# Patient Record
Sex: FEMALE | Race: WHITE | NOT HISPANIC OR LATINO | Employment: UNEMPLOYED | URBAN - NONMETROPOLITAN AREA
[De-identification: names, ages, dates, MRNs, and addresses within clinical notes are randomized per-mention and may not be internally consistent; named-entity substitution may affect disease eponyms.]

---

## 2019-02-27 ENCOUNTER — OUTSIDE FACILITY SERVICE (OUTPATIENT)
Dept: CARDIOLOGY | Facility: CLINIC | Age: 49
End: 2019-02-27

## 2019-02-27 PROCEDURE — 93018 CV STRESS TEST I&R ONLY: CPT | Performed by: INTERNAL MEDICINE

## 2021-06-10 ENCOUNTER — OFFICE VISIT (OUTPATIENT)
Dept: OBSTETRICS AND GYNECOLOGY | Facility: CLINIC | Age: 51
End: 2021-06-10

## 2021-06-10 VITALS
DIASTOLIC BLOOD PRESSURE: 76 MMHG | WEIGHT: 247 LBS | BODY MASS INDEX: 43.77 KG/M2 | SYSTOLIC BLOOD PRESSURE: 120 MMHG | HEIGHT: 63 IN

## 2021-06-10 DIAGNOSIS — R35.0 URINARY FREQUENCY: Primary | ICD-10-CM

## 2021-06-10 LAB
BILIRUB UR QL STRIP: NEGATIVE
CLARITY UR: ABNORMAL
COLOR UR: YELLOW
GLUCOSE UR STRIP-MCNC: NEGATIVE MG/DL
HGB UR QL STRIP.AUTO: NEGATIVE
KETONES UR QL STRIP: NEGATIVE
LEUKOCYTE ESTERASE UR QL STRIP.AUTO: NEGATIVE
NITRITE UR QL STRIP: NEGATIVE
PH UR STRIP.AUTO: 6.5 [PH] (ref 5–8)
PROT UR QL STRIP: NEGATIVE
SP GR UR STRIP: 1.01 (ref 1–1.03)
UROBILINOGEN UR QL STRIP: ABNORMAL

## 2021-06-10 PROCEDURE — 81003 URINALYSIS AUTO W/O SCOPE: CPT | Performed by: OBSTETRICS & GYNECOLOGY

## 2021-06-10 PROCEDURE — 87086 URINE CULTURE/COLONY COUNT: CPT | Performed by: OBSTETRICS & GYNECOLOGY

## 2021-06-10 RX ORDER — TIOTROPIUM BROMIDE INHALATION SPRAY 1.56 UG/1
SPRAY, METERED RESPIRATORY (INHALATION)
COMMUNITY
Start: 2021-06-04 | End: 2023-01-05 | Stop reason: SDUPTHER

## 2021-06-10 RX ORDER — ASPIRIN 81 MG/1
TABLET, CHEWABLE ORAL
COMMUNITY
End: 2022-12-09

## 2021-06-10 RX ORDER — BLOOD SUGAR DIAGNOSTIC
STRIP MISCELLANEOUS
COMMUNITY
Start: 2021-05-22 | End: 2023-01-05 | Stop reason: SDUPTHER

## 2021-06-10 RX ORDER — BUDESONIDE AND FORMOTEROL FUMARATE DIHYDRATE 80; 4.5 UG/1; UG/1
AEROSOL RESPIRATORY (INHALATION) EVERY 6 HOURS
COMMUNITY

## 2021-06-10 RX ORDER — BLOOD-GLUCOSE METER
EACH MISCELLANEOUS
COMMUNITY
Start: 2021-02-25

## 2021-06-10 RX ORDER — FAMOTIDINE 20 MG/1
TABLET, FILM COATED ORAL
COMMUNITY
Start: 2021-01-26 | End: 2023-01-05 | Stop reason: SDUPTHER

## 2021-06-10 RX ORDER — CHLORTHALIDONE 25 MG/1
TABLET ORAL
COMMUNITY
Start: 2021-01-26 | End: 2023-01-05 | Stop reason: SDUPTHER

## 2021-06-10 RX ORDER — LANCETS 33 GAUGE
EACH MISCELLANEOUS
COMMUNITY
Start: 2021-02-25 | End: 2023-01-05 | Stop reason: SDUPTHER

## 2021-06-10 RX ORDER — ATORVASTATIN CALCIUM 40 MG/1
TABLET, FILM COATED ORAL
COMMUNITY
Start: 2021-05-07 | End: 2023-01-05 | Stop reason: SDUPTHER

## 2021-06-11 PROBLEM — N91.2 AMENORRHEA: Status: ACTIVE | Noted: 2021-06-11

## 2021-06-11 LAB — BACTERIA SPEC AEROBE CULT: NORMAL

## 2021-06-11 NOTE — PROGRESS NOTES
CC:  Amenorrhea  Annual  Unable to have encounter today due to physician conflict  Order placed  Reschedule patient

## 2021-08-12 ENCOUNTER — LAB (OUTPATIENT)
Dept: LAB | Facility: HOSPITAL | Age: 51
End: 2021-08-12

## 2021-08-12 ENCOUNTER — OFFICE VISIT (OUTPATIENT)
Dept: OBSTETRICS AND GYNECOLOGY | Facility: CLINIC | Age: 51
End: 2021-08-12

## 2021-08-12 VITALS
HEIGHT: 63 IN | DIASTOLIC BLOOD PRESSURE: 76 MMHG | SYSTOLIC BLOOD PRESSURE: 122 MMHG | BODY MASS INDEX: 44.65 KG/M2 | WEIGHT: 252 LBS

## 2021-08-12 DIAGNOSIS — N39.46 MIXED INCONTINENCE: ICD-10-CM

## 2021-08-12 DIAGNOSIS — E66.01 MORBID OBESITY WITH BMI OF 40.0-44.9, ADULT (HCC): ICD-10-CM

## 2021-08-12 DIAGNOSIS — N91.2 AMENORRHEA: ICD-10-CM

## 2021-08-12 DIAGNOSIS — F17.210 CIGARETTE SMOKER: ICD-10-CM

## 2021-08-12 DIAGNOSIS — Z01.419 WOMEN'S ANNUAL ROUTINE GYNECOLOGICAL EXAMINATION: Primary | ICD-10-CM

## 2021-08-12 PROCEDURE — 82670 ASSAY OF TOTAL ESTRADIOL: CPT | Performed by: OBSTETRICS & GYNECOLOGY

## 2021-08-12 PROCEDURE — 36415 COLL VENOUS BLD VENIPUNCTURE: CPT | Performed by: OBSTETRICS & GYNECOLOGY

## 2021-08-12 PROCEDURE — 84439 ASSAY OF FREE THYROXINE: CPT | Performed by: OBSTETRICS & GYNECOLOGY

## 2021-08-12 PROCEDURE — 99213 OFFICE O/P EST LOW 20 MIN: CPT | Performed by: OBSTETRICS & GYNECOLOGY

## 2021-08-12 PROCEDURE — 99386 PREV VISIT NEW AGE 40-64: CPT | Performed by: OBSTETRICS & GYNECOLOGY

## 2021-08-12 PROCEDURE — 84443 ASSAY THYROID STIM HORMONE: CPT | Performed by: OBSTETRICS & GYNECOLOGY

## 2021-08-12 PROCEDURE — 83001 ASSAY OF GONADOTROPIN (FSH): CPT | Performed by: OBSTETRICS & GYNECOLOGY

## 2021-08-12 PROCEDURE — 84144 ASSAY OF PROGESTERONE: CPT | Performed by: OBSTETRICS & GYNECOLOGY

## 2021-08-12 PROCEDURE — 82306 VITAMIN D 25 HYDROXY: CPT | Performed by: OBSTETRICS & GYNECOLOGY

## 2021-08-12 NOTE — PROGRESS NOTES
Subjective     Chief Complaint   Patient presents with   • Gynecologic Exam     new patient r/s from 6/10/2021 pap smear patient has a  growth butt cheek towards the back        Anastasiia Rubio is a 51 y.o. year old  presenting to be seen for her annual exam.      Her LMP was No LMP recorded..  Her cycles are regular, predictable and consistent every 28 - 32 days  however, she has had no menstrual bleeding since 2021.  Usually her flow is typically normal with no more than 0 days of heavy flow each menses.   Additionally she describes no other symptoms associated with her menses.    She is not sexually active.  In the past 12 months there have not been new sexual partners.    She would not like to be screened for STD's at today's exam.     Current contraceptive methods being used: tubal ligation.  In the coming year, she is not considering changing her current contraceptive method.    She exercises regularly: no.  She wears her seat belt: yes.  She has concerns about domestic violence: no.  Health Maintenance, Female  Adopting a healthy lifestyle and getting preventive care are important in promoting health and wellness. Ask your health care provider about:  · The right schedule for you to have regular tests and exams.  · Things you can do on your own to prevent diseases and keep yourself healthy.  What should I know about diet, weight, and exercise?  Eat a healthy diet       · Eat a diet that includes plenty of vegetables, fruits, low-fat dairy products, and lean protein.  · Do not eat a lot of foods that are high in solid fats, added sugars, or sodium.  Maintain a healthy weight  Body mass index (BMI) is used to identify weight problems. It estimates body fat based on height and weight. Your health care provider can help determine your BMI and help you achieve or maintain a healthy weight.  Get regular exercise  Get regular exercise. This is one of the most important things you can do for your health. Most  adults should:  · Exercise for at least 150 minutes each week. The exercise should increase your heart rate and make you sweat (moderate-intensity exercise).  · Do strengthening exercises at least twice a week. This is in addition to the moderate-intensity exercise.  · Spend less time sitting. Even light physical activity can be beneficial.  Watch cholesterol and blood lipids  Have your blood tested for lipids and cholesterol at 20 years of age, then have this test every 5 years.  Have your cholesterol levels checked more often if:  · Your lipid or cholesterol levels are high.  · You are older than 40 years of age.  · You are at high risk for heart disease.  What should I know about cancer screening?  Depending on your health history and family history, you may need to have cancer screening at various ages. This may include screening for:  · Breast cancer.  · Cervical cancer.  · Colorectal cancer.  · Skin cancer.  · Lung cancer.  What should I know about heart disease, diabetes, and high blood pressure?  Blood pressure and heart disease  · High blood pressure causes heart disease and increases the risk of stroke. This is more likely to develop in people who have high blood pressure readings, are of  descent, or are overweight.  · Have your blood pressure checked:  ? Every 3-5 years if you are 18-39 years of age.  ? Every year if you are 40 years old or older.  Diabetes  Have regular diabetes screenings. This checks your fasting blood sugar level. Have the screening done:  · Once every three years after age 40 if you are at a normal weight and have a low risk for diabetes.  · More often and at a younger age if you are overweight or have a high risk for diabetes.  What should I know about preventing infection?  Hepatitis B  If you have a higher risk for hepatitis B, you should be screened for this virus. Talk with your health care provider to find out if you are at risk for hepatitis B infection.  Hepatitis  C  Testing is recommended for:  · Everyone born from 1945 through 1965.  · Anyone with known risk factors for hepatitis C.  Sexually transmitted infections (STIs)  · Get screened for STIs, including gonorrhea and chlamydia, if:  ? You are sexually active and are younger than 24 years of age.  ? You are older than 24 years of age and your health care provider tells you that you are at risk for this type of infection.  ? Your sexual activity has changed since you were last screened, and you are at increased risk for chlamydia or gonorrhea. Ask your health care provider if you are at risk.  · Ask your health care provider about whether you are at high risk for HIV. Your health care provider may recommend a prescription medicine to help prevent HIV infection. If you choose to take medicine to prevent HIV, you should first get tested for HIV. You should then be tested every 3 months for as long as you are taking the medicine.  Pregnancy  · If you are about to stop having your period (premenopausal) and you may become pregnant, seek counseling before you get pregnant.  · Take 400 to 800 micrograms (mcg) of folic acid every day if you become pregnant.  · Ask for birth control (contraception) if you want to prevent pregnancy.  Osteoporosis and menopause  Osteoporosis is a disease in which the bones lose minerals and strength with aging. This can result in bone fractures. If you are 65 years old or older, or if you are at risk for osteoporosis and fractures, ask your health care provider if you should:  · Be screened for bone loss.  · Take a calcium or vitamin D supplement to lower your risk of fractures.  · Be given hormone replacement therapy (HRT) to treat symptoms of menopause.  Follow these instructions at home:  Lifestyle  · Do not use any products that contain nicotine or tobacco, such as cigarettes, e-cigarettes, and chewing tobacco. If you need help quitting, ask your health care provider.  · Do not use street  drugs.  · Do not share needles.  · Ask your health care provider for help if you need support or information about quitting drugs.  Alcohol use  · Do not drink alcohol if:  ? Your health care provider tells you not to drink.  ? You are pregnant, may be pregnant, or are planning to become pregnant.  · If you drink alcohol:  ? Limit how much you use to 0-1 drink a day.  ? Limit intake if you are breastfeeding.  · Be aware of how much alcohol is in your drink. In the [place] one drink equals one 12 oz bottle of beer (355 mL), one 5 oz glass of wine (148 mL), or one 1½ oz glass of hard liquor (44 mL).  General instructions  · Schedule regular health, dental, and eye exams.  · Stay current with your vaccines.  · Tell your health care provider if:  ? You often feel depressed.  ? You have ever been abused or do not feel safe at home.  Summary  · Adopting a healthy lifestyle and getting preventive care are important in promoting health and wellness.  · Follow your health care provider's instructions about healthy diet, exercising, and getting tested or screened for diseases.  · Follow your health care provider's instructions on monitoring your cholesterol and blood pressure    GYN screening history:  · Last pap: she reports her last PAP was normal  · Last mammogram: she reports her last mammogram was normal  · Last fasting lipid profile: she reports her last lipid panel was normal  · Last 25-hydroxy vitamin D level: her last Vitamin D level is not available to confirm her report of the results   · Last colonoscopy: her last colonoscopy is not available to confirm her report of the results   · Last DEXA: she has never had a DEXA.    Additional Complaints:  Urinary Incontinence  Over one year in duration  Daily  Wear adult diapers  No UTI symptoms  Loss of urine occurs many times during the day  Typically associated with a very breif sense of urgency followed by loss of control  Nocturia x2 but will lose urine upon sitting up  "in bed and wears protection at night as well  She has not had a medication trial  She has failed behavior modification and Kegel  She has numerous risk factors for BUSHRA but that is not the primary symptom  She appears neurologically intact    The following portions of the patient's history were reviewed and updated as appropriate:vital signs, allergies, current medications, past medical history, past social history, past surgical history and problem list.    Review of Systems  Pertinent items are noted in HPI.     Physical Exam    Objective     /76   Ht 160 cm (63\")   Wt 114 kg (252 lb)   Breastfeeding No   BMI 44.64 kg/m²       General:  well developed; well nourished  no acute distress  obese - Body mass index is 44.64 kg/m².   Constitutional: obese   Skin:  No suspicious lesions seen   Thyroid: normal to inspection and palpation   Lungs:  breathing is unlabored  clear to auscultation bilaterally   Heart:  regular rate and rhythm, S1, S2 normal, no murmur, click, rub or gallop   Breasts:  Examined in supine position  Symmetric without masses or skin dimpling  Nipples normal without inversion, lesions or discharge  There are no palpable axillary nodes   Abdomen: Protuberant and no palp mass. non-tender   Pelvis: Exam limited by  body habitus  Clinical staff was present for exam  External genitalia:  normal appearance of the external genitalia including Bartholin's and [place]'s glands.  :  urethra hypermobile;  Vaginal:  normal pink mucosa without prolapse or lesions.  Cervix:  normal appearance pap performed  Uterus:  normal size, shape and consistency good support  Adnexa:  normal bimanual exam of the adnexa.  Cystocele GRADE 1  There is loss of urine with valsalva   Musculoskeletal: negative   Neuro: normal without focal findings, mental status, speech normal, alert and oriented x3 and LORENA   Psych: oriented to time, place and person, mood and affect are within normal limits, pt is a good historian; no " memory problems were noted       Lab Review   UA    Imaging  No data reviewed    Assessment/Plan     ASSESSMENT  1. Women's annual routine gynecological examination    2. Cigarette smoker    3. Amenorrhea   There are no symptoms of vasomotor symptoms or other menopausal problems. This may be a prolonged amenorrhea based on anovulation or perhaps ovarian failure, or other endocrinopathy. Hormonal evaluation appropriate.   4. Morbid obesity with BMI of 40.0-44.9, adult (CMS/[facility])   Morbid obesity with central distribution renders the pelvic exam almost useless. TVS is necessary to confirm the benignity of her pelvic exam.   5. Mixed incontinence   This is a moderate to severe impairment. She is interested in definitive treatment. She has failed Kegel and Behavioral Modification. Will almost certainly benefit from surgical repair after evaluation even with medical trial for overactive bladder.  Thre is urethral hypermobility annd loss of urine with valsalva       PLAN  Orders Placed This Encounter   Procedures   • Follicle Stimulating Hormone     Order Specific Question:   Release to patient     Answer:   Immediate   • Estradiol     Order Specific Question:   Release to patient     Answer:   Immediate   • Progesterone     Order Specific Question:   Release to patient     Answer:   Immediate   • Vitamin D 25 Hydroxy     Order Specific Question:   Release to patient     Answer:   Immediate   • TSH     Order Specific Question:   Release to patient     Answer:   Immediate   • T4, Free     Order Specific Question:   Release to patient     Answer:   Immediate   • Ambulatory Referral to Gynecologic Urology     Referral Priority:   Routine     Referral Type:   Consultation     Referral Reason:   Specialty Services Required     Requested Specialty:   Urogynecology     Number of Visits Requested:   1     No orders of the defined types were placed in this encounter.        Follow up: 1 year(s)  I spent an additional thirty   minutes above  And beyond the annual exam caring for Anastasiia on this date of service regarding mixed urinary incontinence and amenorrhea. This time includes time spent by me in the following activities: reviewing tests, obtaining and/or reviewing a separately obtained history, performing a medically appropriate examination and/or evaluation, counseling and educating the patient/family/caregiver, ordering medications, tests, or procedures, referring and communicating with other health care professionals, documenting information in the medical record and care coordination.          This note was electronically signed.    Beni Rincon MD  August 12, 2021

## 2021-08-12 NOTE — PATIENT INSTRUCTIONS

## 2021-08-13 ENCOUNTER — TELEPHONE (OUTPATIENT)
Dept: OBSTETRICS AND GYNECOLOGY | Facility: CLINIC | Age: 51
End: 2021-08-13

## 2021-08-13 LAB
25(OH)D3 SERPL-MCNC: 21.3 NG/ML (ref 30–100)
ESTRADIOL SERPL HS-MCNC: 55.7 PG/ML
FSH SERPL-ACNC: 32.5 MIU/ML
PROGEST SERPL-MCNC: 0.12 NG/ML
T4 FREE SERPL-MCNC: 1.1 NG/DL (ref 0.93–1.7)
TSH SERPL DL<=0.05 MIU/L-ACNC: 1.23 UIU/ML (ref 0.27–4.2)

## 2021-08-13 NOTE — TELEPHONE ENCOUNTER
Patient advised of results of lab work low vit D.  Vit D 800 IU recommended daily.  Patient concerned of no menses since February and normal labs making sure she is not menopausal

## 2021-08-14 NOTE — TELEPHONE ENCOUNTER
OK. Her hormone check is indeed normal. But, it is also suggestive of menopause. Because there can be quite a bit of fluctuation in hormone levels in the perimenopausal period, if she would like, we can repeat these labs in a month or so to confirm menopause. But, from these labs  alone, I would think she is likely in menopause and wont have another period. But ovarian function can wax and wane for up to years and she might have more periods. The bottom line is that the hormone levels we checked are a good explanation for why she has not had a period. But, not an absolute prediction of future menses.

## 2021-08-16 NOTE — TELEPHONE ENCOUNTER
Patient advised of results showing menopause there cam be quite a fit of fluctuation in hormone levels in perimenopause.  Repeat labs to confirm if needed.  However not absolute of future menses

## 2021-08-24 DIAGNOSIS — Z01.419 WOMEN'S ANNUAL ROUTINE GYNECOLOGICAL EXAMINATION: ICD-10-CM

## 2021-09-03 ENCOUNTER — TELEPHONE (OUTPATIENT)
Dept: OBSTETRICS AND GYNECOLOGY | Facility: CLINIC | Age: 51
End: 2021-09-03

## 2021-09-07 DIAGNOSIS — E66.01 MORBID OBESITY WITH BMI OF 40.0-44.9, ADULT (HCC): Primary | ICD-10-CM

## 2021-09-13 ENCOUNTER — LAB (OUTPATIENT)
Dept: LAB | Facility: HOSPITAL | Age: 51
End: 2021-09-13

## 2021-09-13 ENCOUNTER — OFFICE VISIT (OUTPATIENT)
Dept: OBSTETRICS AND GYNECOLOGY | Facility: CLINIC | Age: 51
End: 2021-09-13

## 2021-09-13 VITALS
SYSTOLIC BLOOD PRESSURE: 130 MMHG | DIASTOLIC BLOOD PRESSURE: 80 MMHG | WEIGHT: 252 LBS | BODY MASS INDEX: 44.65 KG/M2 | HEIGHT: 63 IN

## 2021-09-13 DIAGNOSIS — E66.01 MORBID OBESITY WITH BMI OF 40.0-44.9, ADULT (HCC): Primary | ICD-10-CM

## 2021-09-13 DIAGNOSIS — F17.210 CIGARETTE SMOKER: ICD-10-CM

## 2021-09-13 DIAGNOSIS — N39.46 MIXED INCONTINENCE: ICD-10-CM

## 2021-09-13 DIAGNOSIS — N91.2 AMENORRHEA: ICD-10-CM

## 2021-09-13 LAB
ESTRADIOL SERPL HS-MCNC: 38.7 PG/ML
FSH SERPL-ACNC: 44.9 MIU/ML

## 2021-09-13 PROCEDURE — 36415 COLL VENOUS BLD VENIPUNCTURE: CPT | Performed by: OBSTETRICS & GYNECOLOGY

## 2021-09-13 PROCEDURE — 82670 ASSAY OF TOTAL ESTRADIOL: CPT | Performed by: OBSTETRICS & GYNECOLOGY

## 2021-09-13 PROCEDURE — 99213 OFFICE O/P EST LOW 20 MIN: CPT | Performed by: OBSTETRICS & GYNECOLOGY

## 2021-09-13 PROCEDURE — 83001 ASSAY OF GONADOTROPIN (FSH): CPT | Performed by: OBSTETRICS & GYNECOLOGY

## 2021-09-13 NOTE — PROGRESS NOTES
CC:  Follow up amenorrhea, Obesity, bloating, menopausal symptoms and mixed incontinence  HPI  TVS  normal and essentially excludes an anatomic  GYN cause of bloating  Labs reviewed. Consistent with but not completely diagnostic of ovarian failure, ie menopause  Still with incapacitating UI  ROS  Neg otherwise  VS  Reviewed  PE  Not performed  Impression  Mixed incontinence  Obesity  Menopausal symptom  Plan  Repeat hormone panel  Refer to [facility] [place] [place] [facility] for evaluation and opinion on management of incontinence.  Follow up here prn

## 2021-09-14 ENCOUNTER — TELEPHONE (OUTPATIENT)
Dept: OBSTETRICS AND GYNECOLOGY | Facility: CLINIC | Age: 51
End: 2021-09-14

## 2021-09-14 NOTE — TELEPHONE ENCOUNTER
----- Message from Beni Rincon MD sent at 9/14/2021  9:37 AM EDT -----  Repeat Labs would seem to confirm Menopause

## 2021-10-04 ENCOUNTER — OFFICE VISIT (OUTPATIENT)
Dept: UROLOGY | Facility: CLINIC | Age: 51
End: 2021-10-04

## 2021-10-04 VITALS
BODY MASS INDEX: 44.47 KG/M2 | WEIGHT: 251 LBS | HEART RATE: 92 BPM | TEMPERATURE: 97.7 F | SYSTOLIC BLOOD PRESSURE: 134 MMHG | DIASTOLIC BLOOD PRESSURE: 78 MMHG | HEIGHT: 63 IN | OXYGEN SATURATION: 90 %

## 2021-10-04 DIAGNOSIS — N39.46 MIXED INCONTINENCE: ICD-10-CM

## 2021-10-04 LAB
BILIRUB BLD-MCNC: NEGATIVE MG/DL
CLARITY, POC: CLEAR
COLOR UR: YELLOW
GLUCOSE UR STRIP-MCNC: NEGATIVE MG/DL
KETONES UR QL: NEGATIVE
LEUKOCYTE EST, POC: NEGATIVE
NITRITE UR-MCNC: NEGATIVE MG/ML
PH UR: 6.5 [PH] (ref 5–8)
PROT UR STRIP-MCNC: NEGATIVE MG/DL
RBC # UR STRIP: NEGATIVE /UL
SP GR UR: 1.01 (ref 1–1.03)
UROBILINOGEN UR QL: NORMAL

## 2021-10-04 PROCEDURE — 99204 OFFICE O/P NEW MOD 45 MIN: CPT | Performed by: STUDENT IN AN ORGANIZED HEALTH CARE EDUCATION/TRAINING PROGRAM

## 2021-10-04 PROCEDURE — 51798 US URINE CAPACITY MEASURE: CPT | Performed by: STUDENT IN AN ORGANIZED HEALTH CARE EDUCATION/TRAINING PROGRAM

## 2021-10-04 RX ORDER — OXYBUTYNIN CHLORIDE 10 MG/1
10 TABLET, EXTENDED RELEASE ORAL DAILY
Qty: 90 TABLET | Refills: 3 | Status: SHIPPED | OUTPATIENT
Start: 2021-10-04 | End: 2022-09-29

## 2021-10-04 NOTE — PROGRESS NOTES
[facility] Female Office Visit      Patient Name: Anastasiia Rubio  : 1970   MRN: [id number]     Chief Complaint:  Lower Urinary Tract Symptoms.   Chief Complaint   Patient presents with   • New Patient   • Urinary Incontinence       Referring Provider: Beni Rincon MD    History of Present Illness: Mr. Rubio is a 51 y.o. female with history of hypertension, hyperlipidemia, COPD, type 2 diabetes on Metformin, who presents for the evaluation of mixed urinary incontinence.    Patient reports she has had 4 vaginal deliveries all uncomplicated.  She is not currently sexually active.  She has been experiencing significant urinary incontinence for the last 6 months.  She has to wear diapers during the day for large volume voids which are uncontrolled.  She reports urinary urgency and difficulty making it to the restroom in time.  She also reports stress urinary incontinence with cough, laugh, sneeze.    Her urgency incontinence is marked by nocturnal enuresis, she also reports large volume voids upon standing if getting up from bed, and random large volume voids when she is out and about walking.    Patient has been counseled on Kegel exercises and behavioral modifications and was referred to urology after reportedly failing to improve her incontinence.  She has not previously seen pelvic floor physical therapy.    When asked, the patient reports that both urgency related incontinence as well as stress related incontinence are significant impact on her quality of life.  She is interested in medical or surgical therapy for this.    Denies previous urologic or gynecologic surgery.  She denies significant UTI history.    Type II diabetic well-controlled with Metformin, recent Hgb A1c 6.0 2021       Subjective      Review of System: Review of Systems   Constitutional: Negative for chills, fatigue, fever and unexpected weight change.   HENT: Negative for sore throat.    Eyes: Negative for visual disturbance.    Respiratory: Negative for cough, chest tightness and shortness of breath.    Cardiovascular: Negative for chest pain and leg swelling.   Gastrointestinal: Positive for constipation. Negative for blood in stool, diarrhea, nausea, rectal pain and vomiting.   Genitourinary: Positive for frequency and urgency. Negative for decreased urine volume, difficulty urinating, dysuria, enuresis, flank pain, genital sores and hematuria.   Musculoskeletal: Negative for back pain and joint swelling.   Skin: Negative for rash and wound.   Neurological: Positive for headaches. Negative for seizures, speech difficulty and weakness.   Psychiatric/Behavioral: Negative for confusion, sleep disturbance and suicidal ideas. The patient is not nervous/anxious.       I have reviewed the ROS documented by my clinical staff, I have updated appropriately and I agree. Jama Lomeli MD    Past Medical History:  Past Medical History:   Diagnosis Date   • COPD (chronic obstructive pulmonary disease) (HCC)    • COVID-19    • CPAP (continuous positive airway pressure) dependence    • Diabetes mellitus (HCC)    • Hyperlipidemia    • Hypertension    • Urinary tract infection        Past Surgical History:  Past Surgical History:   Procedure Laterality Date   • BREAST AUGMENTATION     • LAPAROSCOPIC CHOLECYSTECTOMY     • TUBAL ABDOMINAL LIGATION         Medications:    Current Outpatient Medications:   •  aspirin 81 MG chewable tablet, Chew., Disp: , Rfl:   •  atorvastatin (LIPITOR) 40 MG tablet, , Disp: , Rfl:   •  Blood Glucose Monitoring Suppl (OneTouch Verio Flex System) w/Device kit, Test blood glucose level 1-2 times per day DX:E11.9, Disp: , Rfl:   •  budesonide-formoterol (SYMBICORT) 80-4.5 MCG/ACT inhaler, Take  by mouth Every 6 (Six) Hours., Disp: , Rfl:   •  chlorthalidone (HYGROTON) 25 MG tablet, , Disp: , Rfl:   •  famotidine (PEPCID) 20 MG tablet, TAKE 1 TABLET TWICE DAILY., Disp: , Rfl:   •  Lancets (OneTouch Delica Plus Kteptx27G)  "misc, USE TO TEST BLOOD GLUCOSE 1-2  TIMES A DAY  DX E11.9, Disp: , Rfl:   •  metFORMIN (GLUCOPHAGE) 500 MG tablet, , Disp: , Rfl:   •  metoprolol tartrate (LOPRESSOR) 25 MG tablet, , Disp: , Rfl:   •  OneTouch Verio test strip, , Disp: , Rfl:   •  Spiriva Respimat 1.25 MCG/ACT aerosol solution inhaler, , Disp: , Rfl:   •  oxybutynin XL (Ditropan XL) 10 MG 24 hr tablet, Take 1 tablet by mouth Daily for 360 days., Disp: 90 tablet, Rfl: 3    Allergies:  No Known Allergies    Social History:  Social History     Socioeconomic History   • Marital status:      Spouse name: Not on file   • Number of children: Not on file   • Years of education: Not on file   • Highest education level: Not on file   Tobacco Use   • Smoking status: Light Tobacco Smoker   Vaping Use   • Vaping Use: Never used   Substance and Sexual Activity   • Alcohol use: Never   • Drug use: Never   • Sexual activity: Yes     Partners: Male       Family History:  Family History   Problem Relation Age of Onset   • Heart disease Father    • Diabetes Father    • Colon cancer Father    • Diabetes Mother    • Lung cancer Mother    • Heart disease Mother            Post void residual bladder scan:    0 mL    Objective     Physical Exam:   Vital Signs:   Vitals:    10/04/21 1129   BP: 134/78   Pulse: 92   Temp: 97.7 °F (36.5 °C)   TempSrc: Temporal   SpO2: 90%   Weight: 114 kg (251 lb)   Height: 160 cm (63\")   PainSc: 0-No pain     Body mass index is 44.46 kg/m².     Physical Exam  Vitals and nursing note reviewed. Exam conducted with a chaperone present.   Constitutional:       Appearance: Normal appearance.   HENT:      Head: Normocephalic and atraumatic.      Mouth/Throat:      Mouth: Mucous membranes are moist.      Pharynx: Oropharynx is clear.   Eyes:      Extraocular Movements: Extraocular movements intact.      Conjunctiva/sclera: Conjunctivae normal.   Cardiovascular:      Rate and Rhythm: Normal rate and regular rhythm.   Pulmonary:      Effort: " Pulmonary effort is normal. No respiratory distress.   Abdominal:      Palpations: Abdomen is soft.      Tenderness: There is no abdominal tenderness. There is no right CVA tenderness or left CVA tenderness.   Genitourinary:     General: Normal vulva.      Vagina: No vaginal discharge.      Comments: No vaginal atrophy noted  No anterior or posterior prolapse noted  No apical prolapse noted  Cervix visible without lesions  No vaginal discharge noted   No urethral hypermobility   No discernible stress incontinence on Valsalva or cough (patient had empty bladder however)  Musculoskeletal:         General: Normal range of motion.      Cervical back: Normal range of motion.   Skin:     General: Skin is warm and dry.   Neurological:      General: No focal deficit present.      Mental Status: She is alert and oriented to person, place, and time.   Psychiatric:         Mood and Affect: Mood normal.         Behavior: Behavior normal.         Labs:   Brief Urine Lab Results  (Last result in the past 365 days)      Color   Clarity   Blood   Leuk Est   Nitrite   Protein   CREAT   Urine HCG        10/04/21 1418 Yellow Clear Negative Negative Negative Negative               Urine Culture    Urine Culture 6/10/21   Urine Culture 25,000 CFU/mL Normal Urogenital Berkley              No results found for: GLUCOSE, CALCIUM, NA, K, CO2, CL, BUN, CREATININE, EGFRIFAFRI, EGFRIFNONA, BCR, ANIONGAP    No results found for: WBC, HGB, HCT, MCV, PLT    Images:   No Images in the past 120 days found..    Measures:   Tobacco:   Anastasiia Rubio  reports that she has been smoking. She does not have any smokeless tobacco history on file.. I have educated her on the risk of diseases from using tobacco products such as cancer, COPD and heart disease.     I advised her to quit and she is not willing to quit.    I spent 3  minutes counseling the patient.           Urine Incontinence: (NOUI)  Patient reports that she is currently experiencing mixed urinary  incontinence.        Assessment / Plan      Assessment:  Mrs. Rubio is a 51 y.o. female who presented today with lower urinary tract symptoms, urgency and stress related incontinence which are significant impact on the patient's quality of life.  She requires adult diapers.  She also has some nocturnal enuresis.  We identified some potential fluid restriction prior to bedtime which may improve her nocturnal enuresis.  From an emergency standpoint, she has never been treated with anticholinergics and this would be first-line for her.  From a stress urinary incontinence standpoint, the patient describes being told how to perform Kegel's but has not been doing this and she is interested in pelvic floor physical therapy prior to considering surgical interventions which we discussed which includes urethral bulking injections versus mid urethral sling with mesh.    We will start with physical therapy and oxybutynin 10 mg XL.  Bring her back in 3 months to assess improvement in symptoms.  Will consider surgical interventions at that time.    Unable to identify stress incontinence on exam however the patient had voided prior to my clinic visit.    Diagnoses and all orders for this visit:    1. Mixed incontinence  -     oxybutynin XL (Ditropan XL) 10 MG 24 hr tablet; Take 1 tablet by mouth Daily for 360 days.  Dispense: 90 tablet; Refill: 3  -     Ambulatory Referral to Physical Therapy Evaluate and treat, Pelvic Floor  -     POC Urinalysis Dipstick, Automated           Follow Up:   Return in about 3 months (around 1/4/2022).    I spent approximately 45 minutes providing clinical care for this patient; including review of patient's chart and provider documentation, face to face time spent with patient in examination room (obtaining history, performing physical exam, discussing diagnosis and management options), placing orders, and completing patient documentation.     Jama Lomeli MD  [facility]

## 2022-02-07 ENCOUNTER — HOSPITAL ENCOUNTER (EMERGENCY)
Facility: HOSPITAL | Age: 52
Discharge: LEFT BEFORE DISCHARGE | End: 2022-02-07

## 2022-02-07 VITALS
HEART RATE: 105 BPM | WEIGHT: 248 LBS | BODY MASS INDEX: 43.94 KG/M2 | TEMPERATURE: 98.2 F | OXYGEN SATURATION: 93 % | HEIGHT: 63 IN | RESPIRATION RATE: 18 BRPM | SYSTOLIC BLOOD PRESSURE: 158 MMHG | DIASTOLIC BLOOD PRESSURE: 91 MMHG

## 2022-02-07 PROCEDURE — 99211 OFF/OP EST MAY X REQ PHY/QHP: CPT

## 2022-12-09 ENCOUNTER — OFFICE VISIT (OUTPATIENT)
Dept: FAMILY MEDICINE CLINIC | Facility: CLINIC | Age: 52
End: 2022-12-09

## 2022-12-09 VITALS
OXYGEN SATURATION: 99 % | BODY MASS INDEX: 45.18 KG/M2 | WEIGHT: 255 LBS | SYSTOLIC BLOOD PRESSURE: 143 MMHG | HEIGHT: 63 IN | HEART RATE: 96 BPM | DIASTOLIC BLOOD PRESSURE: 84 MMHG | RESPIRATION RATE: 18 BRPM | TEMPERATURE: 99.1 F

## 2022-12-09 DIAGNOSIS — E66.01 MORBID OBESITY: ICD-10-CM

## 2022-12-09 DIAGNOSIS — U09.9 POST-COVID SYNDROME: ICD-10-CM

## 2022-12-09 DIAGNOSIS — I10 PRIMARY HYPERTENSION: ICD-10-CM

## 2022-12-09 DIAGNOSIS — Z00.00 ENCOUNTER FOR MEDICAL EXAMINATION TO ESTABLISH CARE: ICD-10-CM

## 2022-12-09 DIAGNOSIS — R06.00 DYSPNEA, UNSPECIFIED TYPE: ICD-10-CM

## 2022-12-09 DIAGNOSIS — G47.33 OBSTRUCTIVE SLEEP APNEA SYNDROME: ICD-10-CM

## 2022-12-09 DIAGNOSIS — E55.9 VITAMIN D DEFICIENCY: ICD-10-CM

## 2022-12-09 DIAGNOSIS — Z99.81 ON HOME OXYGEN THERAPY: ICD-10-CM

## 2022-12-09 DIAGNOSIS — E11.43 TYPE 2 DIABETES MELLITUS WITH DIABETIC AUTONOMIC NEUROPATHY, WITHOUT LONG-TERM CURRENT USE OF INSULIN: Primary | ICD-10-CM

## 2022-12-09 DIAGNOSIS — H65.00 ACUTE SEROUS OTITIS MEDIA, RECURRENCE NOT SPECIFIED, UNSPECIFIED LATERALITY: ICD-10-CM

## 2022-12-09 DIAGNOSIS — Z23 IMMUNIZATION DUE: ICD-10-CM

## 2022-12-09 DIAGNOSIS — R53.83 OTHER FATIGUE: ICD-10-CM

## 2022-12-09 DIAGNOSIS — E78.2 MIXED HYPERLIPIDEMIA: ICD-10-CM

## 2022-12-09 PROBLEM — L82.1 SEBORRHEIC KERATOSIS: Status: ACTIVE | Noted: 2019-03-25

## 2022-12-09 PROBLEM — E11.9 DIABETES MELLITUS, TYPE 2 (HCC): Status: ACTIVE | Noted: 2020-11-10

## 2022-12-09 PROBLEM — N91.2 AMENORRHEA: Status: ACTIVE | Noted: 2019-10-02

## 2022-12-09 PROBLEM — Z86.16 HISTORY OF 2019 NOVEL CORONAVIRUS DISEASE (COVID-19): Status: ACTIVE | Noted: 2020-07-29

## 2022-12-09 PROBLEM — K21.9 GERD (GASTROESOPHAGEAL REFLUX DISEASE): Status: ACTIVE | Noted: 2020-08-25

## 2022-12-09 PROBLEM — S62.629A FRACTURE OF MIDDLE PHALANX OF FINGER: Status: ACTIVE | Noted: 2019-12-13

## 2022-12-09 PROBLEM — R73.03 PREDIABETES: Status: ACTIVE | Noted: 2018-11-02

## 2022-12-09 PROBLEM — H16.139 ACTINIC KERATITIS: Status: ACTIVE | Noted: 2019-03-25

## 2022-12-09 PROBLEM — R04.2 HEMOPTYSIS: Status: ACTIVE | Noted: 2020-05-14

## 2022-12-09 PROBLEM — H69.90 ET (EUSTACHIAN TUBE DISORDER): Status: ACTIVE | Noted: 2020-06-16

## 2022-12-09 PROBLEM — H91.90 HEARING LOSS: Status: ACTIVE | Noted: 2019-09-26

## 2022-12-09 PROBLEM — D72.829 LEUKOCYTOSIS: Status: ACTIVE | Noted: 2019-03-07

## 2022-12-09 PROBLEM — R74.01 TRANSAMINITIS: Status: ACTIVE | Noted: 2021-01-28

## 2022-12-09 PROBLEM — R43.0 LOSS OF SMELL: Status: ACTIVE | Noted: 2020-09-04

## 2022-12-09 PROBLEM — F17.200 TOBACCO DEPENDENCE: Status: ACTIVE | Noted: 2018-10-30

## 2022-12-09 PROBLEM — F32.A DEPRESSION: Status: ACTIVE | Noted: 2019-12-24

## 2022-12-09 PROBLEM — R63.5 WEIGHT GAIN: Status: ACTIVE | Noted: 2018-10-30

## 2022-12-09 PROBLEM — D58.2 ELEVATED HEMOGLOBIN: Status: ACTIVE | Noted: 2019-03-09

## 2022-12-09 PROBLEM — R14.1 GAS PAIN: Status: ACTIVE | Noted: 2020-08-03

## 2022-12-09 PROCEDURE — 90471 IMMUNIZATION ADMIN: CPT | Performed by: NURSE PRACTITIONER

## 2022-12-09 PROCEDURE — 90472 IMMUNIZATION ADMIN EACH ADD: CPT | Performed by: NURSE PRACTITIONER

## 2022-12-09 PROCEDURE — 90686 IIV4 VACC NO PRSV 0.5 ML IM: CPT | Performed by: NURSE PRACTITIONER

## 2022-12-09 PROCEDURE — 90677 PCV20 VACCINE IM: CPT | Performed by: NURSE PRACTITIONER

## 2022-12-09 PROCEDURE — 99204 OFFICE O/P NEW MOD 45 MIN: CPT | Performed by: NURSE PRACTITIONER

## 2022-12-09 RX ORDER — PANTOPRAZOLE SODIUM 40 MG/1
TABLET, DELAYED RELEASE ORAL
COMMUNITY
End: 2022-12-09

## 2022-12-09 RX ORDER — AMOXICILLIN 500 MG/1
500 CAPSULE ORAL 2 TIMES DAILY
Qty: 20 CAPSULE | Refills: 0 | Status: SHIPPED | OUTPATIENT
Start: 2022-12-09 | End: 2022-12-19

## 2022-12-09 NOTE — PROGRESS NOTES
Chief Complaint  Hypertension (Pt here to establish care. )    Subjective        Anastasiia Rubio presents to [facility]  History of Present Illness  Just moved here, Got covid 2 yrs ago and has developed all theses complications since covid, on oxygen all the time, and she ran out of oxygen bottles so has gone without oxygen.  Needs referral to get oxygen, says son is a doctor, she has had a headache for 2 weeks thinks it due to not having oxygen. Says she feels like she has an ear infection, cpap and needs to get set up for that.  She does use nebulizer. She would also like to get a prevnar 20, and flu vaccine.  She will need to be referred to pulmonology. She has diabetes type 2 stable with metformin, HTN stable with metoprolol, COPD and shortness of breath not  stable with symbicort, sprivia and oxygen therapy, because she doesn't have any oxygen bottles.  Says that the ear ache is killing her and she needs something for it. Says right now does not need any meds refilled.   Insomnia  This is a chronic problem. The current episode started more than 1 year ago. The problem occurs intermittently. The problem has been unchanged. Associated symptoms include arthralgias and coughing. Pertinent negatives include no change in bowel habit, chest pain, chills, congestion, fatigue, headaches, joint swelling, myalgias, nausea, neck pain, sore throat, swollen glands, urinary symptoms, vertigo, visual change or weakness. Nothing aggravates the symptoms. She has tried acetaminophen for the symptoms. The treatment provided mild relief.   COPD  She complains of cough, hoarse voice and shortness of breath. There is no difficulty breathing or sputum production. This is a chronic problem. The current episode started more than 1 year ago. The problem occurs 2 to 4 times per day. The problem has been gradually worsening. The cough is non-productive, hoarse and dry. Associated symptoms include heartburn  and nasal congestion. Pertinent negatives include no chest pain, ear congestion, ear pain, headaches, malaise/fatigue, myalgias, orthopnea, sneezing, sore throat or sweats. Her symptoms are aggravated by exposure to fumes, exposure to smoke, change in weather, any activity, climbing stairs, lying down and minimal activity. Her symptoms are alleviated by change in position, cold air, oral steroids, prescription cough suppressant, steroid inhaler and rest. She reports minimal improvement on treatment. Her symptoms are not alleviated by rest, oral steroids and leukotriene antagonist. There are no known risk factors for lung disease. Her past medical history is significant for COPD. There is no history of emphysema or pneumonia.   Hyperlipidemia  The current episode started more than 1 year ago. The problem is uncontrolled. Recent lipid tests were reviewed and are high. Exacerbating diseases include obesity. She has no history of chronic renal disease, diabetes, hypothyroidism or nephrotic syndrome. Factors aggravating her hyperlipidemia include beta blockers. Associated symptoms include shortness of breath. Pertinent negatives include no chest pain or myalgias. Current antihyperlipidemic treatment includes statins. The current treatment provides mild improvement of lipids. There are no compliance problems.  Risk factors for coronary artery disease include post-menopausal, a sedentary lifestyle, hypertension, obesity, dyslipidemia, family history, diabetes mellitus and stress.   Heartburn  She complains of coughing, heartburn and a hoarse voice. She reports no belching, no chest pain, no globus sensation, no nausea, no sore throat, no tooth decay or no water brash. This is a chronic problem. The current episode started more than 1 year ago. The problem occurs occasionally. The problem has been unchanged. The heartburn is located in the substernum. The heartburn is of mild intensity. The heartburn does not wake her from  sleep. The heartburn does not limit her activity. The heartburn doesn't change with position. The symptoms are aggravated by certain foods. Pertinent negatives include no fatigue or orthopnea. Risk factors include obesity, caffeine use and lack of exercise. She has tried an antacid and a PPI for the symptoms. The treatment provided mild relief. Past procedures do not include an EGD or H. pylori antibody titer.   Hypertension  This is a chronic problem. The current episode started more than 1 year ago. The problem has been gradually worsening since onset. The problem is controlled. Associated symptoms include shortness of breath. Pertinent negatives include no chest pain, headaches, malaise/fatigue, neck pain or sweats. There are no associated agents to hypertension. Risk factors for coronary artery disease include diabetes mellitus, dyslipidemia, family history, obesity, post-menopausal state and sedentary lifestyle. Past treatments include angiotensin blockers and beta blockers. Current antihypertension treatment includes angiotensin blockers and beta blockers. The current treatment provides mild improvement. There are no compliance problems.  There is no history of kidney disease, heart failure or retinopathy. There is no history of chronic renal disease.   Diabetes  She presents for her follow-up diabetic visit. She has type 2 diabetes mellitus. No MedicAlert identification noted. Her disease course has been fluctuating. Hypoglycemia symptoms include nervousness/anxiousness and sleepiness. Pertinent negatives for hypoglycemia include no dizziness, headaches or sweats. Pertinent negatives for diabetes include no chest pain, no fatigue, no polydipsia, no polyphagia, no polyuria, no visual change and no weakness. There are no hypoglycemic complications. Symptoms are stable. There are no diabetic complications. Pertinent negatives for diabetic complications include no retinopathy. Risk factors for coronary artery  "disease include diabetes mellitus, dyslipidemia, family history, male sex, hypertension, post-menopausal, sedentary lifestyle and obesity. Current diabetic treatment includes oral agent (monotherapy). She is compliant with treatment none of the time. Her weight is stable. She is following a generally unhealthy diet. When asked about meal planning, she reported none. She has not had a previous visit with a dietitian. She participates in exercise intermittently. There is no change in her home blood glucose trend. Her breakfast blood glucose is taken between 6-7 am. Her breakfast blood glucose range is generally  mg/dl. She does not see a podiatrist.Eye exam is not current.       Objective   Vital Signs:  /84 (BP Location: Left arm, Patient Position: Sitting, Cuff Size: Adult)   Pulse 96   Temp 99.1 °F (37.3 °C) (Infrared)   Resp 18   Ht 160 cm (63\") Comment: per patient  Wt 116 kg (255 lb)   SpO2 99%   BMI 45.17 kg/m²   Estimated body mass index is 45.17 kg/m² as calculated from the following:    Height as of this encounter: 160 cm (63\").    Weight as of this encounter: 116 kg (255 lb).          Physical Exam  Vitals and nursing note reviewed.   Constitutional:       General: She is awake.      Appearance: Normal appearance. She is well-developed and well-groomed.   HENT:      Head: Normocephalic and atraumatic.      Right Ear: Hearing and external ear normal. Tympanic membrane is erythematous and bulging.      Left Ear: Hearing, tympanic membrane, ear canal and external ear normal.      Nose: Congestion present.      Right Sinus: No maxillary sinus tenderness or frontal sinus tenderness.      Left Sinus: No maxillary sinus tenderness or frontal sinus tenderness.      Mouth/Throat:      Lips: Pink.      Pharynx: Oropharynx is clear.   Eyes:      General: Lids are normal.      Conjunctiva/sclera: Conjunctivae normal.   Cardiovascular:      Rate and Rhythm: Normal rate and regular rhythm.      Heart " sounds: Normal heart sounds.   Pulmonary:      Effort: Pulmonary effort is normal.      Breath sounds: Normal air entry. Examination of the right-upper field reveals wheezing. Examination of the left-upper field reveals wheezing. Wheezing present.   Musculoskeletal:      Cervical back: Full passive range of motion without pain.      Right lower leg: No edema.      Left lower leg: No edema.   Lymphadenopathy:      Head:      Right side of head: No submental, submandibular or tonsillar adenopathy.      Left side of head: No submental, submandibular or tonsillar adenopathy.   Skin:     General: Skin is warm and dry.   Neurological:      Mental Status: She is alert and oriented to person, place, and time.      Sensory: Sensation is intact.      Motor: Motor function is intact.      Coordination: Coordination is intact.      Gait: Gait is intact.   Psychiatric:         Attention and Perception: Attention and perception normal.         Mood and Affect: Mood and affect normal.         Speech: Speech normal.         Behavior: Behavior normal. Behavior is cooperative.         Thought Content: Thought content normal.         Cognition and Memory: Cognition and memory normal.         Judgment: Judgment normal.        Result Review :                Assessment and Plan   Diagnoses and all orders for this visit:    2. Type 2 diabetes mellitus with diabetic autonomic neuropathy, without long-term current use of insulin (HCC) (Primary)        -      Continue metformin as prescribed, call for refill        -      Monitor blood sugars and bring log to next visit.   -     CBC (No Diff); Future  -     Comprehensive metabolic panel; Future  -     Hemoglobin A1c; Future    3. Primary hypertension       -     Continue metoprolol as prescribed, call when needs refill       -     Continue hygroton as prescribed     4. Mixed hyperlipidemia  -     Lipid panel; Future  -    continue atorvastatin as prescribed, call when needs refill  -    Make  better dietary choices, baked instead of fried or fast food    5. Obstructive sleep apnea syndrome  -     Oxygen Therapy  -     Continue CPAP as prescribed   -     Ambulatory Referral to Pulmonology  -     CBC (No Diff); Future  -     Comprehensive metabolic panel; Future    6. On home oxygen therapy  7. Dyspnea, unspecified type  -     Pneumococcal Conjugate Vaccine 20-Valent (PCV20)  -     FluLaval/Fluzone >6 mos ([id number])  -     Oxygen Therapy  -     Ambulatory Referral to Pulmonology        8. Post-COVID syndrome  -     Pneumococcal Conjugate Vaccine 20-Valent (PCV20)  -     FluLaval/Fluzone >6 mos ([id number])  -     Oxygen Therapy  -     Ambulatory Referral to Pulmonology  9. Vitamin D deficiency  -     Vitamin D 25 hydroxy; Future  10. Other fatigue  -     TSH; Future  -     T4, free; Future    11. Acute serous otitis media, recurrence not specified, unspecified laterality  -     amoxicillin (AMOXIL) 500 MG capsule; Take 1 capsule by mouth 2 (Two) Times a Day for 10 days.  Dispense: 20 capsule; Refill: 0    12. Immunization due  -     Pneumococcal Conjugate Vaccine 20-Valent (PCV20)  -     FluLaval/Fluzone >6 mos ([id number])        -     “Discussed risks/benefits to vaccination, reviewed components of the vaccine, discussed VIS, discussed informed consent, informed consent obtained. Patient/Parent was allowed to accept or refuse vaccine. Questions answered to satisfactory state of patient/Parent. We reviewed typical age appropriate and seasonally appropriate vaccinations. Reviewed immunization history and updated state vaccination form as needed. Patient was counseled on Influenza  Prevnar 20    13. Morbid obesity (HCC)  Assessment & Plan:  Patient's (Body mass index is 45.17 kg/m².) indicates that they are morbidly obese (BMI > 40 or > 35 with obesity - related health condition) with health conditions that include obstructive sleep apnea, hypertension, diabetes mellitus, dyslipidemias and GERD . Weight is  worsening. BMI is is above average; BMI management plan is completed. We discussed portion control and increasing exercise.     Will address lung ct and other care gaps at next visit           Follow Up   Return in about 1 month (around 1/11/2023) for Recheck.  Patient was given instructions and counseling regarding her condition or for health maintenance advice. Please see specific information pulled into the AVS if appropriate.     Electronically signed by Nani Adkins DNP, APRN, 12/21/22, 12:56 PM CST.

## 2022-12-20 ENCOUNTER — TELEMEDICINE (OUTPATIENT)
Dept: FAMILY MEDICINE CLINIC | Facility: CLINIC | Age: 52
End: 2022-12-20

## 2022-12-20 VITALS — HEIGHT: 63 IN | WEIGHT: 255 LBS | BODY MASS INDEX: 45.18 KG/M2

## 2022-12-20 DIAGNOSIS — Z99.81 ON HOME OXYGEN THERAPY: ICD-10-CM

## 2022-12-20 DIAGNOSIS — G47.33 OBSTRUCTIVE SLEEP APNEA: ICD-10-CM

## 2022-12-20 DIAGNOSIS — G44.1 OTHER VASCULAR HEADACHE: Primary | ICD-10-CM

## 2022-12-20 PROCEDURE — 99213 OFFICE O/P EST LOW 20 MIN: CPT | Performed by: NURSE PRACTITIONER

## 2022-12-20 RX ORDER — IBUPROFEN 600 MG/1
600 TABLET ORAL EVERY 6 HOURS PRN
Qty: 90 TABLET | Refills: 0 | Status: SHIPPED | OUTPATIENT
Start: 2022-12-20

## 2022-12-20 NOTE — PROGRESS NOTES
"Chief Complaint  No chief complaint on file.    Subjective        Anastasiia Rubio presents to [facility]  History of Present Illness  This was an audio and video enabled telemedicine encounter. I am at [facility], and she is in her home.  She wishes to proceed with visit.  She has complaints of headache every day that she wakes up.  Says over the last couple weeks headaches will not go away with tylenol, motrin, or anything will help it go away. She describes it as behind the eyes and at the back of her neck. She denies any associated nausea or vomiting, denies any visual changes.  Denies chest pain, shortness of breath or palpitations.  The last 2 visits she has had an elevated bp.  She is going to monitor her bp daily until after xmas and if it is still elevated I will start medication.  Denies photophonia or photophobia. She does wear her 02 2L nc daily.  Sleep apnea needs supplies is paying out of pocket     The following portions of the patient's history were reviewed and updated as appropriate: allergies, current medications, past family history, past medical history, past social history, past surgical history and problem list.    Objective   Vital Signs:  There were no vitals taken for this visit.  Estimated body mass index is 45.17 kg/m² as calculated from the following:    Height as of 12/9/22: 160 cm (63\").    Weight as of 12/9/22: 116 kg (255 lb).    Class 3 Severe Obesity (BMI >=40). Obesity-related health conditions include the following: obstructive sleep apnea and GERD. Obesity is unchanged. BMI is is above average; BMI management plan is completed. We discussed portion control and increasing exercise.      Physical Exam   Constitutional: She is oriented to person, place, and time. She appears well-developed and well-nourished.   HENT:   Head: Normocephalic and atraumatic.   Right Ear: Hearing and external ear normal.   Left Ear: Hearing and external ear normal. "   Nose: Nose normal.   Mouth/Throat: Mouth/Lips are normal.Oropharynx is clear and moist.   Eyes: Pupils are equal, round, and reactive to light.   Pulmonary/Chest: Effort normal.   Neurological: She is alert and oriented to person, place, and time. She has normal strength.   Skin: Skin is warm and intact. Capillary refill takes 2 to 3 seconds. Turgor is normal. Her skin appears normal.  Psychiatric: She has a normal mood and affect. Her speech is normal and behavior is normal. Judgment and thought content normal.   Vitals reviewed.      Result Review :                Assessment and Plan   Diagnoses and all orders for this visit:    1. Other vascular headache (Primary)  -     ibuprofen (ADVIL,MOTRIN) 600 MG tablet; Take 1 tablet by mouth Every 6 (Six) Hours As Needed for Mild Pain.  Dispense: 90 tablet; Refill: 0    2. Obstructive sleep apnea  -     Miscellaneous DME    3. On home oxygen therapy         -    Continue oxygen at 2 L nc            Follow Up   Return for FU at next scheduled appt in January.  Patient was given instructions and counseling regarding her condition or for health maintenance advice. Please see specific information pulled into the AVS if appropriate.

## 2022-12-21 NOTE — ASSESSMENT & PLAN NOTE
Patient's (Body mass index is 45.17 kg/m².) indicates that they are morbidly obese (BMI > 40 or > 35 with obesity - related health condition) with health conditions that include obstructive sleep apnea, hypertension, diabetes mellitus, dyslipidemias and GERD . Weight is worsening. BMI is is above average; BMI management plan is completed. We discussed portion control and increasing exercise.

## 2023-01-04 ENCOUNTER — TELEPHONE (OUTPATIENT)
Dept: BARIATRICS/WEIGHT MGMT | Facility: CLINIC | Age: 53
End: 2023-01-04
Payer: COMMERCIAL

## 2023-01-04 NOTE — TELEPHONE ENCOUNTER
LVM: Patient was called to remind them of their new patient appointment and to bring completed paperwork or arrive 60 minutes early, also they were instructed to go to [place] [id number] to sign up for the patient portal, view seminar and complete the quiz before the appt. Also patient was advised this is a long appointment so expect to be here at least 1 to 2 hours. The patient was agreeable and voiced an understanding.     Patient has set up [id number]

## 2023-01-05 ENCOUNTER — OFFICE VISIT (OUTPATIENT)
Dept: BARIATRICS/WEIGHT MGMT | Facility: CLINIC | Age: 53
End: 2023-01-05
Payer: COMMERCIAL

## 2023-01-05 ENCOUNTER — PATIENT ROUNDING (BHMG ONLY) (OUTPATIENT)
Dept: BARIATRICS/WEIGHT MGMT | Facility: CLINIC | Age: 53
End: 2023-01-05
Payer: COMMERCIAL

## 2023-01-05 VITALS
WEIGHT: 255.4 LBS | SYSTOLIC BLOOD PRESSURE: 118 MMHG | DIASTOLIC BLOOD PRESSURE: 78 MMHG | OXYGEN SATURATION: 91 % | HEART RATE: 90 BPM | TEMPERATURE: 97.5 F | BODY MASS INDEX: 45.25 KG/M2 | HEIGHT: 63 IN

## 2023-01-05 DIAGNOSIS — E66.01 CLASS 3 SEVERE OBESITY DUE TO EXCESS CALORIES WITH SERIOUS COMORBIDITY AND BODY MASS INDEX (BMI) OF 45.0 TO 49.9 IN ADULT: Primary | ICD-10-CM

## 2023-01-05 DIAGNOSIS — I10 PRIMARY HYPERTENSION: ICD-10-CM

## 2023-01-05 DIAGNOSIS — G47.33 OBSTRUCTIVE SLEEP APNEA SYNDROME: ICD-10-CM

## 2023-01-05 DIAGNOSIS — K21.9 GASTROESOPHAGEAL REFLUX DISEASE, UNSPECIFIED WHETHER ESOPHAGITIS PRESENT: ICD-10-CM

## 2023-01-05 DIAGNOSIS — E78.5 HYPERLIPIDEMIA, UNSPECIFIED HYPERLIPIDEMIA TYPE: ICD-10-CM

## 2023-01-05 DIAGNOSIS — J44.9 CHRONIC OBSTRUCTIVE PULMONARY DISEASE, UNSPECIFIED COPD TYPE: ICD-10-CM

## 2023-01-05 DIAGNOSIS — E11.69 TYPE 2 DIABETES MELLITUS WITH OTHER SPECIFIED COMPLICATION, WITHOUT LONG-TERM CURRENT USE OF INSULIN: ICD-10-CM

## 2023-01-05 DIAGNOSIS — F17.200 TOBACCO DEPENDENCE: ICD-10-CM

## 2023-01-05 PROCEDURE — 99214 OFFICE O/P EST MOD 30 MIN: CPT | Performed by: NURSE PRACTITIONER

## 2023-01-05 RX ORDER — ALBUTEROL SULFATE 2.5 MG/3ML
SOLUTION RESPIRATORY (INHALATION)
COMMUNITY
Start: 2022-12-22

## 2023-01-05 NOTE — PROGRESS NOTES
Patient Care Team:  Nani Adkins, DNP, APRN as PCP - General (Family Medicine)      Subjective     Patient is a 52 y.o. female presents with morbid obesity and her Body mass index is 45.24 kg/m².     She is here for discussion of surgical weight loss options.  She stated she has been with the disease of obesity for year(s).  She stated she suffers from MIKE, GERD, HTN and hyperlipidemia and diabetes and morbid obesity due to her weight gain.  She stated that weight loss helps alleviate these symptoms.   She stated that she has tried other weight loss options such as low fat, atkins, KETO to help with weight loss.  She stated that she has attempted these conservative methods for weight loss without maintaining long term success.  Today she would like to discuss surgical weight loss options such as the Laparoscopic Sleeve Gastrectomy or the Laparoscopic R - Y Gastric Bypass.     She states she started struggling most with weight after her covid dx in 2020. She admits to gaining over 80 lbs and struggling with joint pain and shortness of breath.      Review of Systems   Constitutional: Negative.    Respiratory: Positive for shortness of breath and wheezing.         COPD   Cardiovascular: Negative.    Gastrointestinal: Negative.    Endocrine: Negative.    Musculoskeletal: Positive for arthralgias.   Psychiatric/Behavioral: Negative.         History  Past Medical History:   Diagnosis Date   • Asthma 2020    Covid and COPD   • COPD (chronic obstructive pulmonary disease) (HCC)    • COVID-19    • CPAP (continuous positive airway pressure) dependence    • Diabetes mellitus (HCC)    • GERD (gastroesophageal reflux disease) 2020   • Hyperlipidemia    • Hypertension    • Urinary tract infection       Past Surgical History:   Procedure Laterality Date   • BREAST AUGMENTATION     • LAPAROSCOPIC CHOLECYSTECTOMY     • TUBAL ABDOMINAL LIGATION        Social History     Socioeconomic History   • Marital status:     Tobacco Use   • Smoking status: Some Days     Packs/day: 1.00     Years: 35.00     Pack years: 35.00     Types: Cigarettes     Passive exposure: Past   • Smokeless tobacco: Never   Vaping Use   • Vaping Use: Never used   Substance and Sexual Activity   • Alcohol use: Never   • Drug use: Never   • Sexual activity: Not Currently     Partners: Male     Comment: Menopause      Family History   Problem Relation Age of Onset   • Heart disease Father    • Diabetes Father         2010   • Colon cancer Father    • Cancer Father         Colon   • Hyperlipidemia Father    • Stroke Father    • Hypertension Father    • Diabetes Mother         2000   • Lung cancer Mother    • Heart disease Mother    • Cancer Mother         Lung   • COPD Mother    • Hypertension Mother    • Alcohol abuse Brother    • Drug abuse Brother         1998   • Alcohol abuse Sister       No Known Allergies       Current Outpatient Medications:   •  atorvastatin (LIPITOR) 40 MG tablet, , Disp: , Rfl:   •  Blood Glucose Monitoring Suppl (OneTouch Verio Flex System) w/Device kit, Test blood glucose level 1-2 times per day DX:E11.9, Disp: , Rfl:   •  budesonide-formoterol (SYMBICORT) 80-4.5 MCG/ACT inhaler, Take  by mouth Every 6 (Six) Hours., Disp: , Rfl:   •  chlorthalidone (HYGROTON) 25 MG tablet, , Disp: , Rfl:   •  famotidine (PEPCID) 20 MG tablet, TAKE 1 TABLET TWICE DAILY., Disp: , Rfl:   •  ibuprofen (ADVIL,MOTRIN) 600 MG tablet, Take 1 tablet by mouth Every 6 (Six) Hours As Needed for Mild Pain., Disp: 90 tablet, Rfl: 0  •  Lancets (OneTouch Delica Plus [id number]) misc, USE TO TEST BLOOD GLUCOSE 1-2  TIMES A DAY  DX E11.9, Disp: , Rfl:   •  metFORMIN (GLUCOPHAGE) 500 MG tablet, , Disp: , Rfl:   •  metoprolol tartrate (LOPRESSOR) 25 MG tablet, , Disp: , Rfl:   •  O2 (OXYGEN), Inhale 2 L/min 1 (One) Time., Disp: , Rfl:   •  OneTouch Verio test strip, , Disp: , Rfl:   •  Spiriva Respimat 1.25 MCG/ACT aerosol solution inhaler, , Disp: , Rfl:   •   albuterol (PROVENTIL) (2.5 MG/3ML) 0.083% nebulizer solution, USE 1 VIAL IN NEBULIZER EVERY 4 HOURS AS NEEDED, Disp: , Rfl:     Objective     Vital Signs  Temp:  [97.5 °F (36.4 °C)] 97.5 °F (36.4 °C)  Heart Rate:  [90] 90  BP: (118)/(78) 118/78  Body mass index is 45.24 kg/m².      01/05/23  1005   Weight: 116 kg (255 lb 6.4 oz)       Physical Exam  Vitals reviewed.   Constitutional:       Appearance: She is obese.   Cardiovascular:      Rate and Rhythm: Normal rate and regular rhythm.   Pulmonary:      Effort: Pulmonary effort is normal.   Abdominal:      General: Bowel sounds are normal.      Palpations: Abdomen is soft.   Musculoskeletal:         General: Normal range of motion.   Skin:     General: Skin is warm and dry.   Neurological:      Mental Status: She is alert and oriented to person, place, and time.   Psychiatric:         Mood and Affect: Mood normal.         Behavior: Behavior normal.            Results Review:   I reviewed the patient's new clinical results.      Class 3 Severe Obesity (BMI >=40). Obesity-related health conditions include the following: obstructive sleep apnea, hypertension, diabetes mellitus, dyslipidemias and GERD. Obesity is worsening. BMI is is above average; BMI management plan is completed. We discussed portion control and increasing exercise.      Assessment & Plan   Diagnoses and all orders for this visit:    1. Class 3 severe obesity due to excess calories with serious comorbidity and body mass index (BMI) of 45.0 to 49.9 in adult (HCC) (Primary)  Assessment & Plan:  Patient's (Body mass index is 45.24 kg/m².) indicates that they are morbidly/severely obese (BMI > 40 or > 35 with obesity - related health condition) with health conditions that include obstructive sleep apnea, hypertension, diabetes mellitus, dyslipidemias and GERD . Weight is improving with treatment. BMI is is above average; BMI management plan is completed. We discussed portion control and increasing  exercise.       2. Obstructive sleep apnea syndrome  Comments:  Continue use of CPAP  Overview:  CPAP      3. Tobacco dependence  Comments:  Smoking cessation counseling provided    4. Gastroesophageal reflux disease, unspecified whether esophagitis present    5. Type 2 diabetes mellitus with other specified complication, without long-term current use of insulin (HCC)  Comments:  Monitor glucose levels and follow-up with PCP for medication adjustments as needed.    6. Primary hypertension  Comments:  Continue current regimen and monitor blood pressure levels at home.    7. Hyperlipidemia, unspecified hyperlipidemia type  Comments:  Scription meal plan prescribed.    8. Chronic obstructive pulmonary disease, unspecified COPD type (HCC)  Comments:  Continue current regimen.  Importance discussed about treating abdominal obesity.        I discussed the patient's findings and my recommendations with patient.     I have also recommended that she obtain a cardiac risk assessment and psychiatric evaluation prior to surgery consideration.    She has been provided a structured dietary regimen based off of her behavior.  I discussed with the patient the etiology of the disease of obesity and the potential comorbid conditions associated with this disease.  She was instructed to follow the dietary regimen and follow-up with our program in 1 month's time with any additional questions as they may arise during this time.  We emphasized on focusing on proteins and meals high in fiber as well as adequate hydration that exceed 64 ounces of water daily.    I explained that I anticipate the patient to lose weight prior to her next monthly visit.  I encouraged patient to have a reward day once a month.    1. Patient is a 52 y.o. female who has morbid obesity with Body mass index is 45.24 kg/m². and desires surgical weight loss. Patient has been advised that this surgery is considered to be elective major surgery that is typically done  laparoscopically. Patient is a potentially good surgical candidate. Patient will need to meet with the Bariatric Surgeon to further discuss surgical options. Patient has been advised that they will need to have a work-up prior to surgery. This work-up will include but is not limited to an EKG, an EGD to assess for H. Pylori and Bazan's esophagus, and a psychological evaluation, with additional testing as necessary. Pre-op testing will be ordered at next visit. Today the patient  received the 4 meals/day diet prescription, which was explained to patient.  Patient will see the dietitian today to further discuss goals for diet, exercise, and lifestyle. Patient has received intensive behavioral therapy for obesity today. I explained the pathophysiology of the disease and its storage component. We also discussed Dr. Dickerson' pearls of the program. Nutrition counseling ordered today.     2. Current comorbid condition of  sleep apnea, GERD, type 2 diabetes, hypertension, hyperlipidemia, COPD associated with her morbid obesity is reported to be stable on her current treatment regimen and medications. We anticipate the comorbid condition to improve as we address her morbid obesity.          Pre-op testing:     Seminar - Completed  EGD - Needed, but not yet ordered  H. Pylori - Will be done with EGD   H. Pylori Stool -  N/A    Psychological Evaluation - Needed, but not yet ordered    EKG - Needed, but not yet ordered    Cardiology - If EKG abnormal, cardiology will be ordered     TSH - Needed, but not yet ordered  Nicotine - NEEDS    Pulmonary Clearance - N/A   Drug Tests - N/A    Dietitian - Completed     Adela Eddy, APRN     01/05/23  14:17 CST

## 2023-01-05 NOTE — TELEPHONE ENCOUNTER
Spiriva Respimat 1.25 MCG/ACT aerosol solution inhaler    OneTouch Verio test strip       atorvastatin (LIPITOR) 40 MG tablet     metFORMIN (GLUCOPHAGE) 500 MG tablet     Lancets (OneTouch Delica Plus [id number])     metoprolol tartrate (LOPRESSOR) 25 MG tablet     chlorthalidone (HYGROTON) 25 MG tablet     famotidine (PEPCID) 20 MG tablet

## 2023-01-05 NOTE — PROGRESS NOTES
"Metabolic and Bariatric Surgery Adult Nutrition Assessment    Patient Name: Anastasiia Rubio   YOB: 1970   MRN: [id number]     Assessment Date:  01/05/2023     Reason for Visit: Initial Nutrition Assessment     Treatment Pathway: Preoperative Bariatric Surgery, Visit 1    Assessment    Anthropometrics   Wt Readings from Last 1 Encounters:   01/05/23 116 kg (255 lb 6.4 oz)     Ht Readings from Last 1 Encounters:   01/05/23 160 cm (63\")     BMI Readings from Last 1 Encounters:   01/05/23 45.24 kg/m²        Initial Weight/Date: 255.4 lbs (Jan 2022)  Weight Changes since last visit: n/a  Net Weight Change: n/a    Past Medical History:   Diagnosis Date   • Asthma 2020    Covid and COPD   • COPD (chronic obstructive pulmonary disease) ([facility])    • COVID-19    • CPAP (continuous positive airway pressure) dependence    • Diabetes mellitus ([facility])    • GERD (gastroesophageal reflux disease) 2020   • Hyperlipidemia    • Hypertension    • Urinary tract infection       Past Surgical History:   Procedure Laterality Date   • BREAST AUGMENTATION     • LAPAROSCOPIC CHOLECYSTECTOMY     • TUBAL ABDOMINAL LIGATION        Current Outpatient Medications   Medication Sig Dispense Refill   • albuterol (PROVENTIL) (2.5 MG/3ML) 0.083% nebulizer solution USE 1 VIAL IN NEBULIZER EVERY 4 HOURS AS NEEDED     • atorvastatin (LIPITOR) 40 MG tablet      • Blood Glucose Monitoring Suppl (OneTouch Verio Flex System) w/Device kit Test blood glucose level 1-2 times per day DX:E11.9     • budesonide-formoterol (SYMBICORT) 80-4.5 MCG/ACT inhaler Take  by mouth Every 6 (Six) Hours.     • chlorthalidone (HYGROTON) 25 MG tablet      • famotidine (PEPCID) 20 MG tablet TAKE 1 TABLET TWICE DAILY.     • ibuprofen (ADVIL,MOTRIN) 600 MG tablet Take 1 tablet by mouth Every 6 (Six) Hours As Needed for Mild Pain. 90 tablet 0   • Lancets (OneTouch Delica Plus [id number]) misc USE TO TEST BLOOD GLUCOSE 1-2  TIMES A DAY  DX E11.9     • metFORMIN (GLUCOPHAGE) 500 " MG tablet      • metoprolol tartrate (LOPRESSOR) 25 MG tablet      • O2 (OXYGEN) Inhale 2 L/min 1 (One) Time.     • OneTouch Verio test strip      • Spiriva Respimat 1.25 MCG/ACT aerosol solution inhaler        No current facility-administered medications for this visit.      No Known Allergies       Motivation for weight loss includes:  Live a healthier lifestyle. Breathe better. Wants to be able to go back to the gym.     Pertinent Social/Behavior/Environmental History: Is Homemaker, lives with spouse.     Nutrition Recall  Eating 1 meals daily, usually in evening.   Snacking - sometimes  Monitoring portions- not currently  Calculating Protein- not currently  Drinking sugary/carbonated beverages- none  Fluid Intake- drinking water with SPARK, drinking 4-5 bottles of water daily.       Barriers: Does have some emotional eating tendencies.    Exercise: some walking.  Recommended increasing physical activity, beyond normal daily habits, gradually working to reach ~30 minutes daily.     Nutrition Intervention  Nutrition education and nutrition coaching for behavior change provided.  Strategies used included Comprehensive education, Motivational Interviewing , Problem Solving, Skill Development for meal planning and Provided sample menus  Review of medical weight loss prescription 4 meal/day plan and reviewed nutritional needs for Preoperative Bariatric Surgery, Visit 1.  Self-monitoring strategies such as keeping a food journal (on paper or electronically) and calculating fluid/protein intake were discussed.    Recommended Diet Changes  Eat 4 meals per day with protein and vegetables at each meal, no carbs after meal 2., Protein goal: 65 gms., Eat vegetables first at each meal., Discussed protein guidelines for shakes and bars., Reduce snacking -use foods from free foods list only., Reduce fat, sugar, and/or salt in food choices., Choose more nutrient dense foods., Choose foods with increased fiber., Monitor portion  sizes using a food scale and/or measuring cup., Eliminate soda and sugar-sweetened beverages and Increase fluid intake to 64 ounces per day      Goals  1. Have four meals/day.   2. Measure servings of all foods.   3. Record intake, thinking about using MyFitnessPal.    Monitoring/Evaluation Plan  Anticipate follow up per program protocol. Continue collaboration of care with physician and treatment team.     Electronically signed by  Jeny Epps RDN, LD  01/05/2023 10:47 CST.

## 2023-01-05 NOTE — TELEPHONE ENCOUNTER
Rx Refill Note  Requested Prescriptions     Pending Prescriptions Disp Refills   • Spiriva Respimat 1.25 MCG/ACT aerosol solution inhaler     • OneTouch Verio test strip     • metFORMIN (GLUCOPHAGE) 500 MG tablet     • metoprolol tartrate (LOPRESSOR) 25 MG tablet     • Lancets (OneTouch Delica Plus [id number]) misc     • atorvastatin (LIPITOR) 40 MG tablet 90 tablet    • chlorthalidone (HYGROTON) 25 MG tablet     • famotidine (PEPCID) 20 MG tablet       Sig: Take 1 tablet by mouth 2 (Two) Times a Day.      Last office visit with prescribing clinician: 12/9/2022   Next office visit with prescribing clinician: 1/24/2023                         Would you like a call back once the refill request has been completed: [] Yes [] No    If the office needs to give you a call back, can they leave a voicemail: [] Yes [] No    Amna Kraus MA  01/05/23, 12:34 CST

## 2023-01-05 NOTE — ASSESSMENT & PLAN NOTE
Patient's (Body mass index is 45.24 kg/m².) indicates that they are morbidly/severely obese (BMI > 40 or > 35 with obesity - related health condition) with health conditions that include obstructive sleep apnea, hypertension, diabetes mellitus, dyslipidemias and GERD . Weight is improving with treatment. BMI is is above average; BMI management plan is completed. We discussed portion control and increasing exercise.

## 2023-01-05 NOTE — PROGRESS NOTES
January 5, 2023    Spoke with patient in office before leaving    [place]  Anastasiia Ramiro    My name is Annetta     I am  with [facility]  [address], [address]  [place] [address] [phone].    Before we get started may I verify your date of birth? 1970    I am calling to officially welcome you to our practice and ask about your recent visit. Is this a good time to talk? yes    Tell me about your visit with us. What things went well?  Everything went well, everyone was nice and helpful. Office was clean and nice       We're always looking for ways to make our patients' experiences even better. Do you have recommendations on ways we may improve?  None    Overall were you satisfied with your first visit to our practice? Very.        I appreciate you taking the time to speak with me today. Is there anything else I can do for you? No, not at this time      Thank you, and have a great day.

## 2023-01-06 LAB
25(OH)D3+25(OH)D2 SERPL-MCNC: 13.7 NG/ML (ref 30–100)
ALBUMIN SERPL-MCNC: 4.7 G/DL (ref 3.5–5.2)
ALBUMIN/GLOB SERPL: 1.8 G/DL
ALP SERPL-CCNC: 112 U/L (ref 39–117)
ALT SERPL-CCNC: 97 U/L (ref 1–33)
AST SERPL-CCNC: 50 U/L (ref 1–32)
BILIRUB SERPL-MCNC: 0.3 MG/DL (ref 0–1.2)
BUN SERPL-MCNC: 9 MG/DL (ref 6–20)
BUN/CREAT SERPL: 14.3 (ref 7–25)
CALCIUM SERPL-MCNC: 10 MG/DL (ref 8.6–10.5)
CHLORIDE SERPL-SCNC: 97 MMOL/L (ref 98–107)
CHOLEST SERPL-MCNC: 140 MG/DL (ref 0–200)
CO2 SERPL-SCNC: 29.5 MMOL/L (ref 22–29)
CREAT SERPL-MCNC: 0.63 MG/DL (ref 0.57–1)
EGFRCR SERPLBLD CKD-EPI 2021: 108.2 ML/MIN/1.73
ERYTHROCYTE [DISTWIDTH] IN BLOOD BY AUTOMATED COUNT: 12.3 % (ref 12.3–15.4)
GLOBULIN SER CALC-MCNC: 2.6 GM/DL
GLUCOSE SERPL-MCNC: 162 MG/DL (ref 65–99)
HBA1C MFR BLD: 6.7 % (ref 4.8–5.6)
HCT VFR BLD AUTO: 46.3 % (ref 34–46.6)
HDLC SERPL-MCNC: 39 MG/DL (ref 40–60)
HGB BLD-MCNC: 16.1 G/DL (ref 12–15.9)
LDLC SERPL CALC-MCNC: 63 MG/DL (ref 0–100)
MCH RBC QN AUTO: 30.5 PG (ref 26.6–33)
MCHC RBC AUTO-ENTMCNC: 34.8 G/DL (ref 31.5–35.7)
MCV RBC AUTO: 87.7 FL (ref 79–97)
PLATELET # BLD AUTO: 256 10*3/MM3 (ref 140–450)
POTASSIUM SERPL-SCNC: 3.3 MMOL/L (ref 3.5–5.2)
PROT SERPL-MCNC: 7.3 G/DL (ref 6–8.5)
RBC # BLD AUTO: 5.28 10*6/MM3 (ref 3.77–5.28)
SODIUM SERPL-SCNC: 139 MMOL/L (ref 136–145)
T4 FREE SERPL-MCNC: 1.08 NG/DL (ref 0.93–1.7)
TRIGL SERPL-MCNC: 237 MG/DL (ref 0–150)
TSH SERPL DL<=0.005 MIU/L-ACNC: 1.65 UIU/ML (ref 0.27–4.2)
VLDLC SERPL CALC-MCNC: 38 MG/DL (ref 5–40)
WBC # BLD AUTO: 12.56 10*3/MM3 (ref 3.4–10.8)

## 2023-01-06 RX ORDER — CHLORTHALIDONE 25 MG/1
25 TABLET ORAL DAILY
Qty: 90 TABLET | Refills: 1 | Status: SHIPPED | OUTPATIENT
Start: 2023-01-06

## 2023-01-06 RX ORDER — LANCETS 33 GAUGE
1 EACH MISCELLANEOUS DAILY
Qty: 100 EACH | Refills: 5 | Status: SHIPPED | OUTPATIENT
Start: 2023-01-06

## 2023-01-06 RX ORDER — BLOOD SUGAR DIAGNOSTIC
STRIP MISCELLANEOUS
Qty: 100 EACH | Refills: 5 | Status: SHIPPED | OUTPATIENT
Start: 2023-01-06

## 2023-01-06 RX ORDER — ATORVASTATIN CALCIUM 40 MG/1
40 TABLET, FILM COATED ORAL NIGHTLY
Qty: 90 TABLET | Refills: 0 | Status: SHIPPED | OUTPATIENT
Start: 2023-01-06

## 2023-01-06 RX ORDER — FAMOTIDINE 20 MG/1
20 TABLET, FILM COATED ORAL 2 TIMES DAILY
Qty: 180 TABLET | Refills: 0 | Status: SHIPPED | OUTPATIENT
Start: 2023-01-06

## 2023-01-06 RX ORDER — TIOTROPIUM BROMIDE INHALATION SPRAY 1.56 UG/1
2 SPRAY, METERED RESPIRATORY (INHALATION)
Qty: 4 G | Refills: 2 | Status: SHIPPED | OUTPATIENT
Start: 2023-01-06

## 2023-01-11 ENCOUNTER — TELEPHONE (OUTPATIENT)
Dept: FAMILY MEDICINE CLINIC | Facility: CLINIC | Age: 53
End: 2023-01-11
Payer: COMMERCIAL

## 2023-01-11 NOTE — TELEPHONE ENCOUNTER
KEY... regarding paperwork for [facility].      Spoke with patient.  She was able to have previous pulmonary doctor send records to home medical for her oxygen needs.  She is scheduled to see pulmonary here in April.  Nothing is needed from us at this time.

## 2023-01-12 NOTE — TELEPHONE ENCOUNTER
I spoke with patient on 1/11/22.  Patients previous pulmonary doctor sent required documentation to home medical for patients oxygen. Nothing is needed from our office at this time.  Patient will be seeing pulmonary here at [facility] in April.

## 2023-01-24 ENCOUNTER — OFFICE VISIT (OUTPATIENT)
Dept: FAMILY MEDICINE CLINIC | Facility: CLINIC | Age: 53
End: 2023-01-24
Payer: COMMERCIAL

## 2023-01-24 VITALS
BODY MASS INDEX: 44.83 KG/M2 | SYSTOLIC BLOOD PRESSURE: 115 MMHG | DIASTOLIC BLOOD PRESSURE: 74 MMHG | RESPIRATION RATE: 18 BRPM | WEIGHT: 253 LBS | TEMPERATURE: 98.6 F | HEIGHT: 63 IN | OXYGEN SATURATION: 97 %

## 2023-01-24 DIAGNOSIS — K21.00 GASTROESOPHAGEAL REFLUX DISEASE WITH ESOPHAGITIS WITHOUT HEMORRHAGE: ICD-10-CM

## 2023-01-24 DIAGNOSIS — E78.2 MIXED HYPERLIPIDEMIA: ICD-10-CM

## 2023-01-24 DIAGNOSIS — Z12.11 ENCOUNTER FOR SCREENING COLONOSCOPY: ICD-10-CM

## 2023-01-24 DIAGNOSIS — I10 PRIMARY HYPERTENSION: ICD-10-CM

## 2023-01-24 DIAGNOSIS — E11.65 TYPE 2 DIABETES MELLITUS WITH HYPERGLYCEMIA, WITHOUT LONG-TERM CURRENT USE OF INSULIN: Primary | ICD-10-CM

## 2023-01-24 DIAGNOSIS — R74.8 ELEVATED LIVER ENZYMES: ICD-10-CM

## 2023-01-24 DIAGNOSIS — E55.9 VITAMIN D DEFICIENCY: ICD-10-CM

## 2023-01-24 PROCEDURE — 3044F HG A1C LEVEL LT 7.0%: CPT | Performed by: NURSE PRACTITIONER

## 2023-01-24 PROCEDURE — 99214 OFFICE O/P EST MOD 30 MIN: CPT | Performed by: NURSE PRACTITIONER

## 2023-01-24 RX ORDER — ERGOCALCIFEROL 1.25 MG/1
50000 CAPSULE ORAL 2 TIMES WEEKLY
Qty: 12 CAPSULE | Refills: 5 | Status: SHIPPED | OUTPATIENT
Start: 2023-01-26

## 2023-01-24 RX ORDER — METFORMIN HYDROCHLORIDE 500 MG/1
2000 TABLET, EXTENDED RELEASE ORAL
COMMUNITY
End: 2023-01-24

## 2023-01-24 RX ORDER — METFORMIN HYDROCHLORIDE 500 MG/1
TABLET, EXTENDED RELEASE ORAL
Qty: 120 TABLET | Refills: 5 | Status: SHIPPED | OUTPATIENT
Start: 2023-01-24

## 2023-01-24 NOTE — PROGRESS NOTES
Chief Complaint  Diabetes (Follow up)    Subjective        Anastasiia Rubio presents to [facility]  History of Present Illness  Presents for follow up for elevated liver enzymes, hyperlipidemia stable with atorvastatin, diabetes stable with metformen, HTN stalbe with metoprolol and hygroton, COPD stable with spiriva, symbicort, and albuterol nebs and oxygen therapy, vit d def stable with ergocalciferol, gerd stable with famotidine, hearing loss, hx of covid, She also wants to schedule for colonoscopy.       Hypertension  This is a chronic problem. The current episode started more than 1 year ago. The problem has been gradually improving since onset. The problem is controlled. Associated symptoms include shortness of breath. Pertinent negatives include no blurred vision, chest pain, neck pain, orthopnea or PND. There are no associated agents to hypertension. Risk factors for coronary artery disease include diabetes mellitus, dyslipidemia, family history, obesity, post-menopausal state and sedentary lifestyle. Past treatments include beta blockers. Current antihypertension treatment includes beta blockers. The current treatment provides mild improvement. There are no compliance problems.  There is no history of kidney disease, heart failure or retinopathy.   Diabetes  She presents for her follow-up diabetic visit. She has type 2 diabetes mellitus. No MedicAlert identification noted. Her disease course has been stable. There are no hypoglycemic associated symptoms. There are no diabetic associated symptoms. Pertinent negatives for diabetes include no blurred vision and no chest pain. There are no hypoglycemic complications. Symptoms are stable. There are no diabetic complications. Pertinent negatives for diabetic complications include no retinopathy. Risk factors for coronary artery disease include diabetes mellitus, dyslipidemia, hypertension, obesity, post-menopausal and sedentary  lifestyle. Current diabetic treatment includes oral agent (dual therapy). She is compliant with treatment all of the time. Her weight is stable. She is following a generally unhealthy diet. When asked about meal planning, she reported none. She has not had a previous visit with a dietitian. She participates in exercise intermittently. There is no change in her home blood glucose trend. Her breakfast blood glucose is taken between 7-8 am. Her breakfast blood glucose range is generally  mg/dl. An ACE inhibitor/angiotensin II receptor blocker is not being taken. She does not see a podiatrist.Eye exam is not current.   Hyperlipidemia  This is a chronic problem. The current episode started more than 1 year ago. The problem is uncontrolled. Recent lipid tests were reviewed and are high. Exacerbating diseases include diabetes, hypothyroidism, liver disease and obesity. Factors aggravating her hyperlipidemia include beta blockers. Associated symptoms include shortness of breath. Pertinent negatives include no chest pain, focal weakness or myalgias. Current antihyperlipidemic treatment includes statins. The current treatment provides mild improvement of lipids. There are no compliance problems.  Risk factors for coronary artery disease include diabetes mellitus, dyslipidemia, family history, obesity, hypertension and post-menopausal.   COPD  She complains of cough and shortness of breath. There is no chest tightness, difficulty breathing, frequent throat clearing or hemoptysis. This is a chronic problem. The current episode started more than 1 year ago. The problem occurs intermittently. The cough is productive of purulent sputum. Associated symptoms include heartburn. Pertinent negatives include no chest pain, myalgias, PND or sore throat. Her symptoms are aggravated by minimal activity, exposure to fumes, strenuous activity, exposure to smoke, climbing stairs and lying down. Her symptoms are alleviated by  "beta-agonist, steroid inhaler and rest. She reports minimal improvement on treatment. Her symptoms are not alleviated by oral steroids and rest. There are no known risk factors for lung disease. Her past medical history is significant for COPD.   Heartburn  She complains of coughing and heartburn. She reports no chest pain or no sore throat. This is a chronic problem. The current episode started more than 1 year ago. The problem occurs occasionally. The problem has been gradually improving. The heartburn is located in the substernum. The heartburn is of mild intensity. The heartburn does not wake her from sleep. The heartburn does not limit her activity. The heartburn doesn't change with position. The symptoms are aggravated by certain foods and caffeine. Pertinent negatives include no anemia or muscle weakness. Risk factors include obesity and caffeine use. She has tried an antacid and a PPI for the symptoms. The treatment provided mild relief. Past procedures do not include an EGD or H. pylori antibody titer.     The following portions of the patient's history were reviewed and updated as appropriate: allergies, current medications, past family history, past medical history, past social history, past surgical history and problem list.      Objective   Vital Signs:  /74 (BP Location: Left arm, Patient Position: Sitting, Cuff Size: Large Adult)   Temp 98.6 °F (37 °C) (Infrared)   Resp 18   Ht 160 cm (63\") Comment: per patient  Wt 115 kg (253 lb)   SpO2 97%   BMI 44.82 kg/m²   Estimated body mass index is 44.82 kg/m² as calculated from the following:    Height as of this encounter: 160 cm (63\").    Weight as of this encounter: 115 kg (253 lb).       Class 3 Severe Obesity (BMI >=40). Obesity-related health conditions include the following: hypertension, diabetes mellitus, dyslipidemias and GERD. Obesity is unchanged. BMI is is above average; BMI management plan is completed. We discussed portion control, " increasing exercise and joining a fitness center or start home based exercise program.      Physical Exam  Vitals and nursing note reviewed.   Constitutional:       General: She is awake.      Appearance: Normal appearance. She is well-developed and well-groomed.   HENT:      Head: Normocephalic and atraumatic.      Right Ear: Hearing, tympanic membrane, ear canal and external ear normal.      Left Ear: Hearing, tympanic membrane, ear canal and external ear normal.      Nose: Nose normal.      Mouth/Throat:      Lips: Pink.      Pharynx: Oropharynx is clear.   Eyes:      General: Lids are normal.      Conjunctiva/sclera: Conjunctivae normal.   Cardiovascular:      Rate and Rhythm: Normal rate and regular rhythm.      Heart sounds: Normal heart sounds.   Pulmonary:      Effort: Pulmonary effort is normal.      Breath sounds: Normal breath sounds and air entry.   Musculoskeletal:      Cervical back: Full passive range of motion without pain.      Right lower leg: No edema.      Left lower leg: No edema.   Lymphadenopathy:      Head:      Right side of head: No submental, submandibular or tonsillar adenopathy.      Left side of head: No submental, submandibular or tonsillar adenopathy.   Skin:     General: Skin is warm and dry.   Neurological:      Mental Status: She is alert.      Sensory: Sensation is intact.      Motor: Motor function is intact.      Coordination: Coordination is intact.      Gait: Gait is intact.   Psychiatric:         Attention and Perception: Attention and perception normal.         Mood and Affect: Mood and affect normal.         Speech: Speech normal.         Behavior: Behavior normal. Behavior is cooperative.         Thought Content: Thought content normal.         Cognition and Memory: Cognition and memory normal.         Judgment: Judgment normal.        Result Review :                   Assessment and Plan   Diagnoses and all orders for this visit:    1. Type 2 diabetes mellitus with  hyperglycemia, without long-term current use of insulin (HCC) (Primary)  -     metFORMIN ER (GLUCOPHAGE-XR) 500 MG 24 hr tablet; 2 tablets in the morning and 2 tablets at night  Dispense: 120 tablet; Refill: 5    2. Primary hypertension       -  Continue metoprolol as prescribed       -  Continue hygroton as prescribed    3. Elevated liver enzymes  -     US Liver  -     Comprehensive metabolic panel; Future    4. Mixed hyperlipidemia         - continue atorvastatin as prescribed      - eat baked foods instead of fried or fast food    5. Gastroesophageal reflux disease with esophagitis without hemorrhage      - continue famotidine as perscribed        6. Encounter for screening colonoscopy  -     Ambulatory Referral to Gastroenterology    7. Vitamin D deficiency  -     ergocalciferol (ERGOCALCIFEROL) 1.25 MG (19996 UT) capsule; Take 1 capsule by mouth 2 (Two) Times a Week.  Dispense: 12 capsule; Refill: 5  -     Vitamin D 25 hydroxy; Future             Follow Up   Return in about 3 months (around 4/24/2023).  Patient was given instructions and counseling regarding her condition or for health maintenance advice. Please see specific information pulled into the AVS if appropriate.     Electronically signed by Nani Adkins, CHANDU, APRN, 01/31/23, 8:03 PM CST.

## 2023-02-06 ENCOUNTER — OFFICE VISIT (OUTPATIENT)
Dept: CARDIOLOGY | Facility: CLINIC | Age: 53
End: 2023-02-06
Payer: COMMERCIAL

## 2023-02-06 VITALS
HEIGHT: 63 IN | WEIGHT: 254 LBS | DIASTOLIC BLOOD PRESSURE: 73 MMHG | BODY MASS INDEX: 45 KG/M2 | HEART RATE: 97 BPM | SYSTOLIC BLOOD PRESSURE: 140 MMHG

## 2023-02-06 DIAGNOSIS — R00.0 SINUS TACHYCARDIA: Primary | ICD-10-CM

## 2023-02-06 DIAGNOSIS — I10 PRIMARY HYPERTENSION: ICD-10-CM

## 2023-02-06 PROCEDURE — 99204 OFFICE O/P NEW MOD 45 MIN: CPT | Performed by: INTERNAL MEDICINE

## 2023-02-06 PROCEDURE — 93000 ELECTROCARDIOGRAM COMPLETE: CPT | Performed by: INTERNAL MEDICINE

## 2023-02-06 RX ORDER — LEVOCETIRIZINE DIHYDROCHLORIDE 5 MG/1
5 TABLET, FILM COATED ORAL EVERY EVENING
COMMUNITY

## 2023-02-06 RX ORDER — OXYBUTYNIN CHLORIDE 10 MG/1
10 TABLET, EXTENDED RELEASE ORAL DAILY
COMMUNITY

## 2023-02-06 RX ORDER — METOPROLOL SUCCINATE 50 MG/1
50 TABLET, EXTENDED RELEASE ORAL DAILY
Qty: 90 TABLET | Refills: 3 | Status: SHIPPED | OUTPATIENT
Start: 2023-02-06

## 2023-02-06 RX ORDER — PANTOPRAZOLE SODIUM 40 MG/1
40 TABLET, DELAYED RELEASE ORAL DAILY
COMMUNITY

## 2023-02-06 NOTE — PROGRESS NOTES
"Subjective    Anastasiia Rubio is a 53 y.o. female. Self referred for \"fast heart beat\".    History of Present Illness     SINUS TACH:  This came after COVID in 7/20. She was started on Lopressor and that has controlled the HR and palpitations \"x for a rare skip\". Is concerned about being on BB long-term and wants assurance that this is ok. EKG today is nsr, poor-r., lowv and bala. No serials. Labs have been checked by her pcp and are ok. Has been advised to quit smoking but has no desire to at this time. She has had ECHO's and stress-tests in [facility] and they are \"ok\".    The following portions of the patient's history were reviewed and updated as appropriate: allergies, current medications, past family history, past medical history, past social history, past surgical history and problem list.    Patient Active Problem List   Diagnosis   • Amenorrhea   • Cigarette smoker   • Mixed incontinence   • Actinic keratitis   • Asthma   • COPD (chronic obstructive pulmonary disease) ([facility])   • Cough   • Depression   • Diabetes mellitus, type 2 ([facility])   • Elevated hemoglobin ([facility])   • Erythrocytosis   • ET (eustachian tube disorder)   • Fracture of middle phalanx of finger   • Gas pain   • Hearing loss   • GERD (gastroesophageal reflux disease)   • Hemoptysis   • History of 2019 novel coronavirus disease (COVID-19)   • Hyperlipidemia   • Hypertension   • Hypothyroidism   • Left-sided low back pain without sciatica   • Leg edema   • Leukocytosis   • Loss of smell   • Low HDL (under 40)   • Class 3 severe obesity due to excess calories with serious comorbidity and body mass index (BMI) of 45.0 to 49.9 in adult ([facility])   • Prediabetes   • Seborrheic keratosis   • Stress incontinence, female   • Tobacco abuse   • Tobacco dependence   • Transaminitis   • Weight gain   • Amenorrhea   • Obstructive sleep apnea syndrome       No Known Allergies    Family History   Problem Relation Age of Onset   • Heart failure Mother    • Diabetes Mother         " 2000   • Lung cancer Mother    • Heart disease Mother    • Cancer Mother         Lung   • COPD Mother    • Hypertension Mother    • Heart attack Father    • Heart disease Father    • Diabetes Father         2010   • Colon cancer Father    • Cancer Father         Colon   • Hyperlipidemia Father    • Stroke Father    • Hypertension Father    • Alcohol abuse Sister    • Alcohol abuse Brother    • Drug abuse Brother         1998       Social History     Socioeconomic History   • Marital status:    Tobacco Use   • Smoking status: Some Days     Packs/day: 1.00     Years: 35.00     Pack years: 35.00     Types: Cigarettes     Passive exposure: Past   • Smokeless tobacco: Never   Vaping Use   • Vaping Use: Never used   Substance and Sexual Activity   • Alcohol use: Never   • Drug use: Never   • Sexual activity: Not Currently     Partners: Male     Comment: Menopause         Current Outpatient Medications:   •  albuterol (PROVENTIL) (2.5 MG/3ML) 0.083% nebulizer solution, USE 1 VIAL IN NEBULIZER EVERY 4 HOURS AS NEEDED, Disp: , Rfl:   •  atorvastatin (LIPITOR) 40 MG tablet, Take 1 tablet by mouth Every Night., Disp: 90 tablet, Rfl: 0  •  Blood Glucose Monitoring Suppl (OneTouch Verio Flex System) w/Device kit, Test blood glucose level 1-2 times per day DX:E11.9, Disp: , Rfl:   •  budesonide-formoterol (SYMBICORT) 80-4.5 MCG/ACT inhaler, Take  by mouth Every 6 (Six) Hours., Disp: , Rfl:   •  chlorthalidone (HYGROTON) 25 MG tablet, Take 1 tablet by mouth Daily., Disp: 90 tablet, Rfl: 1  •  ergocalciferol (ERGOCALCIFEROL) 1.25 MG (10209 UT) capsule, Take 1 capsule by mouth 2 (Two) Times a Week., Disp: 12 capsule, Rfl: 5  •  famotidine (PEPCID) 20 MG tablet, Take 1 tablet by mouth 2 (Two) Times a Day., Disp: 180 tablet, Rfl: 0  •  ibuprofen (ADVIL,MOTRIN) 600 MG tablet, Take 1 tablet by mouth Every 6 (Six) Hours As Needed for Mild Pain., Disp: 90 tablet, Rfl: 0  •  Lancets (OneTouch Delica Plus [id number]) misc, 1 each by  "Other route Daily., Disp: 100 each, Rfl: 5  •  levocetirizine (XYZAL) 5 MG tablet, Take 5 mg by mouth Every Evening., Disp: , Rfl:   •  metFORMIN ER (GLUCOPHAGE-XR) 500 MG 24 hr tablet, 2 tablets in the morning and 2 tablets at night, Disp: 120 tablet, Rfl: 5  •  O2 (OXYGEN), Inhale 2 L/min 1 (One) Time., Disp: , Rfl:   •  OneTouch Verio test strip, Check FBS daily and prn for Diabetes, Disp: 100 each, Rfl: 5  •  oxybutynin XL (DITROPAN-XL) 10 MG 24 hr tablet, Take 10 mg by mouth Daily., Disp: , Rfl:   •  pantoprazole (PROTONIX) 40 MG EC tablet, Take 40 mg by mouth Daily., Disp: , Rfl:   •  Spiriva Respimat 1.25 MCG/ACT aerosol solution inhaler, Inhale 2 puffs Daily., Disp: 4 g, Rfl: 2  •  metoprolol succinate XL (TOPROL-XL) 50 MG 24 hr tablet, Take 1 tablet by mouth Daily., Disp: 90 tablet, Rfl: 3    Past Surgical History:   Procedure Laterality Date   • BREAST AUGMENTATION     • LAPAROSCOPIC CHOLECYSTECTOMY     • TUBAL ABDOMINAL LIGATION         Review of Systems   Constitutional: Negative for activity change and fatigue.   Respiratory: Negative for shortness of breath.    Cardiovascular: Positive for palpitations. Negative for chest pain and leg swelling.   Gastrointestinal: Negative for abdominal pain, anal bleeding and blood in stool.   Genitourinary: Negative for difficulty urinating and hematuria.       /73   Pulse 97   Ht 160 cm (63\")   Wt 115 kg (254 lb)   LMP  (LMP Unknown) Comment: lmp January  BMI 44.99 kg/m²   Procedures    Objective   Physical Exam  Constitutional:       Appearance: She is obese.   Cardiovascular:      Rate and Rhythm: Normal rate and regular rhythm.      Heart sounds: Normal heart sounds. No murmur heard.    No friction rub. No gallop.   Pulmonary:      Effort: Pulmonary effort is normal.      Breath sounds: No wheezing or rales.   Abdominal:      General: Bowel sounds are normal.      Tenderness: There is no abdominal tenderness.   Musculoskeletal:      Right lower leg: No " edema.      Left lower leg: No edema.   Skin:     General: Skin is warm and dry.   Neurological:      General: No focal deficit present.      Mental Status: She is oriented to person, place, and time.   Psychiatric:         Mood and Affect: Mood normal.         Assessment & Plan   Diagnoses and all orders for this visit:    1. Sinus tachycardia (Primary)  -     ECG 12 Lead    2. Primary hypertension  -     metoprolol succinate XL (TOPROL-XL) 50 MG 24 hr tablet; Take 1 tablet by mouth Daily.  Dispense: 90 tablet; Refill: 3             Return if symptoms worsen or fail to improve.  Orders Placed This Encounter   Procedures   • ECG 12 Lead     Order Specific Question:   Reason for Exam:     Answer:   FAST HEART BEAT.     Order Specific Question:   Release to patient     Answer:   Routine Release

## 2023-02-07 ENCOUNTER — OFFICE VISIT (OUTPATIENT)
Dept: BARIATRICS/WEIGHT MGMT | Facility: CLINIC | Age: 53
End: 2023-02-07
Payer: COMMERCIAL

## 2023-02-07 VITALS
BODY MASS INDEX: 45.22 KG/M2 | HEART RATE: 100 BPM | WEIGHT: 255.2 LBS | DIASTOLIC BLOOD PRESSURE: 77 MMHG | SYSTOLIC BLOOD PRESSURE: 121 MMHG | HEIGHT: 63 IN | OXYGEN SATURATION: 98 % | TEMPERATURE: 98.4 F

## 2023-02-07 DIAGNOSIS — E11.69 TYPE 2 DIABETES MELLITUS WITH OTHER SPECIFIED COMPLICATION, WITHOUT LONG-TERM CURRENT USE OF INSULIN: ICD-10-CM

## 2023-02-07 DIAGNOSIS — K21.00 GASTROESOPHAGEAL REFLUX DISEASE WITH ESOPHAGITIS WITHOUT HEMORRHAGE: ICD-10-CM

## 2023-02-07 DIAGNOSIS — E66.01 CLASS 3 SEVERE OBESITY DUE TO EXCESS CALORIES WITH SERIOUS COMORBIDITY AND BODY MASS INDEX (BMI) OF 45.0 TO 49.9 IN ADULT: Primary | ICD-10-CM

## 2023-02-07 DIAGNOSIS — I10 PRIMARY HYPERTENSION: ICD-10-CM

## 2023-02-07 DIAGNOSIS — E78.2 MIXED HYPERLIPIDEMIA: ICD-10-CM

## 2023-02-07 PROCEDURE — 99213 OFFICE O/P EST LOW 20 MIN: CPT | Performed by: NURSE PRACTITIONER

## 2023-02-07 NOTE — PROGRESS NOTES
"Metabolic and Bariatric Surgery Adult Nutrition Assessment    Patient Name: Anastasiia Rubio   YOB: 1970   MRN: [id number]     Assessment Date:  02/07/2023     Reason for Visit: Follow-up Nutrition Assessment     Treatment Pathway: Preoperative Bariatric Surgery, Visit 2    Assessment    Anthropometrics   Wt Readings from Last 1 Encounters:   02/07/23 116 kg (255 lb 3.2 oz)     Ht Readings from Last 1 Encounters:   02/07/23 160 cm (63\")     BMI Readings from Last 1 Encounters:   02/07/23 45.21 kg/m²        Initial Weight/Date: 255.4 lbs (Jan 2023)  Weight Changes since last visit: -0.2 lbs  Net Weight Change: -0.2 lbs    Past Medical History:   Diagnosis Date   • Asthma 2020    Covid and COPD   • COPD (chronic obstructive pulmonary disease) ([facility])    • COVID-19    • CPAP (continuous positive airway pressure) dependence    • Diabetes mellitus ([facility])    • GERD (gastroesophageal reflux disease) 2020   • Hyperlipidemia    • Hypertension    • Urinary tract infection       Past Surgical History:   Procedure Laterality Date   • BREAST AUGMENTATION     • LAPAROSCOPIC CHOLECYSTECTOMY     • TUBAL ABDOMINAL LIGATION        Current Outpatient Medications   Medication Sig Dispense Refill   • albuterol (PROVENTIL) (2.5 MG/3ML) 0.083% nebulizer solution USE 1 VIAL IN NEBULIZER EVERY 4 HOURS AS NEEDED     • atorvastatin (LIPITOR) 40 MG tablet Take 1 tablet by mouth Every Night. 90 tablet 0   • Blood Glucose Monitoring Suppl (OneTouch Verio Flex System) w/Device kit Test blood glucose level 1-2 times per day DX:E11.9     • budesonide-formoterol (SYMBICORT) 80-4.5 MCG/ACT inhaler Take  by mouth Every 6 (Six) Hours.     • chlorthalidone (HYGROTON) 25 MG tablet Take 1 tablet by mouth Daily. 90 tablet 1   • ergocalciferol (ERGOCALCIFEROL) 1.25 MG (12125 UT) capsule Take 1 capsule by mouth 2 (Two) Times a Week. 12 capsule 5   • famotidine (PEPCID) 20 MG tablet Take 1 tablet by mouth 2 (Two) Times a Day. 180 tablet 0   • " ibuprofen (ADVIL,MOTRIN) 600 MG tablet Take 1 tablet by mouth Every 6 (Six) Hours As Needed for Mild Pain. 90 tablet 0   • Lancets (OneTouch Delica Plus [id number]) misc 1 each by Other route Daily. 100 each 5   • levocetirizine (XYZAL) 5 MG tablet Take 5 mg by mouth Every Evening.     • metFORMIN ER (GLUCOPHAGE-XR) 500 MG 24 hr tablet 2 tablets in the morning and 2 tablets at night 120 tablet 5   • metoprolol succinate XL (TOPROL-XL) 50 MG 24 hr tablet Take 1 tablet by mouth Daily. 90 tablet 3   • O2 (OXYGEN) Inhale 2 L/min 1 (One) Time.     • OneTouch Verio test strip Check FBS daily and prn for Diabetes 100 each 5   • oxybutynin XL (DITROPAN-XL) 10 MG 24 hr tablet Take 10 mg by mouth Daily.     • pantoprazole (PROTONIX) 40 MG EC tablet Take 40 mg by mouth Daily.     • Spiriva Respimat 1.25 MCG/ACT aerosol solution inhaler Inhale 2 puffs Daily. 4 g 2     No current facility-administered medications for this visit.      No Known Allergies         Nutrition Recall  Eating 2 meals daily   Food recall reviewed.  Snacking - states she snacks on vegetables.  Monitoring portions- Most of the time.  Calculating Protein- estimates 72 grams.  Drinking sugary/carbonated beverages- sometimes.  Fluid Intake- estimates < 64 ounces.    Barriers: Went on vacation shortly after first appointment, didn't really get started on meal plan this month. States she gets very nauseated, complains of abdominal pain and distention with eating- does say PCP has ordered EGD, colonoscopy, and liver US for workup.     Exercise: n/a  Recommended increasing physical activity, beyond normal daily habits, gradually working to reach ~30 minutes daily.     Nutrition Intervention  Nutrition education and nutrition coaching for behavior change provided.  Strategies used included Comprehensive education, Motivational Interviewing , Problem Solving, Skill Development for meal planning, measuring portions, & reading food labels,  and Ongoing  reinforcement  Review of medical weight loss prescription 4 meal/day plan and reviewed nutritional needs for Preoperative Bariatric Surgery, Visit 2.  Self-monitoring strategies such as keeping a food journal (on paper or electronically) and calculating fluid/protein intake were discussed.    Recommended Diet Changes  Eat 4 meals per day with protein and vegetables at each meal, no carbs after meal 2., Protein goal: 65 gms., Eat vegetables first at each meal., Discussed protein guidelines for shakes and bars., Reduce snacking -use foods from free foods list only., Reduce fat, sugar, and/or salt in food choices., Choose more nutrient dense foods., Choose foods with increased fiber., Monitor portion sizes using a food scale and/or measuring cup., Eliminate soda and sugar-sweetened beverages and Increase fluid intake to 64 ounces per day      Goals  1. Have four meals daily and refer to meal plan prior to each meal.  2. Be consistent in measuring portions.  3. Record food intake in log as well as record GI symptoms.     Monitoring/Evaluation Plan  Anticipate follow up per program protocol. Continue collaboration of care with physician and treatment team.     Electronically signed by  Jeny Epps RDN, LD  02/07/2023 15:35 CST.

## 2023-02-07 NOTE — PROGRESS NOTES
"Patient Care Team:  Nani Adkins, DNP, APRN as PCP - General (Family Medicine)    Reason for Visit:  Surgical Weight loss, V2    Subjective   Anastasiia Rubio is a 53 y.o. female.     Anastasiia is here for follow-up and continued medical management of her morbid obesity.  She is currently on a prescription diet.  Anastasiia previously was to apply dietary changes such as following the meal plan as directed.  She admits to eating 2 meals per day.  As a result she remained the same weight since her last visit.    She states it took a few weeks for her to get started on the meal plan due to a vacation.     Review Of Systems:  Review of Systems   Constitutional: Positive for fatigue.   Respiratory: Negative.    Cardiovascular: Negative.    Gastrointestinal: Positive for abdominal pain, constipation, nausea and GERD.   Endocrine: Negative.    Musculoskeletal: Negative.    Psychiatric/Behavioral: Positive for sleep disturbance.         The following portions of the patient's history were reviewed and updated as appropriate: allergies, current medications, past family history, past medical history, past social history, past surgical history, and problem list.    Objective   /77 (BP Location: Right arm, Patient Position: Sitting, Cuff Size: Adult)   Pulse 100   Temp 98.4 °F (36.9 °C)   Ht 160 cm (63\")   Wt 116 kg (255 lb 3.2 oz)   LMP  (LMP Unknown) Comment: lmp January  SpO2 98%   BMI 45.21 kg/m²       02/07/23  1446   Weight: 116 kg (255 lb 3.2 oz)       Physical Exam  Vitals reviewed.   Constitutional:       Appearance: She is obese.   Cardiovascular:      Rate and Rhythm: Normal rate and regular rhythm.   Pulmonary:      Effort: Pulmonary effort is normal.   Abdominal:      General: Bowel sounds are normal.      Palpations: Abdomen is soft.   Skin:     General: Skin is warm and dry.   Neurological:      Mental Status: She is alert and oriented to person, place, and time.   Psychiatric:         Mood and Affect: Mood " normal.         Behavior: Behavior normal.         Class 3 Severe Obesity (BMI >=40). Obesity-related health conditions include the following: hypertension, diabetes mellitus, dyslipidemias and GERD. Obesity is unchanged. BMI is is above average; BMI management plan is completed. We discussed portion control and increasing exercise.     Assessment & Plan   Diagnoses and all orders for this visit:    1. Class 3 severe obesity due to excess calories with serious comorbidity and body mass index (BMI) of 45.0 to 49.9 in adult ([facility]) (Primary)  Assessment & Plan:  Patient's (Body mass index is 45.21 kg/m².) indicates that they are morbidly/severely obese (BMI > 40 or > 35 with obesity - related health condition) with health conditions that include hypertension, diabetes mellitus and dyslipidemias . Weight is unchanged. BMI  is above average; BMI management plan is completed. We discussed portion control and increasing exercise.     Orders:  -     Ambulatory Referral to Psychiatry    2. Mixed hyperlipidemia  Assessment & Plan:  Lipid abnormalities are unchanged.  Nutritional counseling was provided. and The patient was referred to the dietician.  Lipids will be reassessed with PCP.      3. Primary hypertension    4. Type 2 diabetes mellitus with other specified complication, without long-term current use of insulin ([facility])  Comments:  Advised to monitor glucose levels and follow-up with PCP for adjustments as needed.  Prescription meal plan reviewed today.    5. Gastroesophageal reflux disease with esophagitis without hemorrhage  Comments:  Continue Pepcid.  Discussed that EGD will be ordered at next appointment.       Anastasiia Rubio was seen today for follow-up, obesity, nutrition counseling and weight loss.  She has remained the same weight since her last visit.  Today we discussed healthy changes in lifestyle, diet, and exercise. Dietician consultation obtained.  Anastasiia Rubio had received handouts to her explaining the  recommendation on portion sizes/appetite control/reading nutrition labels.   Intensive behavioral therapy for obesity was done today as well.     Goals for this month are:   1. Work towards fully eliminating soda intake   2. F/u with PCP for smoking cessation assistance     Follow up in one month for a weight recheck. Preoperative workup pending smoking cessation. Advised to discuss health coping mechanisms with psychiatry.

## 2023-02-09 ENCOUNTER — PATIENT ROUNDING (BHMG ONLY) (OUTPATIENT)
Dept: CARDIOLOGY | Facility: CLINIC | Age: 53
End: 2023-02-09
Payer: COMMERCIAL

## 2023-02-09 NOTE — PROGRESS NOTES
A [employer] message has been sent to the patient for PATIENT ROUNDING with [facility].

## 2023-02-22 NOTE — ASSESSMENT & PLAN NOTE
Patient's (Body mass index is 45.21 kg/m².) indicates that they are morbidly/severely obese (BMI > 40 or > 35 with obesity - related health condition) with health conditions that include hypertension, diabetes mellitus and dyslipidemias . Weight is unchanged. BMI  is above average; BMI management plan is completed. We discussed portion control and increasing exercise.

## 2023-03-02 ENCOUNTER — OFFICE VISIT (OUTPATIENT)
Dept: GASTROENTEROLOGY | Facility: CLINIC | Age: 53
End: 2023-03-02
Payer: COMMERCIAL

## 2023-03-02 VITALS
HEIGHT: 63 IN | OXYGEN SATURATION: 96 % | SYSTOLIC BLOOD PRESSURE: 126 MMHG | WEIGHT: 255 LBS | TEMPERATURE: 97.1 F | DIASTOLIC BLOOD PRESSURE: 78 MMHG | BODY MASS INDEX: 45.18 KG/M2 | HEART RATE: 107 BPM

## 2023-03-02 DIAGNOSIS — R10.13 EPIGASTRIC PAIN: ICD-10-CM

## 2023-03-02 DIAGNOSIS — K59.09 CHRONIC CONSTIPATION: ICD-10-CM

## 2023-03-02 DIAGNOSIS — Z12.11 ENCOUNTER FOR SCREENING FOR MALIGNANT NEOPLASM OF COLON: Primary | ICD-10-CM

## 2023-03-02 PROCEDURE — 99214 OFFICE O/P EST MOD 30 MIN: CPT | Performed by: NURSE PRACTITIONER

## 2023-03-02 NOTE — PROGRESS NOTES
[facility]    Primary Physician Nani Adkins, CHANDU, APRN    3/2/2023    Anastasiia Rubio   1970      Chief Complaint   Patient presents with   • Colonoscopy   • Endoscopy   viv pain    Subjective     HPI    Anastasiia Rubio is a 53 y.o. female who presents as a referral for preventative maintenance. No wt loss. No BRBPR. No melena.       Chronic constipation  Started about 1 year ago. Has bm once every 2 days. Strains a lot with bm. Takes fiber supplement daily. Has tried otc laxative that does not help.  No rectal bleeding. No weight loss.         Abdominal pain  This started 2 year ago. Occurs daily. Location viv area. Described as a cramping and burn.  Takes protonix daily that does not help. Takes pepcid daily.  Eating makes worse. Some nausea. Gallbladder is gone ( had stones). She is diabetic ( blood sugar running 170, A1C 6.7)  No vomiting. No asa, nsaids, or arthritis meds. No black stool.         No history of egd or colonoscopy.       There is a family history of colon polyps: mother. There is a family history of colon cancer: father in his 50's.     Past Medical History:   Diagnosis Date   • Asthma 2020    Covid and COPD   • COPD (chronic obstructive pulmonary disease) (HCC)    • COVID-19    • CPAP (continuous positive airway pressure) dependence    • Diabetes mellitus (HCC)    • GERD (gastroesophageal reflux disease) 2020   • Hyperlipidemia    • Hypertension    • Urinary tract infection        Past Surgical History:   Procedure Laterality Date   • BREAST AUGMENTATION     • LAPAROSCOPIC CHOLECYSTECTOMY     • TUBAL ABDOMINAL LIGATION         Outpatient Medications Marked as Taking for the 3/2/23 encounter (Office Visit) with Lola Wynne, AMY   Medication Sig Dispense Refill   • albuterol (PROVENTIL) (2.5 MG/3ML) 0.083% nebulizer solution USE 1 VIAL IN NEBULIZER EVERY 4 HOURS AS NEEDED     • atorvastatin (LIPITOR) 40 MG tablet Take 1 tablet by mouth Every Night. 90 tablet 0    • Blood Glucose Monitoring Suppl (OneTouch Verio Flex System) w/Device kit Test blood glucose level 1-2 times per day DX:E11.9     • budesonide-formoterol (SYMBICORT) 80-4.5 MCG/ACT inhaler Take  by mouth Every 6 (Six) Hours.     • chlorthalidone (HYGROTON) 25 MG tablet Take 1 tablet by mouth Daily. 90 tablet 1   • ergocalciferol (ERGOCALCIFEROL) 1.25 MG (82771 UT) capsule Take 1 capsule by mouth 2 (Two) Times a Week. 12 capsule 5   • famotidine (PEPCID) 20 MG tablet Take 1 tablet by mouth 2 (Two) Times a Day. 180 tablet 0   • Lancets (OneTouch Delica Plus [id number]) misc 1 each by Other route Daily. 100 each 5   • metFORMIN ER (GLUCOPHAGE-XR) 500 MG 24 hr tablet 2 tablets in the morning and 2 tablets at night 120 tablet 5   • metoprolol succinate XL (TOPROL-XL) 50 MG 24 hr tablet Take 1 tablet by mouth Daily. 90 tablet 3   • O2 (OXYGEN) Inhale 2 L/min 1 (One) Time.     • OneTouch Verio test strip Check FBS daily and prn for Diabetes 100 each 5   • pantoprazole (PROTONIX) 40 MG EC tablet Take 1 tablet by mouth Daily.     • Spiriva Respimat 1.25 MCG/ACT aerosol solution inhaler Inhale 2 puffs Daily. 4 g 2       No Known Allergies    Social History     Socioeconomic History   • Marital status:    Tobacco Use   • Smoking status: Some Days     Packs/day: 1.00     Years: 35.00     Pack years: 35.00     Types: Cigarettes     Passive exposure: Past   • Smokeless tobacco: Never   Vaping Use   • Vaping Use: Never used   Substance and Sexual Activity   • Alcohol use: Never   • Drug use: Never   • Sexual activity: Not Currently     Partners: Male     Comment: Menopause       Family History   Problem Relation Age of Onset   • Heart failure Mother    • Diabetes Mother         2000   • Lung cancer Mother    • Heart disease Mother    • Cancer Mother         Lung   • COPD Mother    • Hypertension Mother    • Heart attack Father    • Heart disease Father    • Diabetes Father         2010   • Colon cancer Father    • Cancer  Father         Colon   • Hyperlipidemia Father    • Stroke Father    • Hypertension Father    • Alcohol abuse Sister    • Alcohol abuse Brother    • Drug abuse Brother         1998       Review of Systems   Constitutional: Negative for chills, fever and unexpected weight change.   Respiratory: Negative for shortness of breath.    Cardiovascular: Negative for chest pain.   Gastrointestinal: Negative for abdominal distention, abdominal pain, anal bleeding, blood in stool, constipation, diarrhea, nausea and vomiting.       Objective     Vitals:    03/02/23 1404   BP: 126/78   Pulse: 107   Temp: 97.1 °F (36.2 °C)   SpO2: 96%         03/02/23  1404   Weight: 116 kg (255 lb)     Body mass index is 45.17 kg/m².    Physical Exam  Vitals reviewed.   Constitutional:       General: She is not in acute distress.  Cardiovascular:      Rate and Rhythm: Normal rate and regular rhythm.      Heart sounds: Normal heart sounds.   Pulmonary:      Effort: Pulmonary effort is normal.      Breath sounds: Normal breath sounds.   Abdominal:      General: Bowel sounds are normal. There is no distension.      Palpations: Abdomen is soft.      Tenderness: There is abdominal tenderness (mid upper tenderness ).   Skin:     General: Skin is warm and dry.   Neurological:      Mental Status: She is alert.         Imaging Results (Most Recent)     None          Assessment & Plan     Diagnoses and all orders for this visit:    1. Encounter for screening for malignant neoplasm of colon (Primary)  -     Case Request; Standing  -     Case Request    2. Chronic constipation    3. Epigastric pain  -     Case Request; Standing  -     Case Request    Other orders  -     Implement Anesthesia Orders Day of Procedure; Standing  -     Obtain Informed Consent; Standing      Schedule colonoscopy. Use miralax prep.        In regards to constipation, recommend increase water intake/daily fiber supplement/exercise.  We also discussed trial of miralax and adjust  accordingly.      In regards to viv pain, differential diagnoses discussed.   I recommend egd and she is agreeable. Recommend continue protonix daily.  I recommend emergency room if worsening or severe symptoms.                ESOPHAGOGASTRODUODENOSCOPY WITH ANESTHESIA (N/A), COLONOSCOPY WITH ANESTHESIA (N/A)  All risks, benefits, alternatives, and indications of colonoscopy procedure have been discussed with the patient. Risks to include perforation of the colon requiring possible surgery or colostomy, risk of bleeding from biopsies or removal of colon tissue, possibility of missing a colon polyp or cancer, or adverse drug reaction.  Benefits to include the diagnosis and management of disease of the colon and rectum. Alternatives to include barium enema, radiographic evaluation, lab testing or no intervention. Pt verbalizes understanding and agrees.     Risk, benefits, and alternatives of endoscopy were explained in full.  They understand that there is a risk of bleeding, perforation, and infection.  The risk of perforation goes up with esophageal dilation.  Other options to evaluate UGI complaints could involve barium swallow or UGI series, but these would be diagnostic tests only.  Patient was given time to ask questions.  I answered them to their satisfaction and they are agreeable to proceeding    There are no Patient Instructions on file for this visit.    AMY Steven

## 2023-03-03 PROBLEM — Z12.11 ENCOUNTER FOR SCREENING FOR MALIGNANT NEOPLASM OF COLON: Status: ACTIVE | Noted: 2023-03-03

## 2023-03-03 PROBLEM — R10.13 EPIGASTRIC PAIN: Status: ACTIVE | Noted: 2023-03-03

## 2023-04-03 NOTE — TELEPHONE ENCOUNTER
Pt called inquiring about Pulmonology referral. I called [facility] and was told by Kelle that the patient would not be able to be seen there since Dr. Ugarte did not start her sleep study. Even though the patient does not need a new sleep study he cannot see her. Do you want me to send referral to [facility]?

## 2023-04-07 ENCOUNTER — TELEPHONE (OUTPATIENT)
Dept: FAMILY MEDICINE CLINIC | Facility: CLINIC | Age: 53
End: 2023-04-07
Payer: COMMERCIAL

## 2023-04-07 NOTE — TELEPHONE ENCOUNTER
Stacia with [facility] called and stated that she received a referral on this pt. She wanted to verify if the pt is supposed to see Pulmonology or if she is needing a sleep study. I informed her I would verify with the provider and call her back. Carried voiced an understanding. Call back at [phone], ok to leave VM if no answer

## 2023-04-10 NOTE — TELEPHONE ENCOUNTER
Called and spoke to Stacia. I infomred her that the pt is supposed to see pulmonology. She stated that she would go ahead and fax the referral to pulmonology for us.

## 2023-04-20 ENCOUNTER — TELEPHONE (OUTPATIENT)
Dept: GASTROENTEROLOGY | Facility: CLINIC | Age: 53
End: 2023-04-20
Payer: COMMERCIAL

## 2023-04-20 NOTE — TELEPHONE ENCOUNTER
PT called and rescheduled her procedures that was scheduled for tomorrow.  She rescheduled to 5-30-23.  She picked up her brother from skilled nursing yesterday.  He was very sick and they had to go to ER in [place].  PT wasn't able to start her prep.

## 2023-04-28 ENCOUNTER — OFFICE VISIT (OUTPATIENT)
Dept: FAMILY MEDICINE CLINIC | Facility: CLINIC | Age: 53
End: 2023-04-28
Payer: COMMERCIAL

## 2023-04-28 VITALS
HEIGHT: 63 IN | WEIGHT: 246.6 LBS | OXYGEN SATURATION: 98 % | TEMPERATURE: 98.5 F | DIASTOLIC BLOOD PRESSURE: 89 MMHG | RESPIRATION RATE: 20 BRPM | HEART RATE: 99 BPM | BODY MASS INDEX: 43.7 KG/M2 | SYSTOLIC BLOOD PRESSURE: 157 MMHG

## 2023-04-28 DIAGNOSIS — J20.9 ACUTE BRONCHITIS, UNSPECIFIED ORGANISM: Primary | ICD-10-CM

## 2023-04-28 PROCEDURE — 3044F HG A1C LEVEL LT 7.0%: CPT | Performed by: NURSE PRACTITIONER

## 2023-04-28 PROCEDURE — 1159F MED LIST DOCD IN RCRD: CPT | Performed by: NURSE PRACTITIONER

## 2023-04-28 PROCEDURE — 1160F RVW MEDS BY RX/DR IN RCRD: CPT | Performed by: NURSE PRACTITIONER

## 2023-04-28 PROCEDURE — 99214 OFFICE O/P EST MOD 30 MIN: CPT | Performed by: NURSE PRACTITIONER

## 2023-04-28 PROCEDURE — 3077F SYST BP >= 140 MM HG: CPT | Performed by: NURSE PRACTITIONER

## 2023-04-28 PROCEDURE — 3079F DIAST BP 80-89 MM HG: CPT | Performed by: NURSE PRACTITIONER

## 2023-04-28 RX ORDER — ALBUTEROL SULFATE 90 UG/1
2 AEROSOL, METERED RESPIRATORY (INHALATION) EVERY 4 HOURS PRN
Qty: 18 G | Refills: 2 | Status: SHIPPED | OUTPATIENT
Start: 2023-04-28

## 2023-04-28 RX ORDER — DOXYCYCLINE HYCLATE 100 MG/1
100 CAPSULE ORAL 2 TIMES DAILY
Qty: 14 CAPSULE | Refills: 0 | Status: SHIPPED | OUTPATIENT
Start: 2023-04-28 | End: 2023-05-05

## 2023-04-28 RX ORDER — METHYLPREDNISOLONE 4 MG/1
TABLET ORAL
Qty: 21 TABLET | Refills: 0 | Status: SHIPPED | OUTPATIENT
Start: 2023-04-28

## 2023-04-28 RX ORDER — DEXTROMETHORPHAN HYDROBROMIDE AND PROMETHAZINE HYDROCHLORIDE 15; 6.25 MG/5ML; MG/5ML
5 SYRUP ORAL 4 TIMES DAILY PRN
Qty: 118 ML | Refills: 0 | Status: SHIPPED | OUTPATIENT
Start: 2023-04-28

## 2023-04-28 NOTE — PROGRESS NOTES
"Chief Complaint  Cough (Patient reports that she is coughing. )    Subjective        Anastasiia Rubio presents to [facility].   History of Present Illness  Here for acute visit   Reports she is having a cough that developed from sitting outside  Developed a cold but feels like she has developed bronchitis  Feels like what usually happens when she has this   Productive cough  Reports she gets ill easily with lungs d/t previous lung damage from covid  No other symptoms      Objective   Vital Signs:  /89 (BP Location: Left arm, Patient Position: Sitting, Cuff Size: Large Adult)   Pulse 99   Temp 98.5 °F (36.9 °C) (Infrared)   Resp 20   Ht 160 cm (63\")   Wt 112 kg (246 lb 9.6 oz)   SpO2 98%   BMI 43.68 kg/m²   Estimated body mass index is 43.68 kg/m² as calculated from the following:    Height as of this encounter: 160 cm (63\").    Weight as of this encounter: 112 kg (246 lb 9.6 oz).             Physical Exam  Vitals and nursing note reviewed.   Constitutional:       General: She is not in acute distress.     Appearance: She is well-developed.   HENT:      Right Ear: Tympanic membrane and ear canal normal.      Left Ear: Tympanic membrane and ear canal normal.      Nose: Nose normal.      Right Sinus: No maxillary sinus tenderness or frontal sinus tenderness.      Left Sinus: No maxillary sinus tenderness or frontal sinus tenderness.      Mouth/Throat:      Mouth: Mucous membranes are moist.      Pharynx: Oropharynx is clear. Uvula midline. No uvula swelling.   Eyes:      Conjunctiva/sclera: Conjunctivae normal.   Neck:      Thyroid: No thyromegaly.      Trachea: No tracheal deviation.   Cardiovascular:      Rate and Rhythm: Normal rate and regular rhythm.      Heart sounds: Normal heart sounds.   Pulmonary:      Effort: Pulmonary effort is normal.      Breath sounds: Examination of the right-upper field reveals rhonchi. Examination of the left-upper field reveals rhonchi. " Wheezing and rhonchi present.   Musculoskeletal:      Cervical back: Neck supple.   Lymphadenopathy:      Cervical: No cervical adenopathy.   Skin:     General: Skin is warm and dry.   Neurological:      Mental Status: She is alert.   Psychiatric:         Behavior: Behavior normal.        Result Review :                   Assessment and Plan   Diagnoses and all orders for this visit:    1. Acute bronchitis, unspecified organism (Primary)    Other orders  -     methylPREDNISolone (MEDROL) 4 MG dose pack; Take as directed on package instructions.  Dispense: 21 tablet; Refill: 0  -     doxycycline (VIBRAMYCIN) 100 MG capsule; Take 1 capsule by mouth 2 (Two) Times a Day for 7 days.  Dispense: 14 capsule; Refill: 0  -     albuterol sulfate  (90 Base) MCG/ACT inhaler; Inhale 2 puffs Every 4 (Four) Hours As Needed for Wheezing or Shortness of Air.  Dispense: 18 g; Refill: 2  -     promethazine-dextromethorphan (PROMETHAZINE-DM) 6.25-15 MG/5ML syrup; Take 5 mL by mouth 4 (Four) Times a Day As Needed for Cough.  Dispense: 118 mL; Refill: 0    1. Cough  - Prescribe Medrol Dosepak.  - Prescribed cough medication.   - Refilled albuterol inhaler.  - If symptoms do not improve in the next couple of days, she will return for a chest x-ray.         Follow Up   Return in about 1 week (around 5/5/2023), or if symptoms worsen or fail to improve.  Patient was given instructions and counseling regarding her condition or for health maintenance advice. Please see specific information pulled into the AVS if appropriate.         Transcribed from ambient dictation for AMY Rojas by Lisy Perez.  04/28/23   15:18 CDT    Patient or patient representative verbalized consent to the visit recording.  I have personally performed the services described in this document as transcribed by the above individual, and it is both accurate and complete.

## 2023-05-03 ENCOUNTER — OFFICE VISIT (OUTPATIENT)
Dept: FAMILY MEDICINE CLINIC | Facility: CLINIC | Age: 53
End: 2023-05-03
Payer: COMMERCIAL

## 2023-05-03 VITALS
SYSTOLIC BLOOD PRESSURE: 132 MMHG | RESPIRATION RATE: 20 BRPM | BODY MASS INDEX: 44.01 KG/M2 | HEIGHT: 63 IN | TEMPERATURE: 98.4 F | WEIGHT: 248.4 LBS | OXYGEN SATURATION: 95 % | DIASTOLIC BLOOD PRESSURE: 83 MMHG | HEART RATE: 98 BPM

## 2023-05-03 DIAGNOSIS — R05.1 ACUTE COUGH: ICD-10-CM

## 2023-05-03 DIAGNOSIS — R06.02 SHORTNESS OF BREATH: ICD-10-CM

## 2023-05-03 DIAGNOSIS — N39.3 STRESS INCONTINENCE OF URINE: ICD-10-CM

## 2023-05-03 DIAGNOSIS — N32.81 OVERACTIVE BLADDER: ICD-10-CM

## 2023-05-03 DIAGNOSIS — E11.69 TYPE 2 DIABETES MELLITUS WITH OTHER SPECIFIED COMPLICATION, WITHOUT LONG-TERM CURRENT USE OF INSULIN: Primary | ICD-10-CM

## 2023-05-03 DIAGNOSIS — G89.4 CHRONIC PAIN SYNDROME: ICD-10-CM

## 2023-05-03 PROBLEM — F32.A DEPRESSIVE DISORDER: Status: ACTIVE | Noted: 2020-12-01

## 2023-05-03 PROBLEM — E66.01 MORBID OBESITY WITH BMI OF 40.0-44.9, ADULT: Status: ACTIVE | Noted: 2022-11-01

## 2023-05-03 PROBLEM — Z99.81 SUPPLEMENTAL OXYGEN DEPENDENT: Status: ACTIVE | Noted: 2022-11-01

## 2023-05-03 PROBLEM — E78.5 HYPERLIPIDEMIA: Status: ACTIVE | Noted: 2022-01-12

## 2023-05-03 PROBLEM — R00.0 INAPPROPRIATE SINUS TACHYCARDIA: Status: ACTIVE | Noted: 2022-01-12

## 2023-05-03 PROBLEM — G47.30 SLEEP APNEA: Status: ACTIVE | Noted: 2021-12-06

## 2023-05-03 PROBLEM — I47.11 INAPPROPRIATE SINUS TACHYCARDIA: Status: ACTIVE | Noted: 2022-01-12

## 2023-05-03 PROBLEM — I10 HYPERTENSION: Status: ACTIVE | Noted: 2022-01-12

## 2023-05-03 LAB
BILIRUB BLD-MCNC: NEGATIVE MG/DL
CLARITY, POC: CLEAR
COLOR UR: YELLOW
EXPIRATION DATE: ABNORMAL
GLUCOSE UR STRIP-MCNC: NEGATIVE MG/DL
KETONES UR QL: NEGATIVE
LEUKOCYTE EST, POC: NEGATIVE
Lab: ABNORMAL
NITRITE UR-MCNC: NEGATIVE MG/ML
PH UR: 6 [PH] (ref 5–8)
PROT UR STRIP-MCNC: NEGATIVE MG/DL
RBC # UR STRIP: ABNORMAL /UL
SP GR UR: 1.01 (ref 1–1.03)
UROBILINOGEN UR QL: ABNORMAL

## 2023-05-03 NOTE — PROGRESS NOTES
Chief Complaint  Diabetes (Follow up )    Subjective        Anastasiia Rubio presents to [facility]  History of Present Illness  Presents for follow up for multiple problems.   She is still struggling with cough and congestion that is getting worse and  she can't get over it.  Denies fever, chills, nausea, vomiting or diarrhea, still has some shortness of breath.  Has home oxygen that normally wears at night at 2 L NC however, there have been some days she has had to turn it up to 3 L NC.      Diabetes is controlled, with sugars running in the 105-120, does have some numbness and tingling in feet at times    Has been having problem  with incontinence, says it is getting worse, and she wears a diaper all the time and now she floods it.  She has tried oxybutynin with failure, she has tried pelvic floor exercises thru physical therapy and nothing is working would like to be referred to urology for further work and evaluation .     Wakes up every morning with pain and wants to know if there is a test to see if she has inflammation in the body         Cough  This is a new problem. The current episode started 1 to 4 weeks ago. The problem has been gradually worsening. The problem occurs constantly. The cough is productive of purulent sputum. Associated symptoms include ear congestion, myalgias, nasal congestion, postnasal drip and shortness of breath. Pertinent negatives include no chest pain or sore throat. The symptoms are aggravated by lying down. She has tried OTC cough suppressant and rest for the symptoms. The treatment provided mild relief. Her past medical history is significant for bronchitis.   URI   This is a new problem. The current episode started 1 to 4 weeks ago. The problem has been gradually worsening. There has been no fever. Associated symptoms include congestion, coughing and sinus pain. Pertinent negatives include no chest pain, joint pain, joint swelling, nausea, neck  "pain, sneezing, sore throat or swollen glands. She has tried acetaminophen for the symptoms. The treatment provided mild relief.   Diabetes  She presents for her follow-up diabetic visit. She has type 2 diabetes mellitus. No MedicAlert identification noted. Her disease course has been improving. There are no hypoglycemic associated symptoms. There are no diabetic associated symptoms. Pertinent negatives for diabetes include no chest pain. There are no hypoglycemic complications. Symptoms are stable. There are no diabetic complications. Risk factors for coronary artery disease include diabetes mellitus, dyslipidemia, family history, hypertension, sedentary lifestyle and post-menopausal. Current diabetic treatment includes oral agent (monotherapy). She is compliant with treatment all of the time. She is following a generally healthy diet. Meal planning includes carbohydrate counting. She has not had a previous visit with a dietitian. She participates in exercise intermittently. There is no change in her home blood glucose trend. Her breakfast blood glucose is taken between 7-8 am. Her breakfast blood glucose range is generally  mg/dl. She does not see a podiatrist.Eye exam is not current.   Shortness of Breath  This is a recurrent problem. The current episode started 1 to 4 weeks ago. The problem occurs intermittently. The problem has been gradually worsening. Pertinent negatives include no chest pain, claudication, coryza, leg pain, leg swelling, neck pain, orthopnea, PND, sore throat, sputum production or swollen glands. Nothing aggravates the symptoms. The patient has no known risk factors for DVT/PE. She has tried rest and cool air for the symptoms. The treatment provided mild relief.       Objective   Vital Signs:  /83 (BP Location: Left arm, Patient Position: Sitting, Cuff Size: Adult)   Pulse 98   Temp 98.4 °F (36.9 °C) (Infrared)   Resp 20   Ht 160 cm (63\")   Wt 113 kg (248 lb 6.4 oz)   SpO2 " "95%   BMI 44.00 kg/m²   Estimated body mass index is 44 kg/m² as calculated from the following:    Height as of this encounter: 160 cm (63\").    Weight as of this encounter: 113 kg (248 lb 6.4 oz).       Class 3 Severe Obesity (BMI >=40). Obesity-related health conditions include the following: hypertension, dyslipidemias and GERD. Obesity is worsening. BMI is is above average; BMI management plan is completed. We discussed portion control and increasing exercise.      Physical Exam  Vitals and nursing note reviewed.   Constitutional:       General: She is awake.      Appearance: Normal appearance. She is well-developed and well-groomed. She is morbidly obese.   HENT:      Head: Normocephalic and atraumatic.      Right Ear: Hearing, tympanic membrane, ear canal and external ear normal.      Left Ear: Hearing, tympanic membrane, ear canal and external ear normal.      Nose: Congestion present.      Right Sinus: Maxillary sinus tenderness present.      Left Sinus: Maxillary sinus tenderness present.      Mouth/Throat:      Lips: Pink.      Mouth: Mucous membranes are moist.      Pharynx: Posterior oropharyngeal erythema present.   Eyes:      General: Lids are normal.      Conjunctiva/sclera: Conjunctivae normal.   Cardiovascular:      Rate and Rhythm: Normal rate and regular rhythm.      Heart sounds: Normal heart sounds.   Pulmonary:      Effort: Pulmonary effort is normal.      Breath sounds: Normal breath sounds and air entry.   Musculoskeletal:      Cervical back: Full passive range of motion without pain.      Right lower leg: No edema.      Left lower leg: No edema.   Lymphadenopathy:      Head:      Right side of head: No submental, submandibular or tonsillar adenopathy.      Left side of head: No submental, submandibular or tonsillar adenopathy.   Skin:     General: Skin is warm and dry.   Neurological:      Mental Status: She is alert and oriented to person, place, and time.      Sensory: Sensation is " intact.      Motor: Motor function is intact.      Coordination: Coordination is intact.      Gait: Gait is intact.   Psychiatric:         Attention and Perception: Attention and perception normal.         Mood and Affect: Mood and affect normal.         Speech: Speech normal.         Behavior: Behavior normal. Behavior is cooperative.         Thought Content: Thought content normal.         Cognition and Memory: Cognition and memory normal.         Judgment: Judgment normal.        Result Review :  The following data was reviewed by: Nani Adkins, CHANDU, APRN on 05/03/2023:    Data reviewed: Radiologic studies cxr     Study Result    Narrative & Impression   EXAMINATION: XR CHEST PA AND LATERAL- 5/3/2023 4:32 PM CDT     HISTORY: cough, shortness of breath; R05.1-Acute cough; R06.02-Shortness  of breath     REPORT: Frontal and lateral views of the chest were obtained.     COMPARISON: There are no correlative imaging studies for comparison.     The lungs are clear, normally expanded. Heart size is normal. No  pneumothorax or pleural effusion is identified. The osseous structures  and upper abdomen are unremarkable. Cholecystectomy clips are present.     IMPRESSION:  No acute cardiopulmonary abnormality.  This report was finalized on 05/03/2023 16:32 by Dr. Meng Blackburn MD.         Assessment and Plan   Diagnoses and all orders for this visit:    1. Type 2 diabetes mellitus with other specified complication, without long-term current use of insulin (Primary)  -     Microalbumin / Creatinine Urine Ratio - Urine, Clean Catch  -     Hemoglobin A1c    2. Stress incontinence of urine  -     POC Urinalysis Dipstick, Automated  -     Ambulatory Referral to Urology    3. Overactive bladder  -     POC Urinalysis Dipstick, Automated  -     Ambulatory Referral to Urology    4. Chronic pain syndrome  -     Sedimentation Rate  -     ADAMA  -     Systemic Lupus Profile A    5. Acute cough  -     XR Chest PA & Lateral    6.  Shortness of breath  -     XR Chest PA & Lateral                Follow Up      Return in about 1 week (around 5/10/2023), or if symptoms worsen or fail to improve.     Patient was given instructions and counseling regarding her condition or for health maintenance advice. Please see specific information pulled into the AVS if appropriate.     Electronically signed by Nani Adkins DNP, APRN, 05/12/23, 1:16 PM CDT.

## 2023-05-04 LAB
25(OH)D3+25(OH)D2 SERPL-MCNC: 56.5 NG/ML (ref 30–100)
ALBUMIN SERPL-MCNC: 4.8 G/DL (ref 3.5–5.2)
ALBUMIN/CREAT UR: <10 MG/G CREAT (ref 0–29)
ALBUMIN/GLOB SERPL: 1.8 G/DL
ALP SERPL-CCNC: 120 U/L (ref 39–117)
ALT SERPL-CCNC: 60 U/L (ref 1–33)
ANA SER QL: NEGATIVE
AST SERPL-CCNC: 42 U/L (ref 1–32)
BILIRUB SERPL-MCNC: 0.3 MG/DL (ref 0–1.2)
BUN SERPL-MCNC: 14 MG/DL (ref 6–20)
BUN/CREAT SERPL: 21.5 (ref 7–25)
CALCIUM SERPL-MCNC: 10.4 MG/DL (ref 8.6–10.5)
CHLORIDE SERPL-SCNC: 95 MMOL/L (ref 98–107)
CHROMATIN AB SERPL-ACNC: <0.2 AI (ref 0–0.9)
CO2 SERPL-SCNC: 32.4 MMOL/L (ref 22–29)
CREAT SERPL-MCNC: 0.65 MG/DL (ref 0.57–1)
CREAT UR-MCNC: 28.9 MG/DL
DSDNA AB SER-ACNC: <1 IU/ML (ref 0–9)
EGFRCR SERPLBLD CKD-EPI 2021: 106.7 ML/MIN/1.73
ENA RNP AB SER-ACNC: <0.2 AI (ref 0–0.9)
ENA SM AB SER-ACNC: <0.2 AI (ref 0–0.9)
ENA SS-A AB SER-ACNC: <0.2 AI (ref 0–0.9)
ENA SS-B AB SER-ACNC: <0.2 AI (ref 0–0.9)
ERYTHROCYTE [SEDIMENTATION RATE] IN BLOOD BY WESTERGREN METHOD: 20 MM/HR (ref 0–30)
GLOBULIN SER CALC-MCNC: 2.7 GM/DL
GLUCOSE SERPL-MCNC: 101 MG/DL (ref 65–99)
HBA1C MFR BLD: 6.4 % (ref 4.8–5.6)
MICROALBUMIN UR-MCNC: <3 UG/ML
POTASSIUM SERPL-SCNC: 3.4 MMOL/L (ref 3.5–5.2)
PROT SERPL-MCNC: 7.5 G/DL (ref 6–8.5)
RHEUMATOID FACT SERPL-ACNC: <10 IU/ML
SODIUM SERPL-SCNC: 141 MMOL/L (ref 136–145)

## 2023-05-05 DIAGNOSIS — R74.8 ELEVATED LIVER ENZYMES: Primary | ICD-10-CM

## 2023-05-08 ENCOUNTER — TELEPHONE (OUTPATIENT)
Dept: FAMILY MEDICINE CLINIC | Facility: CLINIC | Age: 53
End: 2023-05-08
Payer: COMMERCIAL

## 2023-05-08 DIAGNOSIS — N30.90 CYSTITIS: Primary | ICD-10-CM

## 2023-05-08 LAB
BACTERIA UR CULT: ABNORMAL
OTHER ANTIBIOTIC SUSC ISLT: ABNORMAL

## 2023-05-08 RX ORDER — LEVOFLOXACIN 500 MG/1
500 TABLET, FILM COATED ORAL DAILY
Qty: 5 TABLET | Refills: 0 | Status: SHIPPED | OUTPATIENT
Start: 2023-05-08 | End: 2023-05-13

## 2023-05-18 ENCOUNTER — TELEPHONE (OUTPATIENT)
Dept: FAMILY MEDICINE CLINIC | Facility: CLINIC | Age: 53
End: 2023-05-18
Payer: COMMERCIAL

## 2023-05-18 NOTE — TELEPHONE ENCOUNTER
Caller: Anastasiia Rubio    Relationship: Self    Best call back number: [phone]    What is the best time to reach you: ANYTIME    Who are you requesting to speak with (clinical staff, provider,  specific staff member): CLINICAL    What was the call regarding: PATIENT HAS A COUPLE QUESTIONS ABOUT HER MEDICATION METFORMIN. PLEASE ADVISE.     Do you require a callback: YES

## 2023-05-19 NOTE — TELEPHONE ENCOUNTER
Called pt to inquire. Pt states that her body hurts so bad and has been for awhile. She states that she forgot to take her Metformin for a few days and her body stopped hurting and she felt amazing. She stated that she decided to take it again and 2 hours later her body started hurting her really bad again. Pt states that she is not sure if the Metformin can do this to her but she feels better when she does not take it. She thinks that the Metformin is causing her body to hurt. She would like to see if there is an alternative medication that she could take for her diabetes.     Informed the pt that I would send a message to Nani to review. Informed her that it would probably not be until Monday until she hears back from us. Pt voiced an understanding.    Pt also states that her brother has been diagnosed with stage 3 colon cancer. She stated that she had blood in her stool and wants to know what she should do.  I informed her that if she continues to have blood in her stool that she needs to go to the ED for evaluation. Pt stated that she would.

## 2023-05-23 NOTE — TELEPHONE ENCOUNTER
Called pt to schedule an appointment to discuss her diabetes medication. Pt stated that she wanted to wait until after her colonoscopy to come in so she can discuss everything at once. Scheduled pt for 6/6/23 at 1:15 with Lisseth

## 2023-05-30 ENCOUNTER — ANESTHESIA (OUTPATIENT)
Dept: GASTROENTEROLOGY | Facility: HOSPITAL | Age: 53
End: 2023-05-30

## 2023-05-30 ENCOUNTER — HOSPITAL ENCOUNTER (OUTPATIENT)
Facility: HOSPITAL | Age: 53
Setting detail: HOSPITAL OUTPATIENT SURGERY
Discharge: HOME | End: 2023-05-30
Attending: INTERNAL MEDICINE | Admitting: INTERNAL MEDICINE

## 2023-05-30 ENCOUNTER — ANESTHESIA EVENT (OUTPATIENT)
Dept: GASTROENTEROLOGY | Facility: HOSPITAL | Age: 53
End: 2023-05-30

## 2023-05-30 VITALS
TEMPERATURE: 97.1 F | SYSTOLIC BLOOD PRESSURE: 126 MMHG | HEIGHT: 63 IN | DIASTOLIC BLOOD PRESSURE: 98 MMHG | HEART RATE: 105 BPM | RESPIRATION RATE: 21 BRPM | BODY MASS INDEX: 43.59 KG/M2 | WEIGHT: 246 LBS | OXYGEN SATURATION: 94 %

## 2023-05-30 DIAGNOSIS — Z12.11 ENCOUNTER FOR SCREENING FOR MALIGNANT NEOPLASM OF COLON: ICD-10-CM

## 2023-05-30 DIAGNOSIS — R10.13 EPIGASTRIC PAIN: ICD-10-CM

## 2023-05-30 LAB — GLUCOSE BLDC GLUCOMTR-MCNC: 141 MG/DL (ref 70–130)

## 2023-05-30 PROCEDURE — 88305 TISSUE EXAM BY PATHOLOGIST: CPT | Performed by: INTERNAL MEDICINE

## 2023-05-30 PROCEDURE — 25010000002 PROPOFOL 10 MG/ML EMULSION: Performed by: NURSE ANESTHETIST, CERTIFIED REGISTERED

## 2023-05-30 PROCEDURE — 87081 CULTURE SCREEN ONLY: CPT | Performed by: INTERNAL MEDICINE

## 2023-05-30 PROCEDURE — 82948 REAGENT STRIP/BLOOD GLUCOSE: CPT

## 2023-05-30 RX ORDER — LIDOCAINE HYDROCHLORIDE 20 MG/ML
INJECTION, SOLUTION EPIDURAL; INFILTRATION; INTRACAUDAL; PERINEURAL AS NEEDED
Status: DISCONTINUED | OUTPATIENT
Start: 2023-05-30 | End: 2023-05-30 | Stop reason: SURG

## 2023-05-30 RX ORDER — SODIUM CHLORIDE 0.9 % (FLUSH) 0.9 %
10 SYRINGE (ML) INJECTION AS NEEDED
Status: DISCONTINUED | OUTPATIENT
Start: 2023-05-30 | End: 2023-05-30 | Stop reason: HOSPADM

## 2023-05-30 RX ORDER — SODIUM CHLORIDE 0.9 % (FLUSH) 0.9 %
10 SYRINGE (ML) INJECTION EVERY 12 HOURS SCHEDULED
Status: CANCELLED | OUTPATIENT
Start: 2023-05-30

## 2023-05-30 RX ORDER — ONDANSETRON 2 MG/ML
4 INJECTION INTRAMUSCULAR; INTRAVENOUS ONCE AS NEEDED
Status: DISCONTINUED | OUTPATIENT
Start: 2023-05-30 | End: 2023-05-30 | Stop reason: HOSPADM

## 2023-05-30 RX ORDER — PROPOFOL 10 MG/ML
VIAL (ML) INTRAVENOUS AS NEEDED
Status: DISCONTINUED | OUTPATIENT
Start: 2023-05-30 | End: 2023-05-30 | Stop reason: SURG

## 2023-05-30 RX ORDER — SODIUM CHLORIDE 9 MG/ML
500 INJECTION, SOLUTION INTRAVENOUS CONTINUOUS PRN
Status: DISCONTINUED | OUTPATIENT
Start: 2023-05-30 | End: 2023-05-30 | Stop reason: HOSPADM

## 2023-05-30 RX ORDER — SODIUM CHLORIDE 9 MG/ML
40 INJECTION, SOLUTION INTRAVENOUS AS NEEDED
Status: CANCELLED | OUTPATIENT
Start: 2023-05-30

## 2023-05-30 RX ORDER — SODIUM CHLORIDE 9 MG/ML
100 INJECTION, SOLUTION INTRAVENOUS CONTINUOUS
Status: CANCELLED | OUTPATIENT
Start: 2023-05-30

## 2023-05-30 RX ORDER — LIDOCAINE HYDROCHLORIDE 10 MG/ML
0.5 INJECTION, SOLUTION EPIDURAL; INFILTRATION; INTRACAUDAL; PERINEURAL ONCE AS NEEDED
Status: CANCELLED | OUTPATIENT
Start: 2023-05-30

## 2023-05-30 RX ORDER — SODIUM CHLORIDE 0.9 % (FLUSH) 0.9 %
10 SYRINGE (ML) INJECTION AS NEEDED
Status: CANCELLED | OUTPATIENT
Start: 2023-05-30

## 2023-05-30 RX ADMIN — SODIUM CHLORIDE 500 ML: 9 INJECTION, SOLUTION INTRAVENOUS at 10:52

## 2023-05-30 RX ADMIN — LIDOCAINE HYDROCHLORIDE 50 MG: 20 INJECTION, SOLUTION EPIDURAL; INFILTRATION; INTRACAUDAL; PERINEURAL at 11:41

## 2023-05-30 RX ADMIN — PROPOFOL INJECTABLE EMULSION 400 MG: 10 INJECTION, EMULSION INTRAVENOUS at 11:41

## 2023-05-30 NOTE — H&P
[facility]  Pre Procedure History & Physical    Chief Complaint:   Screening    Subjective     HPI:   Screening.  She has chronic constipation.  Her father had colon cancer in his 50s.  She presents for first-ever colonoscopy.  She also has some chronic abdominal discomfort.  Located mid gastric area.  This started couple years ago about the same time she started having constipation.  She presents for endoscopy exam as well as colonoscopy    Past Medical History:   Past Medical History:   Diagnosis Date    Asthma 2020    Covid and COPD    COPD (chronic obstructive pulmonary disease)     COVID-19     CPAP (continuous positive airway pressure) dependence     Diabetes mellitus     GERD (gastroesophageal reflux disease) 2020    Hyperlipidemia     Hypertension     Sleep apnea     cpap with o2    Urinary tract infection        Past Surgical History:  Past Surgical History:   Procedure Laterality Date    BREAST AUGMENTATION      LAPAROSCOPIC CHOLECYSTECTOMY      TUBAL ABDOMINAL LIGATION         Family History:  Family History   Problem Relation Age of Onset    Heart failure Mother     Diabetes Mother         2000    Lung cancer Mother     Heart disease Mother     Cancer Mother         Lung    COPD Mother     Hypertension Mother     Heart attack Father     Heart disease Father     Diabetes Father         2010    Colon cancer Father     Cancer Father         Colon    Hyperlipidemia Father     Stroke Father     Hypertension Father     Alcohol abuse Sister     Cancer Brother     Alcohol abuse Brother     Drug abuse Brother         1998       Social History:   reports that she has been smoking cigarettes. She has a 35.00 pack-year smoking history. She has been exposed to tobacco smoke. She has never used smokeless tobacco. She reports that she does not drink alcohol and does not use drugs.    Medications:   Prior to Admission medications    Medication Sig Start Date End Date Taking? Authorizing Provider    albuterol sulfate  (90 Base) MCG/ACT inhaler Inhale 2 puffs Every 4 (Four) Hours As Needed for Wheezing or Shortness of Air. 4/28/23  Yes Rose Yost APRN   chlorthalidone (HYGROTON) 25 MG tablet Take 1 tablet by mouth Daily. 1/6/23  Yes Nani Adkins DNP, APRN   metoprolol succinate XL (TOPROL-XL) 50 MG 24 hr tablet Take 1 tablet by mouth Daily. 2/6/23  Yes Sergey Rios MD   O2 (OXYGEN) Inhale 2 L/min 1 (One) Time.   Yes ProviderNell MD   albuterol (PROVENTIL) (2.5 MG/3ML) 0.083% nebulizer solution USE 1 VIAL IN NEBULIZER EVERY 4 HOURS AS NEEDED 12/22/22   Nell Tsai MD   atorvastatin (LIPITOR) 40 MG tablet Take 1 tablet by mouth Every Night. 1/6/23   Nani Adkins DNP, APRN   Blood Glucose Monitoring Suppl (OneTouch Verio Flex System) w/Device kit Test blood glucose level 1-2 times per day DX:E11.9 2/25/21   Nell Tsai MD   budesonide-formoterol (SYMBICORT) 80-4.5 MCG/ACT inhaler Take  by mouth Every 6 (Six) Hours.    ProviderNell MD   ergocalciferol (ERGOCALCIFEROL) 1.25 MG (82791 UT) capsule Take 1 capsule by mouth 2 (Two) Times a Week. 1/26/23   Nani Adkins DNP, APRN   famotidine (PEPCID) 20 MG tablet Take 1 tablet by mouth 2 (Two) Times a Day. 1/6/23   Nani Adkins DNP, APRN   ibuprofen (ADVIL,MOTRIN) 600 MG tablet Take 1 tablet by mouth Every 6 (Six) Hours As Needed for Mild Pain. 12/20/22   Nani Adkins DNP, APRN   Lancets (OneTouch Delica Plus [id number]) misc 1 each by Other route Daily. 1/6/23   Nani Adkins DNP, APRN   levocetirizine (XYZAL) 5 MG tablet Take 1 tablet by mouth Every Evening.    ProviderNell MD   metFORMIN ER (GLUCOPHAGE-XR) 500 MG 24 hr tablet 2 tablets in the morning and 2 tablets at night 1/24/23   Nani Adkins DNP, APRN   methylPREDNISolone (MEDROL) 4 MG dose pack Take as directed on package instructions. 4/28/23   Rose Yost APRN   OneTouch Verio test  "strip Check FBS daily and prn for Diabetes 1/6/23   Nani Adkins DNP, APRN   oxybutynin XL (DITROPAN-XL) 10 MG 24 hr tablet Take 1 tablet by mouth Daily.    Provider, MD Nell   pantoprazole (PROTONIX) 40 MG EC tablet Take 1 tablet by mouth Daily.    Provider, Nell, MD   promethazine-dextromethorphan (PROMETHAZINE-DM) 6.25-15 MG/5ML syrup Take 5 mL by mouth 4 (Four) Times a Day As Needed for Cough. 4/28/23   Rose Yost APRN   Spiriva Respimat 1.25 MCG/ACT aerosol solution inhaler Inhale 2 puffs Daily. 1/6/23   Nani Adkins DNP, APRN       Allergies:  Patient has no known allergies.    ROS:    General: Weight stable  Resp: No SOA  Cardiovascular: No CP    Objective     Blood pressure 126/69, pulse 100, temperature 97.1 °F (36.2 °C), temperature source Temporal, resp. rate 20, height 160 cm (63\"), weight 112 kg (246 lb), SpO2 92 %, not currently breastfeeding.    Physical Exam   Constitutional: Pt is oriented to person, place, and in no distress.   Cardiovascular: Normal rate, regular rhythm.    Pulmonary/Chest: Effort normal. No respiratory distress.   Abdominal: Non-distended.  Psychiatric: Mood, memory, affect and judgment appear normal.     Assessment & Plan     Diagnosis:  Screening  Family history colon cancer  Chronic epigastric discomfort    Anticipated Surgical Procedure:  Colonoscopy  Endoscopy    The risks, benefits, and alternatives of this procedure have been discussed with the patient or the responsible party- the patient understands and agrees to proceed.    EMR Dragon/transcription disclaimer:  Much of this encounter note is electronic transcription/translation of spoken language to printed text.  The electronic translation of spoken language may be erroneous, or at times, nonsensical words or phrases may be inadvertently transcribed.  Although I have reviewed the note for such errors, some may still exist.  "

## 2023-05-30 NOTE — ANESTHESIA PREPROCEDURE EVALUATION
Anesthesia Evaluation     Patient summary reviewed   no history of anesthetic complications:   NPO Solid Status: > 6 hours             Airway   Mallampati: II  Dental      Pulmonary    (+) a smoker Current, COPD,home oxygen (since covid), sleep apnea on CPAP  Cardiovascular   Exercise tolerance: good (4-7 METS)    (+) hypertension  (-) pacemaker, past MI, cardiac stents, CABG      Neuro/Psych  (-) seizures, CVA  GI/Hepatic/Renal/Endo    (+) morbid obesity, GERD, diabetes mellitus type 2, thyroid problem     Musculoskeletal     Abdominal   (+) obese   Substance History      OB/GYN          Other                        Anesthesia Plan    ASA 3     MAC     (Did not bring home O2--> ok to proceed after discussion )  intravenous induction     Anesthetic plan, risks, benefits, and alternatives have been provided, discussed and informed consent has been obtained with: patient.    CODE STATUS:

## 2023-05-30 NOTE — ANESTHESIA POSTPROCEDURE EVALUATION
Patient: Anastasiia Rubio    Procedure Summary       Date: 05/30/23 Room / Location: [facility] ENDOSCOPY 5 / [facility]    Anesthesia Start: 1137 Anesthesia Stop: 1206    Procedures:       ESOPHAGOGASTRODUODENOSCOPY WITH ANESTHESIA      COLONOSCOPY WITH ANESTHESIA Diagnosis:       Encounter for screening for malignant neoplasm of colon      Epigastric pain      (Encounter for screening for malignant neoplasm of colon [Z12.11])      (Epigastric pain [R10.13])    Surgeons: Christopher Freeman MD Provider: Neno Lopez CRNA    Anesthesia Type: MAC ASA Status: 3            Anesthesia Type: MAC    Vitals  Vitals Value Taken Time   /98 05/30/23 1221   Temp     Pulse 104 05/30/23 1224   Resp 21 05/30/23 1220   SpO2 92 % 05/30/23 1224   Vitals shown include unvalidated device data.        Post Anesthesia Care and Evaluation    Patient location during evaluation: PHASE II  Level of consciousness: awake  Pain management: adequate    Airway patency: patent  Anesthetic complications: No anesthetic complications  PONV Status: none  Cardiovascular status: acceptable  Respiratory status: acceptable  Hydration status: acceptable

## 2023-05-31 LAB — UREASE TISS QL: NEGATIVE

## 2023-05-31 NOTE — PROGRESS NOTES
Subjective    Ms. Rubio is 53 y.o. female    Chief Complaint: urinary incontinence    History of Present Illness    53-year-old female new patient referred for worsening urinary incontinence.  Patient reports onset of symptoms over the past 2 to 3 years for which patient states she is now going through approximately 4 packs of depends monthly.  Has a history of 4 vaginal deliveries.  Denies any external bulge.  Reports full urine incontinent episodes with coughing, laughing, sneezing or position change.  Reports at times that she can be walking and not even have any notification and just start urinating.  Has tried pelvic floor physical therapy as well as Kegel exercises without any improvement in symptoms.  Was placed on oxybutynin without improvement in symptoms.    The following portions of the patient's history were reviewed and updated as appropriate: allergies, current medications, past family history, past medical history, past social history, past surgical history and problem list.    Review of Systems   Constitutional:  Negative for chills, fatigue and fever.   Gastrointestinal:  Negative for nausea and vomiting.   Genitourinary:  Negative for decreased urine volume, difficulty urinating, dysuria, flank pain, frequency, hematuria, pelvic pain, urgency and vaginal pain.       Current Outpatient Medications:     albuterol (PROVENTIL) (2.5 MG/3ML) 0.083% nebulizer solution, USE 1 VIAL IN NEBULIZER EVERY 4 HOURS AS NEEDED, Disp: , Rfl:     albuterol sulfate  (90 Base) MCG/ACT inhaler, Inhale 2 puffs Every 4 (Four) Hours As Needed for Wheezing or Shortness of Air., Disp: 18 g, Rfl: 2    atorvastatin (LIPITOR) 40 MG tablet, Take 1 tablet by mouth Every Night., Disp: 90 tablet, Rfl: 0    Blood Glucose Monitoring Suppl (OneTouch Verio Flex System) w/Device kit, Test blood glucose level 1-2 times per day DX:E11.9, Disp: , Rfl:     budesonide-formoterol (SYMBICORT) 80-4.5 MCG/ACT inhaler, Take  by mouth Every  6 (Six) Hours., Disp: , Rfl:     chlorthalidone (HYGROTON) 25 MG tablet, Take 1 tablet by mouth Daily., Disp: 90 tablet, Rfl: 1    ergocalciferol (ERGOCALCIFEROL) 1.25 MG (43889 UT) capsule, Take 1 capsule by mouth 2 (Two) Times a Week., Disp: 12 capsule, Rfl: 5    famotidine (PEPCID) 20 MG tablet, Take 1 tablet by mouth 2 (Two) Times a Day., Disp: 180 tablet, Rfl: 0    ibuprofen (ADVIL,MOTRIN) 600 MG tablet, Take 1 tablet by mouth Every 6 (Six) Hours As Needed for Mild Pain., Disp: 90 tablet, Rfl: 0    Lancets (OneTouch Delica Plus [id number]) misc, 1 each by Other route Daily., Disp: 100 each, Rfl: 5    levocetirizine (XYZAL) 5 MG tablet, Take 1 tablet by mouth Every Evening., Disp: , Rfl:     metFORMIN ER (GLUCOPHAGE-XR) 500 MG 24 hr tablet, 2 tablets in the morning and 2 tablets at night, Disp: 120 tablet, Rfl: 5    methylPREDNISolone (MEDROL) 4 MG dose pack, Take as directed on package instructions., Disp: 21 tablet, Rfl: 0    metoprolol succinate XL (TOPROL-XL) 50 MG 24 hr tablet, Take 1 tablet by mouth Daily., Disp: 90 tablet, Rfl: 3    O2 (OXYGEN), Inhale 2 L/min 1 (One) Time., Disp: , Rfl:     OneTouch Verio test strip, Check FBS daily and prn for Diabetes, Disp: 100 each, Rfl: 5    pantoprazole (PROTONIX) 40 MG EC tablet, Take 1 tablet by mouth Daily., Disp: , Rfl:     promethazine-dextromethorphan (PROMETHAZINE-DM) 6.25-15 MG/5ML syrup, Take 5 mL by mouth 4 (Four) Times a Day As Needed for Cough., Disp: 118 mL, Rfl: 0    Spiriva Respimat 1.25 MCG/ACT aerosol solution inhaler, Inhale 2 puffs Daily., Disp: 4 g, Rfl: 2    oxybutynin XL (DITROPAN-XL) 10 MG 24 hr tablet, Take 1 tablet by mouth Daily. (Patient not taking: Reported on 6/1/2023), Disp: , Rfl:     Past Medical History:   Diagnosis Date    Asthma 2020    Covid and COPD    COPD (chronic obstructive pulmonary disease)     COVID-19     CPAP (continuous positive airway pressure) dependence     Diabetes mellitus     GERD (gastroesophageal reflux disease)  "2020    Hyperlipidemia     Hypertension     Sleep apnea     cpap with o2    Urinary tract infection        Past Surgical History:   Procedure Laterality Date    BREAST AUGMENTATION      COLONOSCOPY N/A 5/30/2023    Procedure: COLONOSCOPY WITH ANESTHESIA;  Surgeon: Christopher Freeman MD;  Location: [facility] ENDOSCOPY;  Service: Gastroenterology;  Laterality: N/A;  preop; epigastric pain   postop polyp  PCP Nani denis    ENDOSCOPY N/A 5/30/2023    Procedure: ESOPHAGOGASTRODUODENOSCOPY WITH ANESTHESIA;  Surgeon: Christopher Freeman MD;  Location: [facility] ENDOSCOPY;  Service: Gastroenterology;  Laterality: N/A;  preop; epigastric pain  postop normal  PCP Nani denis    LAPAROSCOPIC CHOLECYSTECTOMY      TUBAL ABDOMINAL LIGATION         Social History     Socioeconomic History    Marital status:    Tobacco Use    Smoking status: Every Day     Packs/day: 1.00     Years: 35.00     Pack years: 35.00     Types: Cigarettes     Passive exposure: Past    Smokeless tobacco: Never   Vaping Use    Vaping Use: Never used   Substance and Sexual Activity    Alcohol use: Never    Drug use: Never    Sexual activity: Defer     Comment: Menopause       Family History   Problem Relation Age of Onset    Heart failure Mother     Diabetes Mother         2000    Lung cancer Mother     Heart disease Mother     Cancer Mother         Lung    COPD Mother     Hypertension Mother     Heart attack Father     Heart disease Father     Diabetes Father         2010    Colon cancer Father     Cancer Father         Colon    Hyperlipidemia Father     Stroke Father     Hypertension Father     Alcohol abuse Sister     Cancer Brother     Alcohol abuse Brother     Drug abuse Brother         1998       Objective    Temp 98.3 °F (36.8 °C)   Ht 160 cm (63\")   Wt 113 kg (249 lb)   LMP  (LMP Unknown) Comment: lmp January  BMI 44.11 kg/m²     Physical Exam  Nursing note reviewed.   Constitutional:       General: She is not in acute distress.     " Appearance: Normal appearance. She is not ill-appearing.   HENT:      Nose: No congestion.   Abdominal:      Tenderness: There is no right CVA tenderness or left CVA tenderness.      Hernia: No hernia is present.   Skin:     General: Skin is warm and dry.   Neurological:      Mental Status: She is alert and oriented to person, place, and time.   Psychiatric:         Mood and Affect: Mood normal.         Behavior: Behavior normal.           Results for orders placed or performed in visit on 06/01/23   POC Urinalysis Dipstick, Multipro    Specimen: Urine   Result Value Ref Range    Color Yellow Yellow, Straw, Dark Yellow, Estefani    Clarity, UA Clear Clear    Glucose, UA Negative Negative mg/dL    Bilirubin Negative Negative    Ketones, UA Negative Negative    Specific Gravity  1.010 1.005 - 1.030    Blood, UA Negative Negative    pH, Urine 5.5 5.0 - 8.0    Protein, POC Negative Negative mg/dL    Urobilinogen, UA 0.2 E.U./dL Normal, 0.2 E.U./dL    Nitrite, UA Negative Negative    Leukocytes Negative Negative   Estimation of residual urine via abdominal ultrasound  Residual Urine: 113 ml  Indication: urinary incontinence  Position: Supine  Examination: Incremental scanning of the suprapubic area using 3 MHz transducer using copious amounts of acoustic gel.   Findings: An anechoic area was demonstrated which represented the bladder, with measurement of residual urine as noted. I inspected this myself. In that the residual urine was stable or insignificant, no treatment will be necessary at this time.      Assessment and Plan    Diagnoses and all orders for this visit:    1. Stress incontinence (female) (male) (Primary)  -     POC Urinalysis Dipstick, Multipro      C/O worsening stress incontinence    Previously tried and failed Kegels and pelvic floor physical therapy    Previously tried and failed oxybutynin    After lengthy discussion with patient regarding possible Bulkamid injection procedure patient appears very  interested and would like a consultation    We will get patient scheduled with Dr. Wall for consultation

## 2023-06-01 ENCOUNTER — OFFICE VISIT (OUTPATIENT)
Dept: UROLOGY | Facility: CLINIC | Age: 53
End: 2023-06-01

## 2023-06-01 VITALS — WEIGHT: 249 LBS | TEMPERATURE: 98.3 F | HEIGHT: 63 IN | BODY MASS INDEX: 44.12 KG/M2

## 2023-06-01 DIAGNOSIS — N39.3 STRESS INCONTINENCE (FEMALE) (MALE): Primary | ICD-10-CM

## 2023-06-01 LAB
BILIRUB BLD-MCNC: NEGATIVE MG/DL
CLARITY, POC: CLEAR
COLOR UR: YELLOW
CYTO UR: NORMAL
GLUCOSE UR STRIP-MCNC: NEGATIVE MG/DL
KETONES UR QL: NEGATIVE
LAB AP CASE REPORT: NORMAL
LEUKOCYTE EST, POC: NEGATIVE
Lab: NORMAL
NITRITE UR-MCNC: NEGATIVE MG/ML
PATH REPORT.FINAL DX SPEC: NORMAL
PATH REPORT.GROSS SPEC: NORMAL
PH UR: 5.5 [PH] (ref 5–8)
PROT UR STRIP-MCNC: NEGATIVE MG/DL
RBC # UR STRIP: NEGATIVE /UL
SP GR UR: 1.01 (ref 1–1.03)
UROBILINOGEN UR QL: NORMAL

## 2023-06-06 ENCOUNTER — OFFICE VISIT (OUTPATIENT)
Dept: FAMILY MEDICINE CLINIC | Facility: CLINIC | Age: 53
End: 2023-06-06
Payer: COMMERCIAL

## 2023-06-06 VITALS
BODY MASS INDEX: 43.94 KG/M2 | WEIGHT: 248 LBS | TEMPERATURE: 98.6 F | RESPIRATION RATE: 18 BRPM | HEIGHT: 63 IN | HEART RATE: 88 BPM | SYSTOLIC BLOOD PRESSURE: 124 MMHG | DIASTOLIC BLOOD PRESSURE: 84 MMHG | OXYGEN SATURATION: 95 %

## 2023-06-06 DIAGNOSIS — G89.29 OTHER CHRONIC PAIN: ICD-10-CM

## 2023-06-06 DIAGNOSIS — I10 PRIMARY HYPERTENSION: ICD-10-CM

## 2023-06-06 DIAGNOSIS — E11.65 TYPE 2 DIABETES MELLITUS WITH HYPERGLYCEMIA, WITHOUT LONG-TERM CURRENT USE OF INSULIN: Primary | ICD-10-CM

## 2023-06-06 DIAGNOSIS — E66.01 MORBID (SEVERE) OBESITY DUE TO EXCESS CALORIES: ICD-10-CM

## 2023-06-06 RX ORDER — FAMOTIDINE 20 MG/1
20 TABLET, FILM COATED ORAL 2 TIMES DAILY
Qty: 180 TABLET | Refills: 0 | Status: SHIPPED | OUTPATIENT
Start: 2023-06-06

## 2023-06-06 NOTE — PROGRESS NOTES
Chief Complaint  Diabetes (Patient reports feeling achy with metformin )    Subjective        Anastasiia Rubio presents to [facility]  History of Present Illness  Has been struggling with chronic pain all over.  She was out of town in [place] and forgot her meds .  She started feeling better.  When she came hokme she started taking the metformen again and started feeling bad again.  She feels that she is less active when taking metformen.    She does not want to take it anymore.  She is having chronic joint pain, arms, shoulders, hips, knees, ankles and wrists   Diabetes  She presents for her follow-up diabetic visit. She has type 2 diabetes mellitus. No MedicAlert identification noted. The initial diagnosis of diabetes was made 2020 Years ago. Her disease course has been stable. Hypoglycemia symptoms include dizziness and sweats. Pertinent negatives for hypoglycemia include no confusion, headaches, hunger, mood changes, nervousness/anxiousness, pallor, seizures, sleepiness, speech difficulty or tremors. Associated symptoms include foot paresthesias. Pertinent negatives for diabetes include no blurred vision, no chest pain, no fatigue, no foot ulcerations, no polydipsia, no polyphagia, no polyuria, no visual change, no weakness and no weight loss. Pertinent negatives for hypoglycemia complications include no blackouts, no hospitalization, no nocturnal hypoglycemia, no required assistance and no required glucagon injection. Symptoms are stable. Pertinent negatives for diabetic complications include no CVA, heart disease, impotence, nephropathy, peripheral neuropathy, PVD or retinopathy. Risk factors for coronary artery disease include dyslipidemia, family history, hypertension, obesity and tobacco exposure. Current diabetic treatment includes oral agent (monotherapy). She is compliant with treatment all of the time. Her weight is fluctuating minimally. She is following a generally  unhealthy diet. Meal planning includes avoidance of concentrated sweets. She has not had a previous visit with a dietitian. She participates in exercise every other day. She monitors blood glucose at home 1-2 x per week. Blood glucose monitoring compliance is excellent. Her home blood glucose trend is fluctuating minimally. Her breakfast blood glucose is taken between 7-8 am. Her breakfast blood glucose range is generally 110-130 mg/dl. Her lunch blood glucose is taken between 2-3 pm. Her lunch blood glucose range is generally 140-180 mg/dl. Her dinner blood glucose is taken after 8 pm. Her dinner blood glucose range is generally 140-180 mg/dl. Her overall blood glucose range is 140-180 mg/dl. She does not see a podiatrist.Eye exam is not current.   Hypertension  This is a chronic problem. The current episode started more than 1 year ago. The problem has been gradually improving since onset. The problem is controlled. Associated symptoms include sweats. Pertinent negatives include no blurred vision, chest pain, headaches or neck pain. There are no associated agents to hypertension. Risk factors for coronary artery disease include diabetes mellitus, dyslipidemia, family history, sedentary lifestyle, obesity and post-menopausal state. Past treatments include beta blockers. Current antihypertension treatment includes beta blockers. The current treatment provides mild improvement. There are no compliance problems.  There is no history of CVA, PVD or retinopathy. There is no history of coarctation of the aorta or pheochromocytoma.   Obesity  This is a chronic problem. The current episode started more than 1 year ago. The problem occurs constantly. The problem has been gradually worsening. Associated symptoms include arthralgias and myalgias. Pertinent negatives include no change in bowel habit, chest pain, chills, congestion, coughing, fatigue, headaches, joint swelling, nausea, neck pain, sore throat, swollen glands,  "urinary symptoms, visual change or weakness. Nothing aggravates the symptoms. She has tried nothing for the symptoms. The treatment provided no relief.   Pain  This is a chronic problem. The current episode started more than 1 year ago. The problem occurs constantly. The problem has been gradually worsening. Associated symptoms include arthralgias and myalgias. Pertinent negatives include no change in bowel habit, chest pain, chills, congestion, coughing, fatigue, headaches, joint swelling, nausea, neck pain, sore throat, swollen glands, urinary symptoms, visual change or weakness. The symptoms are aggravated by bending, twisting, walking, standing and exertion. She has tried rest and NSAIDs for the symptoms. The treatment provided mild relief.     The following portions of the patient's history were reviewed and updated as appropriate: allergies, current medications, past family history, past medical history, past social history, past surgical history and problem list.     Objective   Vital Signs:  /84 (BP Location: Right arm, Patient Position: Sitting, Cuff Size: Large Adult)   Pulse 88   Temp 98.6 °F (37 °C) (Infrared)   Resp 18   Ht 160 cm (63\") Comment: per patient  Wt 112 kg (248 lb)   SpO2 95%   BMI 43.93 kg/m²   Estimated body mass index is 43.93 kg/m² as calculated from the following:    Height as of this encounter: 160 cm (63\").    Weight as of this encounter: 112 kg (248 lb).            Physical Exam  Vitals and nursing note reviewed.   Constitutional:       General: She is awake.      Appearance: Normal appearance. She is well-developed and well-groomed.   HENT:      Head: Normocephalic and atraumatic.      Right Ear: Hearing, tympanic membrane, ear canal and external ear normal.      Left Ear: Hearing, tympanic membrane, ear canal and external ear normal.      Nose: Nose normal.      Mouth/Throat:      Lips: Pink.      Pharynx: Oropharynx is clear.   Eyes:      General: Lids are normal.     "  Conjunctiva/sclera: Conjunctivae normal.   Cardiovascular:      Rate and Rhythm: Normal rate and regular rhythm.      Heart sounds: Normal heart sounds.   Pulmonary:      Effort: Pulmonary effort is normal.      Breath sounds: Normal breath sounds and air entry.   Musculoskeletal:      Right shoulder: Tenderness present. Decreased range of motion.      Left shoulder: Decreased range of motion.      Right elbow: Decreased range of motion.      Left elbow: Decreased range of motion.      Right wrist: Tenderness present. Decreased range of motion.      Left wrist: Tenderness present. Decreased range of motion.        Arms:       Cervical back: Normal and full passive range of motion without pain.      Thoracic back: Normal.      Lumbar back: Spasms and tenderness present.        Back:       Right lower leg: Tenderness present. No edema.      Left lower leg: Tenderness present. No edema.        Legs:    Lymphadenopathy:      Head:      Right side of head: No submental, submandibular or tonsillar adenopathy.      Left side of head: No submental, submandibular or tonsillar adenopathy.   Skin:     General: Skin is warm and dry.   Neurological:      Mental Status: She is alert and oriented to person, place, and time.      Sensory: Sensation is intact.      Motor: Motor function is intact.      Coordination: Coordination is intact.      Gait: Gait is intact.   Psychiatric:         Attention and Perception: Attention and perception normal.         Mood and Affect: Mood and affect normal.         Speech: Speech normal.         Behavior: Behavior normal. Behavior is cooperative.         Thought Content: Thought content normal.         Cognition and Memory: Cognition and memory normal.         Judgment: Judgment normal.      Result Review :                   Assessment and Plan   Diagnoses and all orders for this visit:    1. Type 2 diabetes mellitus with hyperglycemia, without long-term current use of insulin (Primary)  -      Semaglutide,0.25 or 0.5MG/DOS, (OZEMPIC) 2 MG/3ML solution pen-injector; Inject 0.25 mg under the skin into the appropriate area as directed 1 (One) Time Per Week for 28 days, THEN 0.5 mg 1 (One) Time Per Week for 28 days.  Dispense: 3 mL; Refill: 0    2. Other chronic pain  -     Ambulatory Referral to Physical Therapy Evaluate and treat    3. Morbid (severe) obesity due to excess calories  Obesity Plan:    The patient BMI is outside this range and we recommended/discussed today to utilize a diet/exercise program to get back into the appropriate range.  Federal guidelines recommend that people under the age of 65 should have a BMI of 18.5-24.9  The initial step is to document everything that is consumed into a food diary. Studies have shown that patients can lose up to 2x the weight by keeping track of foods.   Choose one bad food weekly and eliminate it from your diet.  Replace with one healthy food  Goal over next 2-4 weeks is walk 30 minutes per day 5 days per week at pace difficult to hold conversation.   Drink more water, less soda.   Cut back on portion sizes.   Today we encouraged roughly a 1 lb per week weight loss with initial goal of 5% weight loss.  Discussed if eating out for a meal, consider cutting food in half and placing into a to go container.  Individually portion any foods coming into the home based on package.  Use smaller plates  Drinking 12 oz of water 30 minutes before meal as way to suppress appetite.  Medications discussed today include metformin, topamax, phentermine, Qsymia, Belviq (lorcaserin), Contrave (Buproprion/naltrexone).    Nutritional counseling and supervised diet visit monthly x 6 months  Lifestyle therapy   Simple advice to lose weight   Internet programs or self help books.    Advice from dietitian  Set Goals that are realistic   Encouraged to use visual aides to help in measuring foods  Baseball-1 cup good for green salad, frozen yogurt, medium piece of fruit, baked  potato  Handful - ½ cup good cut fruit, cooked vegetables, pasta, rice  Egg- ¼ cup good for dried fruit  Deck of cards-3 ounces good for meat and poultry  Check book-3 ounces good for grilled fish  6 dice side by side- 1 ½ ounces good for natural cheese  Simple tips   Plate method-reduce plate size to 9 inch dinner plate.  Half of plate should be filled with non-starchy vegetables (broccoli, lettuce, cauliflower, tomatoes), ¼ plate with lean source of protein (lean chicken, turkey, fish), remaining ½ with whole grains (brown rice, potato, whole grain breads  Avoid liquid calories (regular soda, juice, coffee with cream)  Focus  on water, seltzer water and other non-calorie drinks  Replace regular sugar with non-caloric sweeteners  Avoid skipping meals: plan small regular meals throughout the day in order to keep your hunger controlled  Consider using meal replacements if unable to plan a healthy meal (protein shake, high protein bar)  Replace all white bread with whole wheat/whole grain alternative  Swap regular salad dressings (mayonnaise, butter, or low fat or fat free alternative)  Avoid high fat, high calorie, high carbohydrate snakes (cookies, pastries, cakes)  Snack on fruits, low fat dairy (yogurt, cottage cheese)    Behavioral Modification  Self-Monitoring of dietary Intake-keep a dietary intake log. Obese individuals have been shown to underestimate their food intake  Behavioral treatment -gives exacts about amount and total calories  Read food labels          4. Primary hypertension    Other orders  -     famotidine (PEPCID) 20 MG tablet; Take 1 tablet by mouth 2 (Two) Times a Day.  Dispense: 180 tablet; Refill: 0           6  Follow Up   Return in about 1 month (around 7/6/2023).  Patient was given instructions and counseling regarding her condition or for health maintenance advice. Please see specific information pulled into the AVS if appropriate.       Electronically signed by Nani Adkins DNP,  AMY, 06/06/23, 2:43 PM CDT.  Answers submitted by the patient for this visit:  Primary Reason for Visit (Submitted on 6/6/2023)  What is the primary reason for your visit?: Diabetes

## 2023-06-07 ENCOUNTER — TELEPHONE (OUTPATIENT)
Dept: FAMILY MEDICINE CLINIC | Facility: CLINIC | Age: 53
End: 2023-06-07
Payer: COMMERCIAL

## 2023-06-08 ENCOUNTER — PROCEDURE VISIT (OUTPATIENT)
Dept: UROLOGY | Facility: CLINIC | Age: 53
End: 2023-06-08
Payer: COMMERCIAL

## 2023-06-08 ENCOUNTER — TELEPHONE (OUTPATIENT)
Dept: UROLOGY | Facility: CLINIC | Age: 53
End: 2023-06-08
Payer: COMMERCIAL

## 2023-06-08 ENCOUNTER — OFFICE VISIT (OUTPATIENT)
Dept: PULMONOLOGY | Age: 53
End: 2023-06-08
Payer: MEDICAID

## 2023-06-08 VITALS
OXYGEN SATURATION: 94 % | HEIGHT: 63 IN | SYSTOLIC BLOOD PRESSURE: 126 MMHG | DIASTOLIC BLOOD PRESSURE: 78 MMHG | WEIGHT: 250.2 LBS | TEMPERATURE: 97.5 F | HEART RATE: 82 BPM | BODY MASS INDEX: 44.33 KG/M2

## 2023-06-08 DIAGNOSIS — E66.01 MORBID OBESITY (HCC): ICD-10-CM

## 2023-06-08 DIAGNOSIS — R06.2 WHEEZING: ICD-10-CM

## 2023-06-08 DIAGNOSIS — Z86.16 HISTORY OF COVID-19: ICD-10-CM

## 2023-06-08 DIAGNOSIS — J45.40 MODERATE PERSISTENT REACTIVE AIRWAY DISEASE WITHOUT COMPLICATION: ICD-10-CM

## 2023-06-08 DIAGNOSIS — F17.210 CIGARETTE NICOTINE DEPENDENCE WITHOUT COMPLICATION: ICD-10-CM

## 2023-06-08 DIAGNOSIS — E66.01 MORBID OBESITY WITH BMI OF 40.0-44.9, ADULT: ICD-10-CM

## 2023-06-08 DIAGNOSIS — G47.34 SLEEP RELATED HYPOXIA: ICD-10-CM

## 2023-06-08 DIAGNOSIS — R06.09 DOE (DYSPNEA ON EXERTION): ICD-10-CM

## 2023-06-08 DIAGNOSIS — G47.33 OSA (OBSTRUCTIVE SLEEP APNEA): Primary | ICD-10-CM

## 2023-06-08 DIAGNOSIS — N39.3 STRESS INCONTINENCE (FEMALE) (MALE): Primary | ICD-10-CM

## 2023-06-08 PROBLEM — J45.909 REACTIVE AIRWAY DISEASE: Status: ACTIVE | Noted: 2023-06-08

## 2023-06-08 LAB
BASOPHILS # BLD: 0.1 K/UL (ref 0–0.2)
BASOPHILS NFR BLD: 0.5 % (ref 0–1)
BILIRUB BLD-MCNC: NEGATIVE MG/DL
CLARITY, POC: CLEAR
COLOR UR: YELLOW
DISTANCE WALKED: 200 FT
EOSINOPHIL # BLD: 0.1 K/UL (ref 0–0.6)
EOSINOPHIL NFR BLD: 0.8 % (ref 0–5)
ERYTHROCYTE [DISTWIDTH] IN BLOOD BY AUTOMATED COUNT: 12.8 % (ref 11.5–14.5)
GLUCOSE UR STRIP-MCNC: NEGATIVE MG/DL
HCT VFR BLD AUTO: 49.6 % (ref 37–47)
HGB BLD-MCNC: 16.9 G/DL (ref 12–16)
IGG SERPL-MCNC: 843 MG/DL (ref 700–1600)
IGM SERPL-MCNC: 222 MG/DL (ref 40–230)
IMM GRANULOCYTES # BLD: 0 K/UL
KETONES UR QL: NEGATIVE
LEUKOCYTE EST, POC: NEGATIVE
LYMPHOCYTES # BLD: 4.1 K/UL (ref 1.1–4.5)
LYMPHOCYTES NFR BLD: 30.8 % (ref 20–40)
MCH RBC QN AUTO: 31.4 PG (ref 27–31)
MCHC RBC AUTO-ENTMCNC: 34.1 G/DL (ref 33–37)
MCV RBC AUTO: 92.2 FL (ref 81–99)
MONOCYTES # BLD: 0.6 K/UL (ref 0–0.9)
MONOCYTES NFR BLD: 4.7 % (ref 0–10)
NEUTROPHILS # BLD: 8.3 K/UL (ref 1.5–7.5)
NEUTS SEG NFR BLD: 62.9 % (ref 50–65)
NITRITE UR-MCNC: NEGATIVE MG/ML
PH UR: 8.5 [PH] (ref 5–8)
PLATELET # BLD AUTO: 196 K/UL (ref 130–400)
PMV BLD AUTO: 11.3 FL (ref 9.4–12.3)
PROT UR STRIP-MCNC: NEGATIVE MG/DL
RBC # BLD AUTO: 5.38 M/UL (ref 4.2–5.4)
RBC # UR STRIP: ABNORMAL /UL
SP GR UR: 1.02 (ref 1–1.03)
SPO2: 88 %
UROBILINOGEN UR QL: ABNORMAL
WBC # BLD AUTO: 13.3 K/UL (ref 4.8–10.8)

## 2023-06-08 PROCEDURE — 94618 PULMONARY STRESS TESTING: CPT | Performed by: INTERNAL MEDICINE

## 2023-06-08 PROCEDURE — 99406 BEHAV CHNG SMOKING 3-10 MIN: CPT | Performed by: INTERNAL MEDICINE

## 2023-06-08 PROCEDURE — 99204 OFFICE O/P NEW MOD 45 MIN: CPT | Performed by: INTERNAL MEDICINE

## 2023-06-08 RX ORDER — LEVOFLOXACIN 500 MG/1
500 TABLET, FILM COATED ORAL DAILY
COMMUNITY

## 2023-06-08 RX ORDER — OXYBUTYNIN CHLORIDE 10 MG/1
10 TABLET, EXTENDED RELEASE ORAL DAILY
COMMUNITY

## 2023-06-08 RX ORDER — LEVOCETIRIZINE DIHYDROCHLORIDE 5 MG/1
5 TABLET, FILM COATED ORAL NIGHTLY
COMMUNITY

## 2023-06-08 RX ORDER — ALBUTEROL SULFATE 90 UG/1
2 AEROSOL, METERED RESPIRATORY (INHALATION) EVERY 6 HOURS PRN
COMMUNITY

## 2023-06-08 RX ORDER — BUDESONIDE AND FORMOTEROL FUMARATE DIHYDRATE 80; 4.5 UG/1; UG/1
2 AEROSOL RESPIRATORY (INHALATION) 2 TIMES DAILY
COMMUNITY

## 2023-06-08 RX ORDER — PANTOPRAZOLE SODIUM 40 MG/1
40 TABLET, DELAYED RELEASE ORAL DAILY
COMMUNITY

## 2023-06-08 RX ORDER — ATORVASTATIN CALCIUM 40 MG/1
40 TABLET, FILM COATED ORAL DAILY
COMMUNITY

## 2023-06-08 RX ORDER — FAMOTIDINE 20 MG/1
20 TABLET, FILM COATED ORAL 2 TIMES DAILY
COMMUNITY

## 2023-06-08 RX ORDER — CHLORTHALIDONE 25 MG/1
25 TABLET ORAL DAILY
COMMUNITY

## 2023-06-08 RX ORDER — ERGOCALCIFEROL 1.25 MG/1
50000 CAPSULE ORAL WEEKLY
COMMUNITY

## 2023-06-08 RX ORDER — METOPROLOL SUCCINATE 50 MG/1
50 TABLET, EXTENDED RELEASE ORAL DAILY
COMMUNITY

## 2023-06-08 ASSESSMENT — ENCOUNTER SYMPTOMS
ANAL BLEEDING: 0
BACK PAIN: 0
SHORTNESS OF BREATH: 1
CHEST TIGHTNESS: 0
WHEEZING: 1
APNEA: 0
ABDOMINAL DISTENTION: 0
ABDOMINAL PAIN: 0
RHINORRHEA: 0
COUGH: 1

## 2023-06-08 NOTE — PROGRESS NOTES
Chief Complaint  Incontinence    Subjective          Anastasiia Rubio presents to [facility] UROLOG[facility] [place]   Patient has incontinence.  She says has been having this now for about 2 years.  Her predominant symptom is leakage with cough sneezing, bending over to  things, and even walking.  She does admit to some episodes of urge incontinence where she is simply had unaware loss of urine to the point she emptied her bladder.  She said this only happened a couple times.  The last time was about 2 years ago.  She did not respond to oxybutynin.  She also failed physical therapy pelvic floor rehab.      Current Outpatient Medications:     albuterol (PROVENTIL) (2.5 MG/3ML) 0.083% nebulizer solution, USE 1 VIAL IN NEBULIZER EVERY 4 HOURS AS NEEDED, Disp: , Rfl:     albuterol sulfate  (90 Base) MCG/ACT inhaler, Inhale 2 puffs Every 4 (Four) Hours As Needed for Wheezing or Shortness of Air., Disp: 18 g, Rfl: 2    atorvastatin (LIPITOR) 40 MG tablet, Take 1 tablet by mouth Every Night., Disp: 90 tablet, Rfl: 0    Blood Glucose Monitoring Suppl (OneTouch Verio Flex System) w/Device kit, Test blood glucose level 1-2 times per day DX:E11.9, Disp: , Rfl:     budesonide-formoterol (SYMBICORT) 80-4.5 MCG/ACT inhaler, Take  by mouth Every 6 (Six) Hours., Disp: , Rfl:     chlorthalidone (HYGROTON) 25 MG tablet, Take 1 tablet by mouth Daily., Disp: 90 tablet, Rfl: 1    ergocalciferol (ERGOCALCIFEROL) 1.25 MG (66709 UT) capsule, Take 1 capsule by mouth 2 (Two) Times a Week., Disp: 12 capsule, Rfl: 5    famotidine (PEPCID) 20 MG tablet, Take 1 tablet by mouth 2 (Two) Times a Day., Disp: 180 tablet, Rfl: 0    ibuprofen (ADVIL,MOTRIN) 600 MG tablet, Take 1 tablet by mouth Every 6 (Six) Hours As Needed for Mild Pain., Disp: 90 tablet, Rfl: 0    Lancets (OneTouch Delica Plus [id number]) misc, 1 each by Other route Daily., Disp: 100 each, Rfl: 5    levocetirizine (XYZAL) 5 MG tablet, Take 1 tablet by mouth Every  Evening., Disp: , Rfl:     metoprolol succinate XL (TOPROL-XL) 50 MG 24 hr tablet, Take 1 tablet by mouth Daily., Disp: 90 tablet, Rfl: 3    O2 (OXYGEN), Inhale 2 L/min 1 (One) Time., Disp: , Rfl:     OneTouch Verio test strip, Check FBS daily and prn for Diabetes, Disp: 100 each, Rfl: 5    oxybutynin XL (DITROPAN-XL) 10 MG 24 hr tablet, Take 1 tablet by mouth Daily. (Patient not taking: Reported on 6/6/2023), Disp: , Rfl:     pantoprazole (PROTONIX) 40 MG EC tablet, Take 1 tablet by mouth Daily., Disp: , Rfl:     [START ON 7/6/2023] Semaglutide,0.25 or 0.5MG/DOS, (OZEMPIC) 2 MG/3ML solution pen-injector, Inject 0.25 mg under the skin into the appropriate area as directed 1 (One) Time Per Week for 28 days, THEN 0.5 mg 1 (One) Time Per Week for 28 days., Disp: 3 mL, Rfl: 0    Spiriva Respimat 1.25 MCG/ACT aerosol solution inhaler, Inhale 2 puffs Daily., Disp: 4 g, Rfl: 2  Past Medical History:   Diagnosis Date    Asthma 2020    Covid and COPD    COPD (chronic obstructive pulmonary disease)     COVID-19     CPAP (continuous positive airway pressure) dependence     Diabetes mellitus     GERD (gastroesophageal reflux disease) 2020    Hyperlipidemia     Hypertension     Sleep apnea     cpap with o2    Urinary tract infection      Past Surgical History:   Procedure Laterality Date    BREAST AUGMENTATION      COLONOSCOPY N/A 5/30/2023    Procedure: COLONOSCOPY WITH ANESTHESIA;  Surgeon: Christopher Freeman MD;  Location: [facility] ENDOSCOPY;  Service: Gastroenterology;  Laterality: N/A;  preop; epigastric pain   postop polyp  PCP Nani denis    ENDOSCOPY N/A 5/30/2023    Procedure: ESOPHAGOGASTRODUODENOSCOPY WITH ANESTHESIA;  Surgeon: Christopher Freeman MD;  Location: [facility] ENDOSCOPY;  Service: Gastroenterology;  Laterality: N/A;  preop; epigastric pain  postop normal  PCP Nani denis    LAPAROSCOPIC CHOLECYSTECTOMY      TUBAL ABDOMINAL LIGATION             Review of Systems      Objective   PHYSICAL EXAM  Vital Signs:    There were no vitals taken for this visit.    Physical Exam  Constitutional:  No apparent distress, her vital signs are reviewed as recorded above  Psychiatric: Appropriate affect; Alert and oriented  Eyes: Unremarkable  Musculoskeletal: Normal gait and station  GI: Abdomen is soft, non-tender  Respiratory: No distress; Unlabored movement; No accessory musculature needed with symmetric movements  Skin: No pallor or diaphoresis  : Labia normal; Urethral meatus retracted due to body habitus but normal position, not stenotic; No significant bladder prolapse.  About 30 degree vesical neck hypermobility with significant leakage with cough in dorsolithotomy position.      DATA  Result Review :              Results for orders placed or performed in visit on 06/08/23   POC Urinalysis Dipstick, Multipro    Specimen: Urine   Result Value Ref Range    Color Yellow Yellow, Straw, Dark Yellow, Estefani    Clarity, UA Clear Clear    Glucose, UA Negative Negative mg/dL    Bilirubin Negative Negative    Ketones, UA Negative Negative    Specific Gravity  1.020 1.005 - 1.030    Blood, UA Trace (A) Negative    pH, Urine 8.5 (A) 5.0 - 8.0    Protein, POC Negative Negative mg/dL    Urobilinogen, UA 0.2 E.U./dL Normal, 0.2 E.U./dL    Nitrite, UA Negative Negative    Leukocytes Negative Negative                        ASSESSMENT AND PLAN          Problem List Items Addressed This Visit          Endocrine and Metabolic    Morbid obesity with BMI of 40.0-44.9, adult     Other Visit Diagnoses       Stress incontinence (female) (male)    -  Primary    Relevant Orders    POC Urinalysis Dipstick, Multipro (Completed)    Case Request (Completed)          Patient has significant stress incontinence.  I explained that options include physical therapy, medications (using off label indications), bladder neck and mid urethral slings, artificial urinary sphincter, and bulking agents.  The patient does favor a less invasive approach which I think is  "most effectively achieved using Bulkamid Urethral Bulking System. I did explain that Bulkamid is a urethral bulking agent approved for the treatment of female stress urinary incontinence.  I explained that it is made of a homogenous hydrogel consisting of predominantly water and cross-link polyacrylamide.  I explained the procedure is done using an endoscopic approach to allow reproducible placement of the Bulkamid cushions under direct vision.  I explained that studies have shown 85% of women report being cured or improved at 12 months with reasonable durability demonstrated at 7 years and no serious adverse events. Studies show about a 67% \"cure\" rate at 7 years.  I did confirm that there is no evidence of a fragile urethral mucosal lining, urethral hypermobility with straining angle greater than 30 degree from the horizontal bladder neck, predominant urge incontinence, detrusor overactivity, or polyuria.  Is also confirmed that the patient does not have prolapse greater than stage II on POP Q exam.  Stress incontinence.  I explained that Bulkamid has been associated with pain at the implant site, acute retention, urinary tract infection, hematuria, de kiah urge incontinence, dysuria, urinary urgency, vaginal infection/irritation/Lichen Sclerosus, and worsening urinary incontinence.            FOLLOW UP     No follow-ups on file.        (Please note that portions of this note were completed with a voice recognition program.)  Octaviano Wall MD  06/08/23  15:13 CDT   "

## 2023-06-08 NOTE — TELEPHONE ENCOUNTER
I spoke with patient and informed her of her pre-op date and surgery date and time. Informed patient nothing to eat or drink after midnight the night before surgery and stop any aspirins or blood thinners 7 days prior.  Patient verbalized understanding.

## 2023-06-08 NOTE — PROGRESS NOTES
CC: I am here for the doctor to look at my bladder    Cystoscopy procedure note  Pre- operative diagnosis:  incontinence    Post operative diagnosis:  Same    Procedure:  The patient was prepped and draped in a normal sterile fashion.  The urethra was anesthetized with 2% lidocaine jelly.  A flexible cystoscope was introduced per urethra.   The urethra is normal in appearance without obstruction or mass.  The bladder is without evidence of mucosal lesion.   There is no abnormality of the urothelium.  There is minimal trabeculation of the detrusor muscle.  The ureteral orifices are orthotopic in position and they efflux clear urine.    Patient tolerated the procedure well    Complications: none    Blood loss: minimal       ASSESSMENT AND PLAN          Problem List Items Addressed This Visit    None  Visit Diagnoses       Stress incontinence (female) (male)    -  Primary    Relevant Orders    POC Urinalysis Dipstick, Multipro          Given these findings, I had to evaluate the patient to assess fitness for surgery, formulate and discussa plan, that included alternatives, risks and benefits of management.. This went well beyond the typical description of procedural findings and therefore an additional evaluation and management was required.    No follow-ups on file.      Octaviano Wall MD  6/8/2023  12:32 CDT

## 2023-06-08 NOTE — PROGRESS NOTES
Pulmonary and Sleep Medicine    Jermaine Weiss (:  1970) is a 48 y.o. female,New patient, here for evaluation of the following chief complaint(s):  New Patient (DAISY with O2 dependency, post covid condition Covid 2021)      Referring physician:  Vick Diaz, Aprn - Cnp  [facility],  [address]     ASSESSMENT/PLAN:  1. DAISY (obstructive sleep apnea)  2. Moderate persistent reactive airway disease without complication  3. Sleep related hypoxia  4. Morbid obesity ([address]  5. Cigarette nicotine dependence without complication. discussed smoking cessation for 3.5 min. -     CT LUNG SCREENING; Future  6. Wheezing  -     CBC with Auto Differential; Future  -     IgE; Future  -     IgG; Future  -     IgM; Future  -     Alpha-1-Antitrypsin w Phenotype; Future  7. BALDERRAMA (dyspnea on exertion)  -     Full PFT Study With Bronchodilator; Future  -     6 Minute Walk Test  -     Echo 2d w doppler w color w contrast; Future  8. History of COVID-2021  -     Echo 2d w doppler w color w contrast; Future        Will get a CT of the Chest for screening for lung cancer. Will evaluate for eosinophilia and check IGE. Check alpha one   Full PFT  6 min walk today. Check cardiac function. Shira Palumbo MD, FCCP, DABSM    Return in about 4 weeks (around 2023). SUBJECTIVE/OBJECTIVE:  The patient is here for evaluation of obstructive sleep apnea and wheezing and shortness of breath. She developed COVID in 2021. Since then she has been short of breath. She does use inhalers. She uses a rescue inhaler at least 3 times a day. She is also on Symbicort and Spiriva however she does not use the Symbicort regularly. She was diagnosed with obstructive sleep apnea and she has been on CPAP for at least 2 years. She is also on oxygen with her CPAP. I was asked to see her regarding the above. No pulmonary function study on file. She smokes at least a pack a day.   She smoked

## 2023-06-08 NOTE — PROGRESS NOTES
CC: I am here for the doctor to look at my bladder    Cystoscopy procedure note  Pre- operative diagnosis:  incontinence    Post operative diagnosis:  Same    Procedure:  The patient was prepped and draped in a normal sterile fashion.  The urethra was anesthetized with 2% lidocaine jelly.  A flexible cystoscope was introduced per urethra.   The urethra is normal in appearance without obstruction or mass.  The bladder is without evidence of mucosal lesion.   There is no abnormality of the urothelium.  There is minimal trabeculation of the detrusor muscle.  The ureteral orifices are orthotopic in position and they efflux clear urine.    Patient tolerated the procedure well    Complications: none    Blood loss: minimal       ASSESSMENT AND PLAN          Problem List Items Addressed This Visit          Endocrine and Metabolic    Morbid obesity with BMI of 40.0-44.9, adult     Other Visit Diagnoses       Stress incontinence (female) (male)    -  Primary    Relevant Orders    POC Urinalysis Dipstick, Multipro (Completed)    Case Request (Completed)            Given these findings, I had to evaluate the patient to assess fitness for surgery, formulate and discussa plan, that included alternatives, risks and benefits of management.. This went well beyond the typical description of procedural findings and therefore an additional evaluation and management was required.        Octaviano Wall MD  6/8/2023  17:15 CDT

## 2023-06-11 LAB — IGE SERPL-ACNC: 23 KU/L

## 2023-06-12 LAB
A1AT PHENOTYP SERPL-IMP: NORMAL
A1AT SERPL-MCNC: 157 MG/DL (ref 90–200)

## 2023-06-30 PROBLEM — G47.33 OSA (OBSTRUCTIVE SLEEP APNEA): Status: ACTIVE | Noted: 2023-06-08

## 2023-06-30 PROBLEM — R06.2 WHEEZING: Status: ACTIVE | Noted: 2023-06-08

## 2023-06-30 PROBLEM — R06.09 DOE (DYSPNEA ON EXERTION): Status: ACTIVE | Noted: 2023-06-08

## 2023-06-30 PROBLEM — E66.01 MORBID OBESITY: Status: ACTIVE | Noted: 2023-06-08

## 2023-06-30 PROBLEM — F17.210 CIGARETTE NICOTINE DEPENDENCE WITHOUT COMPLICATION: Status: ACTIVE | Noted: 2023-06-08

## 2023-06-30 PROBLEM — J45.909 REACTIVE AIRWAY DISEASE: Status: ACTIVE | Noted: 2023-06-08

## 2023-06-30 PROBLEM — G47.34 SLEEP RELATED HYPOXIA: Status: ACTIVE | Noted: 2023-06-08

## 2023-09-08 ENCOUNTER — OFFICE VISIT (OUTPATIENT)
Dept: FAMILY MEDICINE CLINIC | Facility: CLINIC | Age: 53
End: 2023-09-08
Payer: COMMERCIAL

## 2023-09-08 VITALS
OXYGEN SATURATION: 96 % | BODY MASS INDEX: 41.99 KG/M2 | HEART RATE: 93 BPM | TEMPERATURE: 98.6 F | DIASTOLIC BLOOD PRESSURE: 79 MMHG | HEIGHT: 63 IN | WEIGHT: 237 LBS | RESPIRATION RATE: 18 BRPM | SYSTOLIC BLOOD PRESSURE: 121 MMHG

## 2023-09-08 VITALS
BODY MASS INDEX: 41.99 KG/M2 | WEIGHT: 237 LBS | HEART RATE: 93 BPM | SYSTOLIC BLOOD PRESSURE: 121 MMHG | HEIGHT: 63 IN | OXYGEN SATURATION: 99 % | DIASTOLIC BLOOD PRESSURE: 79 MMHG | RESPIRATION RATE: 18 BRPM | TEMPERATURE: 98.6 F

## 2023-09-08 DIAGNOSIS — M79.605 LEG PAIN, LEFT: ICD-10-CM

## 2023-09-08 DIAGNOSIS — Z87.891 PERSONAL HISTORY OF TOBACCO USE, PRESENTING HAZARDS TO HEALTH: ICD-10-CM

## 2023-09-08 DIAGNOSIS — G44.1 OTHER VASCULAR HEADACHE: ICD-10-CM

## 2023-09-08 DIAGNOSIS — K21.00 GASTROESOPHAGEAL REFLUX DISEASE WITH ESOPHAGITIS WITHOUT HEMORRHAGE: ICD-10-CM

## 2023-09-08 DIAGNOSIS — J30.2 SEASONAL ALLERGIES: ICD-10-CM

## 2023-09-08 DIAGNOSIS — Z71.6 TOBACCO ABUSE COUNSELING: ICD-10-CM

## 2023-09-08 DIAGNOSIS — E11.65 CONTROLLED TYPE 2 DIABETES MELLITUS WITH HYPERGLYCEMIA, WITHOUT LONG-TERM CURRENT USE OF INSULIN: Primary | ICD-10-CM

## 2023-09-08 DIAGNOSIS — E78.2 MIXED HYPERLIPIDEMIA: ICD-10-CM

## 2023-09-08 DIAGNOSIS — N30.91 CYSTITIS WITH HEMATURIA: ICD-10-CM

## 2023-09-08 DIAGNOSIS — Z00.01 ENCOUNTER FOR WELL ADULT EXAM WITH ABNORMAL FINDINGS: ICD-10-CM

## 2023-09-08 DIAGNOSIS — R30.0 DYSURIA: ICD-10-CM

## 2023-09-08 DIAGNOSIS — Z72.0 TOBACCO USE: Primary | ICD-10-CM

## 2023-09-08 DIAGNOSIS — I10 PRIMARY HYPERTENSION: ICD-10-CM

## 2023-09-08 LAB
BILIRUB BLD-MCNC: NEGATIVE MG/DL
CLARITY, POC: ABNORMAL
COLOR UR: YELLOW
GLUCOSE UR STRIP-MCNC: NEGATIVE MG/DL
KETONES UR QL: NEGATIVE
LEUKOCYTE EST, POC: NEGATIVE
NITRITE UR-MCNC: NEGATIVE MG/ML
PH UR: 7 [PH] (ref 5–8)
PROT UR STRIP-MCNC: NEGATIVE MG/DL
RBC # UR STRIP: ABNORMAL /UL
SP GR UR: 1.01 (ref 1–1.03)
UROBILINOGEN UR QL: ABNORMAL

## 2023-09-08 RX ORDER — IBUPROFEN 600 MG/1
600 TABLET ORAL EVERY 6 HOURS PRN
Qty: 90 TABLET | Refills: 0 | Status: SHIPPED | OUTPATIENT
Start: 2023-09-08

## 2023-09-08 RX ORDER — CHLORTHALIDONE 25 MG/1
25 TABLET ORAL DAILY
Qty: 90 TABLET | Refills: 1 | Status: SHIPPED | OUTPATIENT
Start: 2023-09-08

## 2023-09-08 RX ORDER — CIPROFLOXACIN 500 MG/1
500 TABLET, FILM COATED ORAL 2 TIMES DAILY
Qty: 14 TABLET | Refills: 0 | Status: SHIPPED | OUTPATIENT
Start: 2023-09-08 | End: 2023-09-15

## 2023-09-08 RX ORDER — FAMOTIDINE 20 MG/1
20 TABLET, FILM COATED ORAL 2 TIMES DAILY
Qty: 180 TABLET | Refills: 0 | Status: SHIPPED | OUTPATIENT
Start: 2023-09-08

## 2023-09-08 RX ORDER — BUPROPION HYDROCHLORIDE 150 MG/1
150 TABLET, EXTENDED RELEASE ORAL 2 TIMES DAILY
Qty: 180 TABLET | Refills: 0 | Status: SHIPPED | OUTPATIENT
Start: 2023-09-08 | End: 2023-12-07

## 2023-09-08 RX ORDER — LEVOCETIRIZINE DIHYDROCHLORIDE 5 MG/1
5 TABLET, FILM COATED ORAL EVERY EVENING
Qty: 90 TABLET | Refills: 0 | Status: SHIPPED | OUTPATIENT
Start: 2023-09-08

## 2023-09-08 RX ORDER — ATORVASTATIN CALCIUM 40 MG/1
40 TABLET, FILM COATED ORAL NIGHTLY
Qty: 90 TABLET | Refills: 0 | Status: SHIPPED | OUTPATIENT
Start: 2023-09-08

## 2023-09-08 RX ORDER — TIZANIDINE 4 MG/1
4 TABLET ORAL NIGHTLY PRN
Qty: 90 TABLET | Refills: 0 | Status: SHIPPED | OUTPATIENT
Start: 2023-09-08

## 2023-09-08 NOTE — PROGRESS NOTES
Chief Complaint  Urinary Tract Infection    Subjective        Anastasiia Rubio presents to [facility]  History of Present Illness    The patient presents for concern of possible urinary tract infection. She is experiencing constant urinary frequency, urgency, dysuria, and urinary hesitancy. She has pain in her back and flank areas. She denies fever or chills. She rates her pain at 5/10. She has been drinking cranberry juice, and she did not notice any difference. She is not sexually active. She has had pyelonephritis. She still has her bilateral kidneys. She denies a history of nephrolithiasis. She has recurrent urinary tract infections. She is scheduled for a cystoscopy with bulking agent on 10/09/2023.    She describes severe pain like being poked with needles in the dorsal aspect of her left foot when she hits or stretches the lateral portion of her left lower extremity. She also experiences pain when clothing or blankets rub over the affected area. She noticed this pain approximately 1.5 weeks ago, and she experiences it at least once daily. Her initial pain episode occurred when her cousin grabbed her ankle and pulled on her lower extremity. She denies any injury to her left lower extremity. She rates her pain episodes at 1000/10. She denies difficulty standing or left lower extremity weakness. She denies foreign bodies. Her pain is not aggravated by movement. She tried rest with no relief.    She presented to the emergency room in [place], [place] early in 2023 for severe back pain. She denies any injury. She underwent imaging which was significant for sciatica. She was provided with medication that provided relief, but she is uncertain of the name of the medication. She was offered stronger pain medication, which she declined. She has constant back pain with light activity. She requests a prescription of Motrin.    The patient weighed 249 pounds in 06/2023. She weighs 237  "pounds in clinic. Her current BMI is 42 kg/m2. She requests a refill of Ozempic.    Her chronic problems include GERD, stable with pantoprazole and Pepcid; overactive bladder, stable with oxybutynin; vitamin D deficiency, stable with ergocalciferol; hyperlipidemia, stable with atorvastatin; COPD, stable with Spiriva, Respimat, albuterol, and Symbicort; hypertension, stable with Hygroton and metoprolol; and seasonal allergies, stable with Xyzal.    The following portions of the patient's history were reviewed and updated as appropriate: allergies, current medications, past family history, past medical history, past social history, past surgical history and problem list.      Objective   Vital Signs:  /79 (BP Location: Left arm, Patient Position: Sitting, Cuff Size: Large Adult)   Pulse 93   Temp 98.6 °F (37 °C) (Infrared)   Resp 18   Ht 160 cm (63\")   Wt 108 kg (237 lb)   SpO2 96%   BMI 41.98 kg/m²   Estimated body mass index is 41.98 kg/m² as calculated from the following:    Height as of this encounter: 160 cm (63\").    Weight as of this encounter: 108 kg (237 lb).               Physical Exam  Vitals and nursing note reviewed.   Constitutional:       General: She is awake.      Appearance: Normal appearance. She is well-developed and well-groomed.   HENT:      Head: Normocephalic and atraumatic.      Right Ear: Hearing, tympanic membrane, ear canal and external ear normal.      Left Ear: Hearing, tympanic membrane, ear canal and external ear normal.      Nose: Nose normal.      Mouth/Throat:      Lips: Pink.      Pharynx: Oropharynx is clear.   Eyes:      General: Lids are normal.      Conjunctiva/sclera: Conjunctivae normal.   Cardiovascular:      Rate and Rhythm: Normal rate and regular rhythm.      Heart sounds: Normal heart sounds.   Pulmonary:      Effort: Pulmonary effort is normal.      Breath sounds: Normal breath sounds and air entry.   Musculoskeletal:      Cervical back: Full passive range " of motion without pain.      Right lower leg: No edema.      Left lower leg: No edema.   Lymphadenopathy:      Head:      Right side of head: No submental, submandibular or tonsillar adenopathy.      Left side of head: No submental, submandibular or tonsillar adenopathy.   Skin:     General: Skin is warm and dry.   Neurological:      General: No focal deficit present.      Mental Status: She is alert and oriented to person, place, and time.      Sensory: Sensation is intact.      Motor: Motor function is intact.      Coordination: Coordination is intact.      Gait: Gait is intact.   Psychiatric:         Attention and Perception: Attention and perception normal.         Mood and Affect: Mood and affect normal.         Speech: Speech normal.         Behavior: Behavior normal. Behavior is cooperative.         Thought Content: Thought content normal.         Cognition and Memory: Cognition and memory normal.         Judgment: Judgment normal.      Result Review :                   Assessment and Plan   Diagnoses and all orders for this visit:    1. Controlled type 2 diabetes mellitus with hyperglycemia, without long-term current use of insulin (Primary)  -     Semaglutide-Weight Management 1 MG/0.5ML solution auto-injector; Inject 1 mL under the skin into the appropriate area as directed 1 (One) Time Per Week.  Dispense: 2 mL; Refill: 0    2. Primary hypertension  -     chlorthalidone (HYGROTON) 25 MG tablet; Take 1 tablet by mouth Daily.  Dispense: 90 tablet; Refill: 1    3. Mixed hyperlipidemia  -     atorvastatin (LIPITOR) 40 MG tablet; Take 1 tablet by mouth Every Night.  Dispense: 90 tablet; Refill: 0    4. Dysuria  -     POCT urinalysis dipstick, multipro  -     Urine Culture - Urine, Urine, Clean Catch; Future  -     Urine Culture - Urine, Urine, Clean Catch  -     ciprofloxacin (CIPRO) 500 MG tablet; Take 1 tablet by mouth 2 (Two) Times a Day for 7 days.  Dispense: 14 tablet; Refill: 0    5. Seasonal  allergies  -     levocetirizine (XYZAL) 5 MG tablet; Take 1 tablet by mouth Every Evening.  Dispense: 90 tablet; Refill: 0    6. Other vascular headache  -     ibuprofen (ADVIL,MOTRIN) 600 MG tablet; Take 1 tablet by mouth Every 6 (Six) Hours As Needed for Mild Pain.  Dispense: 90 tablet; Refill: 0    7. Cystitis with hematuria  -     ciprofloxacin (CIPRO) 500 MG tablet; Take 1 tablet by mouth 2 (Two) Times a Day for 7 days.  Dispense: 14 tablet; Refill: 0    8. Gastroesophageal reflux disease with esophagitis without hemorrhage  -     famotidine (PEPCID) 20 MG tablet; Take 1 tablet by mouth 2 (Two) Times a Day.  Dispense: 180 tablet; Refill: 0    9. Leg pain, left  -     tiZANidine (ZANAFLEX) 4 MG tablet; Take 1 tablet by mouth At Night As Needed for Muscle Spasms.  Dispense: 90 tablet; Refill: 0  -     Cancel: Duplex Venous Lower Extremity - Left CAR  -     US Ankle / Brachial Indices Extremity Complete; Future  -     US Venous Doppler Lower Extremity Left (duplex)      tains abnormal data POCT urinalysis dipstick, multipro  Order: [id number]  Status: Final result       Visible to patient: No (scheduled for 9/8/2023  2:40 PM)       Next appt: 10/02/2023 at 01:00 PM in Pre-Admission Testing ([facility] PAD PAT 30)       Dx: Dysuria    Specimen Information: Urine   0 Result Notes           Component  Ref Range & Units 13:39  (9/8/23)  [place] 3 mo ago  (6/8/23)  [place] 3 mo ago  (6/1/23)  [place] 4 mo ago  (5/3/23)  [place] 1 yr ago  (10/4/21)  [place] 2 yr ago  (6/10/21)  [place]   Color  Yellow, Straw, Dark Yellow, Estefani Yellow Yellow Yellow Yellow Yellow Yellow R   Clarity, UA  Clear Slightly Cloudy Abnormal  Clear Clear Clear Clear Cloudy Abnormal    Glucose, UA  Negative mg/dL Negative Negative Negative Negative Negative R Negative R   Bilirubin  Negative Negative Negative Negative Negative Negative Negative   Ketones, UA  Negative Negative Negative Negative Negative  Negative Negative   Specific Gravity  1.005 - 1.030 1.015 1.020 1.010 1.010 1.010 1.008   Blood, UA  Negative Trace Abnormal  Trace Abnormal  Negative Trace Abnormal  Negative Negative   pH, Urine  5.0 - 8.0 7.0 8.5 Abnormal  5.5 6.0 6.5 6.5   Protein, POC  Negative mg/dL Negative Negative Negative Negative Negative Negative R   Urobilinogen, UA  Normal, 0.2 E.U./dL 0.2 E.U./dL 0.2 E.U./dL 0.2 E.U./dL 0.2 E.U./dL Normal R 0.2 E.U./dL R   Nitrite, UA  Negative Negative Negative Negative Negative Negative Negative   Leukocytes  Negative Negative Negative Negative Negative Negative Negative   Lot Number    [id number]     Expiration Date    05/31/2024     Resulting Agency [facility]              Specimen Collected: 09/08/23 13:39 CDT Last Resulted: 09/08/23 13:39 CDT                          Follow Up     Return in about 1 month (around 10/8/2023) for Recheck  left leg, prn urine issues.    Patient was given instructions and counseling regarding her condition or for health maintenance advice. Please see specific information pulled into the AVS if appropriate.       Transcribed from ambient dictation for Nani Adkins DNP, APRN by Srini Alves.  09/08/23   17:36 CDT    Patient or patient representative verbalized consent to the visit recording.  I have personally performed the services described in this document as transcribed by the above individual, and it is both accurate and complete.    Electronically signed by Nani Adkins DNP, APRALEX, 09/11/23, 2:18 PM CDT.

## 2023-09-08 NOTE — PROGRESS NOTES
CC: well visit     Subjective        Anastasiia Rubio is a 53 y.o. female who presents for an annual well       The following portions of the patient's history were reviewed and updated as appropriate: allergies, current medications, past family history, past medical history, past social history, past surgical history, and problem list.    Compared to one year ago, the patient feels her physical health is better.    Compared to one year ago, the patient feels her mental health is better.    Recent Hospitalizations:  She was not admitted to the hospital during the last year.     Current Medical Providers:  Patient Care Team:  Nani Adkins, DNP, APRN as PCP - General (Family Medicine)  Christopher Freeman MD as Consulting Physician (Gastroenterology)    Outpatient Medications Prior to Visit   Medication Sig Dispense Refill    albuterol (PROVENTIL) (2.5 MG/3ML) 0.083% nebulizer solution USE 1 VIAL IN NEBULIZER EVERY 4 HOURS AS NEEDED      albuterol sulfate  (90 Base) MCG/ACT inhaler Inhale 2 puffs Every 4 (Four) Hours As Needed for Wheezing or Shortness of Air. 18 g 2    Blood Glucose Monitoring Suppl (OneTouch Verio Flex System) w/Device kit Test blood glucose level 1-2 times per day [id number]      budesonide-formoterol (SYMBICORT) 80-4.5 MCG/ACT inhaler Take  by mouth Every 6 (Six) Hours.      ergocalciferol (ERGOCALCIFEROL) 1.25 MG (85936 UT) capsule Take 1 capsule by mouth 2 (Two) Times a Week. 12 capsule 5    Lancets (OneTouch Delica Plus [id number]) misc 1 each by Other route Daily. 100 each 5    metoprolol succinate XL (TOPROL-XL) 50 MG 24 hr tablet Take 1 tablet by mouth Daily. 90 tablet 3    O2 (OXYGEN) Inhale 2 L/min 1 (One) Time.      OneTouch Verio test strip Check FBS daily and prn for Diabetes 100 each 5    oxybutynin XL (DITROPAN-XL) 10 MG 24 hr tablet Take 1 tablet by mouth Daily.      pantoprazole (PROTONIX) 40 MG EC tablet Take 1 tablet by mouth Daily. 90 tablet 1    Spiriva Respimat 1.25 MCG/ACT  aerosol solution inhaler Inhale 2 puffs Daily. 4 g 2    atorvastatin (LIPITOR) 40 MG tablet Take 1 tablet by mouth Every Night. 90 tablet 0    chlorthalidone (HYGROTON) 25 MG tablet Take 1 tablet by mouth Daily. 90 tablet 1    famotidine (PEPCID) 20 MG tablet Take 1 tablet by mouth 2 (Two) Times a Day. 180 tablet 0    ibuprofen (ADVIL,MOTRIN) 600 MG tablet Take 1 tablet by mouth Every 6 (Six) Hours As Needed for Mild Pain. 90 tablet 0    levocetirizine (XYZAL) 5 MG tablet Take 1 tablet by mouth Every Evening. 90 tablet 0     No facility-administered medications prior to visit.     No opioid medication identified on active medication list. I have reviewed chart for other potential  high risk medication/s and harmful drug interactions in the elderly.      Aspirin is not on active medication list.  Aspirin use is not indicated based on review of current medical condition/s. Risk of harm outweighs potential benefits.  .    Patient Active Problem List   Diagnosis    Amenorrhea    Cigarette smoker    Mixed incontinence    Actinic keratitis    Asthma    COPD (chronic obstructive pulmonary disease)    Cough    Depression    Diabetes mellitus, type 2    Elevated hemoglobin    Erythrocytosis    ET (eustachian tube disorder)    Fracture of middle phalanx of finger    Gas pain    Hearing loss    GERD (gastroesophageal reflux disease)    Hemoptysis    History of 2019 novel coronavirus disease (COVID-19)    Hyperlipidemia    Hypertension    Hypothyroidism    Left-sided low back pain without sciatica    Leg edema    Leukocytosis    Loss of smell    Low HDL (under 40)    Class 3 severe obesity due to excess calories with serious comorbidity and body mass index (BMI) of 40.0 to 44.9 in adult    Prediabetes    Seborrheic keratosis    Stress incontinence, female    Tobacco abuse    Tobacco dependence    Transaminitis    Weight gain    Amenorrhea    Obstructive sleep apnea syndrome    Encounter for screening for malignant neoplasm of  "colon    Epigastric pain    Inappropriate sinus tachycardia    Morbid obesity with BMI of 40.0-44.9, adult    Supplemental oxygen dependent    Sleep apnea    Depressive disorder    Hyperlipidemia    Hypertension    RABAGO (dyspnea on exertion)    Wheezing    Morbid obesity    MIKE (obstructive sleep apnea)    Reactive airway disease    Sleep related hypoxia    Cigarette nicotine dependence without complication     Advance Care Planning   Advance Care Planning     Advance Directive is not on file.  ACP discussion was held with the patient during this visit. Patient does not have an advance directive, information provided.     Objective    Vitals:    23 1359   BP: 121/79   BP Location: Left arm   Patient Position: Sitting   Cuff Size: Large Adult   Pulse: 93   Resp: 18   Temp: 98.6 °F (37 °C)   TempSrc: Infrared   SpO2: 99%   Weight: 108 kg (237 lb)   Height: 160 cm (63\")  Comment: per patient   PainSc: 0-No pain     Estimated body mass index is 41.98 kg/m² as calculated from the following:    Height as of this encounter: 160 cm (63\").    Weight as of this encounter: 108 kg (237 lb).    Does the patient have evidence of cognitive impairment? No    Lab Results   Component Value Date    HGBA1C 6.20 (H) 2023          HEALTH RISK ASSESSMENT    Smoking Status: not willing to quit   Social History     Tobacco Use   Smoking Status Every Day    Packs/day: 1.00    Years: 35.00    Pack years: 35.00    Types: Cigarettes    Passive exposure: Past   Smokeless Tobacco Never     Alcohol Consumption:  Social History     Substance and Sexual Activity   Alcohol Use Never     Fall Risk Screen:  TOSHIA Fall Risk Assessment was completed, and patient is at LOW risk for falls.Assessment completed on:2023    Depression Screenin/8/2023     2:00 PM   PHQ-2/PHQ-9 Depression Screening   Little Interest or Pleasure in Doing Things 0-->not at all   Feeling Down, Depressed or Hopeless 0-->not at all   PHQ-9: Brief Depression " Severity Measure Score 0       Health Habits and Functional and Cognitive Screenin/8/2023     2:00 PM   Functional & Cognitive Status   Do you have difficulty preparing food and eating? No   Do you have difficulty bathing yourself, getting dressed or grooming yourself? No   Do you have difficulty using the toilet? No   Do you have difficulty moving around from place to place? No   Do you have trouble with steps or getting out of a bed or a chair? No   Current Diet Well Balanced Diet   Dental Exam Up to date   Eye Exam Up to date   Exercise (times per week) 4 times per week   Do you need help using the phone?  No   Are you deaf or do you have serious difficulty hearing?  No   Do you need help to go to places out of walking distance? No   Do you need help shopping? No   Do you need help preparing meals?  No   Do you need help with housework?  No   Do you need help with laundry? No   Do you need help taking your medications? No   Do you need help managing money? No   Do you ever drive or ride in a car without wearing a seat belt? No   Have you felt unusual stress, anger or loneliness in the last month? No   Who do you live with? Spouse   If you need help, do you have trouble finding someone available to you? No   Have you been bothered in the last four weeks by sexual problems? No   Do you have difficulty concentrating, remembering or making decisions? No     Age-appropriate Screening Schedule:  Refer to the list below for future screening recommendations based on patient's age, sex and/or medical conditions. Orders for these recommended tests are listed in the plan section. The patient has been provided with a written plan.    Health Maintenance   Topic Date Due    Hepatitis B (1 of 3 - 3-dose series) Never done    LUNG CANCER SCREENING  2023    COVID-19 Vaccine (4 - Moderna series) 09/10/2023 (Originally 3/7/2022)    PAP SMEAR  10/31/2023 (Originally 2019)    DIABETIC EYE EXAM  2023  (Originally 2/28/2019)    ZOSTER VACCINE (2 of 2) 09/30/2024 (Originally 7/22/2022)    INFLUENZA VACCINE  10/01/2023    HEMOGLOBIN A1C  12/30/2023    LIPID PANEL  01/05/2024    DIABETIC FOOT EXAM  05/03/2024    URINE MICROALBUMIN  05/03/2024    BMI FOLLOWUP  06/30/2024    MAMMOGRAM  08/05/2024    ANNUAL PHYSICAL  09/08/2024    COLORECTAL CANCER SCREENING  05/30/2026    TDAP/TD VACCINES (2 - Td or Tdap) 05/27/2032    HEPATITIS C SCREENING  Completed    Pneumococcal Vaccine 0-64  Completed        Physical Exam  Vitals and nursing note reviewed.   Constitutional:       General: She is awake.      Appearance: Normal appearance. She is well-developed and well-groomed.   HENT:      Head: Normocephalic and atraumatic.      Right Ear: Hearing, tympanic membrane, ear canal and external ear normal.      Left Ear: Hearing, tympanic membrane, ear canal and external ear normal.      Nose: Nose normal.      Mouth/Throat:      Lips: Pink.      Pharynx: Oropharynx is clear.   Eyes:      General: Lids are normal.      Conjunctiva/sclera: Conjunctivae normal.   Cardiovascular:      Rate and Rhythm: Normal rate and regular rhythm.      Heart sounds: Normal heart sounds.   Pulmonary:      Effort: Pulmonary effort is normal.      Breath sounds: Normal breath sounds and air entry.   Musculoskeletal:      Cervical back: Full passive range of motion without pain.      Right lower leg: No edema.      Left lower leg: No edema.        Legs:       Comments: Numbness and pain on the lateral side of the left lower leg and foot   Lymphadenopathy:      Head:      Right side of head: No submental, submandibular or tonsillar adenopathy.      Left side of head: No submental, submandibular or tonsillar adenopathy.   Skin:     General: Skin is warm and dry.   Neurological:      Mental Status: She is alert.      Sensory: Sensation is intact.      Motor: Motor function is intact.      Coordination: Coordination is intact.      Gait: Gait is intact.    Psychiatric:         Attention and Perception: Attention and perception normal.         Mood and Affect: Mood and affect normal.         Speech: Speech normal.         Behavior: Behavior normal. Behavior is cooperative.         Thought Content: Thought content normal.         Cognition and Memory: Cognition and memory normal.         Judgment: Judgment normal.         ASSESSMENT  Diagnoses and all orders for this visit:    1. Tobacco use (Primary)  -     CT chest low dose wo; Future  -     buPROPion SR (Wellbutrin SR) 150 MG 12 hr tablet; Take 1 tablet by mouth 2 (Two) Times a Day for 90 days. Take 150mg daily for 3 days, then 150mg 2 times daily  Dispense: 180 tablet; Refill: 0    2. Encounter for well adult exam with abnormal findings    3. Personal history of tobacco use, presenting hazards to health  -     CT chest low dose wo; Future    4. Tobacco abuse counseling  -     buPROPion SR (Wellbutrin SR) 150 MG 12 hr tablet; Take 1 tablet by mouth 2 (Two) Times a Day for 90 days. Take 150mg daily for 3 days, then 150mg 2 times daily  Dispense: 180 tablet; Refill: 0      Health Maintenance Summary    Overdue - Hepatitis B: (1 of 3 - 3-dose series): Overdue - never done  No completion, postpone, frequency change, or communication history exists for this topic.     Ordered - LUNG CANCER SCREENING: (Yearly): Ordered on 9/8/2023 05/03/2023  Postponed until 5/3/2023 by Amna Kraus MA (Pending event)    07/26/2022  CT CHEST LOW DOSE WO CANCER SCREENING    05/15/2020  CT Chest With Contrast Diagnostic      Postponed - COVID-19 Vaccine: (4 - Moderna series): Postponed until 9/10/2023  09/08/2023  Postponed until 9/10/2023 by Nani Adkins, CHANDU, APRN (Patient Refused)    06/30/2023  Postponed until 7/2/2023 by Bonita Salgado MA (Patient Refused)    06/06/2023  Postponed until 6/8/2023 by Bonita Salgado MA (Patient Refused)    05/03/2023  Postponed until 5/3/2023 by Amna Kraus MA (Pending event)    01/24/2023   Postponed until 1/26/2023 by Bonita Salgado MA (Patient Refused)         Postponed - PAP SMEAR: (Every 3 Years): Postponed until 10/31/2023  09/08/2023  Postponed until 10/31/2023 by Nani Adkins DNP, APRN (Pending event)    05/03/2023  Postponed until 7/18/2023 by Nani Adkins DNP, APRN (Pending event)      Postponed - DIABETIC EYE EXAM: (Yearly): next eye exam November   Postponed until 11/6/2023 09/08/2023  Postponed until 11/6/2023 by Nani Adkins DNP, APRN (Pending event - scheduled for november)    05/03/2023  Postponed until 5/3/2023 by Amna Kraus MA (Pending event)      Postponed - ZOSTER VACCINE: (2 of 2): Postponed until 9/30/2024 09/08/2023  Postponed until 9/30/2024 by Nani Adkins DNP, APRN (Pending event)    06/30/2023  Postponed until 6/30/2023 by Bonita Salgado MA (Patient Refused)    06/06/2023  Postponed until 6/6/2023 by Bonita Salgado MA (Patient Refused)    05/03/2023  Postponed until 5/3/2023 by Amna Kraus MA (Pending event)    01/24/2023  Postponed until 1/24/2023 by Bonita Salgado MA (Patient Refused)         INFLUENZA VACCINE: (Yearly - October to March): Next due on 10/1/2023  12/09/2022  Imm Admin: FluLaval/Fluzone >6mos    12/09/2022  Imm Admin: Influenza, Unspecified    11/15/2021  Imm Admin: Influenza, Unspecified    11/15/2021  Imm Admin: FluLaval/Fluzone >6mos    10/23/2020  Imm Admin: Influenza, Unspecified         HEMOGLOBIN A1C: (Every 6 Months)  Next due on 12/30/2023 06/30/2023  Hemoglobin A1C component of Hemoglobin A1c    05/03/2023  Hemoglobin A1C component of Hemoglobin A1c    01/05/2023  Hemoglobin A1C component of Hemoglobin A1c    08/02/2022  Hemoglobin A1C component of POCT GLYCOSYLATED HEMOGLOBIN (HGB A1C)    12/09/2021  Hemoglobin A1C component of Hemoglobin A1c         LIPID PANEL: (Yearly): Next due on 1/5/2024 01/05/2023  Lipid Panel    12/09/2021  LIPID PANEL W/ CHOL/HDL RATIO      DIABETIC FOOT EXAM: (Yearly): Next due on  5/3/2024  05/03/2023  Done      URINE MICROALBUMIN: (Yearly): Next due on 5/3/2024  05/03/2023  Multiple components of Microalbumin / Creatinine Urine Ratio - Urine, Clean Catch    06/30/2021  Microalbumin, Urine component of Albumin, urine, random      BMI FOLLOWUP: (Yearly): Next due on 6/30/2024 06/30/2023  SmartData: WORKFLOW - QUALITY MEASUREMENT - BMI FOLLOW UP CARE PLAN DOCUMENTED    05/03/2023  SmartData: WORKFLOW - QUALITY MEASUREMENT - BMI FOLLOW UP CARE PLAN DOCUMENTED    02/07/2023  SmartData: WORKFLOW - QUALITY MEASUREMENT - BMI FOLLOW UP CARE PLAN DOCUMENTED    01/24/2023  SmartData: WORKFLOW - QUALITY MEASUREMENT - BMI FOLLOW UP CARE PLAN DOCUMENTED    01/05/2023  SmartData: WORKFLOW - QUALITY MEASUREMENT - BMI FOLLOW UP CARE PLAN DOCUMENTED         MAMMOGRAM: (Every 2 Years): Next due on 8/5/2024 08/05/2022  Mammo Screening Digital Tomosynthesis Bilateral With CAD    03/25/2019  Mammo Screening Digital Tomosynthesis Bilateral With CAD    09/21/2012  Mammo Screening Digital Tomosynthesis Bilateral With CAD      ANNUAL PHYSICAL: (Yearly): Next due on 9/8/2024 09/08/2023  Done      COLORECTAL CANCER SCREENING: (COLONOSCOPY - Every 3 Years): Next due on 5/30/2026 05/30/2023  COLONOSCOPY    05/30/2023  Surgical Procedure: COLONOSCOPY    05/03/2023  Postponed until 5/30/2023 by Nani Adkins, DNP, APRN (Pending event)      TDAP/TD VACCINES: (2 - Td or Tdap): Next due on 5/27/2032 05/27/2022  Imm Admin: Tdap      HEPATITIS C SCREENING: Completed  05/27/2022  HEPATITIS C ANTIBODY      Pneumococcal Vaccine 0-64: (Series Information): Completed  12/09/2022  Imm Admin: Pneumococcal Conjugate 20-Valent (PCV20)    05/27/2022  Imm Admin: Pneumococcal Polysaccharide (PPSV23)      Alcohol Misuse: no  Chronic Pain: Chronic Pain Educational material Discussed and Shared in After Visit Summary for Patient.  Depression/Dysphoria: denies  Drug Use/Abuse Identified or Suspected: denies  Fall Risk-High or  Moderate: Discussed Fall Prevention in the home  Hearing Problem: denies  Immunizations Discussed/Encouraged: Shingrix  Polypharmacy: Medication List reviewed and Medications are appropriate for patient  Tobacco Use/Dependance Risk (use dotphrase .tobaccocessation for documentation)  Dental Screening Recommended  Vision Screening Recommended    SUBJECTIVE:         Anastasiia Rubio  reports that she has been smoking cigarettes. She has a 35.00 pack-year smoking history. She has been exposed to tobacco smoke. She has never used smokeless tobacco.. I have educated her on the risk of diseases from using tobacco products such as cancer, COPD, and heart disease.     I advised her to quit and she is not willing to quit. We have discussed the following method/s for tobacco cessation:  Education Material Counseling Prescription Medicaiton.  Together we have set a quit date for 3 weeks from today.  She will follow up with me in 3 months or sooner to check on her progress.    I spent 3  minutes counseling the patient.        Low-Dose Lung Cancer CT Screening Visit    CHIEF COMPLAINT:    Shared Decision Making  I am discussing tobacco cessation today with Anastasiia Rubio    SMOKING HISTORY:     Social History     Tobacco Use   Smoking Status Every Day    Packs/day: 1.00    Years: 35.00    Pack years: 35.00    Types: Cigarettes    Passive exposure: Past   Smokeless Tobacco Never           Anastasiia Rubio is currently smoking 1 pack(s) per day with a  35 pack year history.  Reports no use of alternate forms of tobacco, electronic cigarettes, marijuana or other substances.  Based on the recommendation of the [place] Preventive Services Task Force, this patient is at high risk for lung cancer and a low-dose CT screening scan is recommended.     The patient has had no hemoptysis, unintentional weight loss or increasing shortness of breath. The patient is asymptomatic and has no signs or symptoms of lung cancer.     Together we discussed  "the potential benefits and potential harms of being screened for lung cancer including the potential for follow up diagnostic testing, risk for over diagnosis, false positive rate and radiation exposure using the [facility] Lung Cancer Screening Shared Decision-Making Tool. A copy of this decision aid resource has been provided in the after visit summary.  We also reviewed the patient's smoking history and counseled her on the importance and health benefits of stopping the use of tobacco products.      OBJECTIVE:    /79 (BP Location: Left arm, Patient Position: Sitting, Cuff Size: Large Adult)   Pulse 93   Temp 98.6 °F (37 °C) (Infrared)   Resp 18   Ht 160 cm (63\") Comment: per patient  Wt 108 kg (237 lb)   LMP  (LMP Unknown) Comment: lmp January  SpO2 99%   BMI 41.98 kg/m²   General: no distress, alert and oriented  Chest: Lung sounds are clear to auscultation, no wheezes, rales or rhonchi, with symmetric air entry. No respiratory distress  Cardiovascular: RRR with no murmur auscultated      Smoking Cessation discussion:     We discussed that there are a number of resources and interventions to assist with smoking cessation if needed in the future.   On a scale of zero to ten, the patient rates their motivation to quit at a 6 out of 10 today.  Referral to stop smoking class has been offered. Medication options for tobacco cessation have been discussed with the patient.     Recommendations for continued lung cancer screening:      We discussed the NCCN guidelines for lung cancer screening and the patient verbalized understanding that annual screening is recommended until fifteen years beyond smoking as long as they have no other disease or comorbidity that would prevent them from receiving cancer treatments such as surgery should a lung cancer be detected.  After review of the NCCN guidelines and recommendations for ongoing screening, the patient verbalized understanding of recommendations for " follow-up.  The patient has decided to proceed with a Low Dose Lung Cancer Screening CT today.      20 minutes face-to-face spent counseling patient on the continued health benefits of having quit tobacco, maintaining smoking abstinence, smoking cessation strategies and resources, including the 1-800-Quit-Now referenced in the AVS, as well as the importance of adherence to annual lung cancer low-dose CT screening.      The above risks/problems have been discussed with the patient.  Pertinent information has been shared with the patient in the After Visit Summary.      Return in about 1 year (around 9/8/2024) for Annual physical; schedule female well .    Electronically signed by Nani Adkins, HCANDU, APRN, 09/08/23, 2:26 PM CDT.

## 2023-09-10 LAB
BACTERIA UR CULT: NO GROWTH
BACTERIA UR CULT: NORMAL

## 2023-09-12 ENCOUNTER — TELEPHONE (OUTPATIENT)
Dept: FAMILY MEDICINE CLINIC | Facility: CLINIC | Age: 53
End: 2023-09-12
Payer: COMMERCIAL

## 2023-09-12 DIAGNOSIS — E66.01 MORBID (SEVERE) OBESITY DUE TO EXCESS CALORIES: Primary | ICD-10-CM

## 2023-09-12 RX ORDER — SEMAGLUTIDE 1.34 MG/ML
INJECTION, SOLUTION SUBCUTANEOUS
Qty: 3 ML | Refills: 1 | Status: SHIPPED | OUTPATIENT
Start: 2023-09-12 | End: 2023-10-24

## 2023-09-20 ENCOUNTER — HOSPITAL ENCOUNTER (OUTPATIENT)
Dept: CT IMAGING | Age: 53
Discharge: HOME | End: 2023-09-20
Payer: MEDICAID

## 2023-09-20 DIAGNOSIS — F17.210 CIGARETTE NICOTINE DEPENDENCE WITHOUT COMPLICATION: ICD-10-CM

## 2023-09-20 PROCEDURE — 71271 CT THORAX LUNG CANCER SCR C-: CPT

## 2023-09-21 ENCOUNTER — TELEPHONE (OUTPATIENT)
Dept: PULMONOLOGY | Age: 53
End: 2023-09-21

## 2023-09-21 NOTE — TELEPHONE ENCOUNTER
Patient confirmed appointment and completed prescreening questionnaire. Was told she would need to be here 30 minutes before appointment to register.

## 2023-09-22 ENCOUNTER — HOSPITAL ENCOUNTER (OUTPATIENT)
Dept: PULMONOLOGY | Age: 53
Discharge: HOME | End: 2023-09-22
Attending: INTERNAL MEDICINE
Payer: MEDICAID

## 2023-09-22 ENCOUNTER — HOSPITAL ENCOUNTER (OUTPATIENT)
Dept: NON INVASIVE DIAGNOSTICS | Age: 53
Discharge: HOME | End: 2023-09-22
Attending: INTERNAL MEDICINE
Payer: MEDICAID

## 2023-09-22 VITALS — OXYGEN SATURATION: 96 % | BODY MASS INDEX: 41.82 KG/M2 | HEIGHT: 63 IN | WEIGHT: 236 LBS

## 2023-09-22 DIAGNOSIS — R06.09 DOE (DYSPNEA ON EXERTION): ICD-10-CM

## 2023-09-22 DIAGNOSIS — Z86.16 HISTORY OF COVID-19: ICD-10-CM

## 2023-09-22 PROCEDURE — 94010 BREATHING CAPACITY TEST: CPT

## 2023-09-22 PROCEDURE — 93306 TTE W/DOPPLER COMPLETE: CPT

## 2023-09-22 RX ORDER — ALBUTEROL SULFATE 2.5 MG/3ML
2.5 SOLUTION RESPIRATORY (INHALATION) EVERY 6 HOURS PRN
Status: DISCONTINUED | OUTPATIENT
Start: 2023-09-22 | End: 2023-09-24 | Stop reason: HOSPADM

## 2023-09-22 NOTE — PROCEDURES
Media Information      Pulmonary Function Study    Interpretation:    The FVC is Normal. FEV1 is mildly reduced. FEV1/FVC ratio is reduced. Impression:    Mild chronic obstructive pulmonary disease.          Onur Gao MD, FCCP, [facility]

## 2023-09-27 ENCOUNTER — HOSPITAL ENCOUNTER (OUTPATIENT)
Dept: CT IMAGING | Facility: HOSPITAL | Age: 53
Discharge: HOME | End: 2023-09-27
Payer: COMMERCIAL

## 2023-09-27 ENCOUNTER — HOSPITAL ENCOUNTER (OUTPATIENT)
Dept: ULTRASOUND IMAGING | Facility: HOSPITAL | Age: 53
Discharge: HOME | End: 2023-09-27
Payer: COMMERCIAL

## 2023-09-27 DIAGNOSIS — Z72.0 TOBACCO USE: ICD-10-CM

## 2023-09-27 DIAGNOSIS — M79.605 LEG PAIN, LEFT: ICD-10-CM

## 2023-09-27 DIAGNOSIS — Z87.891 PERSONAL HISTORY OF TOBACCO USE, PRESENTING HAZARDS TO HEALTH: ICD-10-CM

## 2023-09-27 PROCEDURE — 93923 UPR/LXTR ART STDY 3+ LVLS: CPT

## 2023-09-27 PROCEDURE — 71271 CT THORAX LUNG CANCER SCR C-: CPT

## 2023-10-02 ENCOUNTER — PRE-ADMISSION TESTING (OUTPATIENT)
Dept: PREADMISSION TESTING | Facility: HOSPITAL | Age: 53
End: 2023-10-02
Payer: COMMERCIAL

## 2023-10-02 VITALS
BODY MASS INDEX: 40.35 KG/M2 | WEIGHT: 236.33 LBS | SYSTOLIC BLOOD PRESSURE: 134 MMHG | RESPIRATION RATE: 16 BRPM | HEIGHT: 64 IN | HEART RATE: 94 BPM | DIASTOLIC BLOOD PRESSURE: 89 MMHG | OXYGEN SATURATION: 94 %

## 2023-10-02 LAB
ANION GAP SERPL CALCULATED.3IONS-SCNC: 12 MMOL/L (ref 5–15)
BUN SERPL-MCNC: 10 MG/DL (ref 6–20)
BUN/CREAT SERPL: 17.5 (ref 7–25)
CALCIUM SPEC-SCNC: 9.6 MG/DL (ref 8.6–10.5)
CHLORIDE SERPL-SCNC: 100 MMOL/L (ref 98–107)
CO2 SERPL-SCNC: 28 MMOL/L (ref 22–29)
CREAT SERPL-MCNC: 0.57 MG/DL (ref 0.57–1)
DEPRECATED RDW RBC AUTO: 42.9 FL (ref 37–54)
EGFRCR SERPLBLD CKD-EPI 2021: 110.2 ML/MIN/1.73
ERYTHROCYTE [DISTWIDTH] IN BLOOD BY AUTOMATED COUNT: 13.1 % (ref 12.3–15.4)
GLUCOSE SERPL-MCNC: 123 MG/DL (ref 65–99)
HCT VFR BLD AUTO: 48.9 % (ref 34–46.6)
HGB BLD-MCNC: 16.5 G/DL (ref 12–15.9)
MCH RBC QN AUTO: 30.2 PG (ref 26.6–33)
MCHC RBC AUTO-ENTMCNC: 33.7 G/DL (ref 31.5–35.7)
MCV RBC AUTO: 89.4 FL (ref 79–97)
PLATELET # BLD AUTO: 257 10*3/MM3 (ref 140–450)
PMV BLD AUTO: 9.6 FL (ref 6–12)
POTASSIUM SERPL-SCNC: 3.6 MMOL/L (ref 3.5–5.2)
RBC # BLD AUTO: 5.47 10*6/MM3 (ref 3.77–5.28)
SODIUM SERPL-SCNC: 140 MMOL/L (ref 136–145)
WBC NRBC COR # BLD: 11.94 10*3/MM3 (ref 3.4–10.8)

## 2023-10-02 PROCEDURE — 93005 ELECTROCARDIOGRAM TRACING: CPT

## 2023-10-02 PROCEDURE — 85027 COMPLETE CBC AUTOMATED: CPT

## 2023-10-02 PROCEDURE — 80048 BASIC METABOLIC PNL TOTAL CA: CPT

## 2023-10-02 PROCEDURE — 36415 COLL VENOUS BLD VENIPUNCTURE: CPT

## 2023-10-02 NOTE — DISCHARGE INSTRUCTIONS
Before you come to the hospital        Arrival time: AS DIRECTED BY OFFICE     YOU MAY TAKE THE FOLLOWING MEDICATION(S) THE MORNING OF SURGERY WITH A SIP OF WATER: metoprolol    Hold ozempic for 1 week prior to surgery           ALL OTHER HOME MEDICATION CHECK WITH YOUR PHYSICIAN (especially if   you are taking diabetes medicines or blood thinners)    Do not take any Erectile Dysfunction medications (EX: CIALIS, VIAGRA) 24 hours prior to surgery.      If you were given and instructed to use a germ- killing soap, use as directed the night before surgery and again the morning of surgery or as directed by your surgeon. (Use one-half of the bottle with each shower.)   See attached information for How to Use Chlorhexidine for Bathing if applicable.            Eating and drinking restrictions prior to scheduled arrival time    2 Hours before arrival time STOP   Drinking Clear liquids (water, black coffee-NO CREAM,  apple juice-no pulp)      8 Hours before arrival time STOP   All food, full liquids, and dairy products    (It is extremely important that you follow these guidelines to prevent delay or cancelation of your procedure)     Clear Liquids  Water and flavored water                                                                      Clear Fruit juices, such as cranberry juice and apple juice.  Black coffee (NO cream of any kind, including powdered).  Plain tea  Clear bouillon or broth.  Flavored gelatin.  Soda.  Gatorade or Powerade.  Full liquid examples  Juices that have pulp.  Frozen ice pops that contain fruit pieces.  Coffee with creamer  Milk.  Yogurt.                MANAGING PAIN AFTER SURGERY    We know you are probably wondering what your pain will be like after surgery.  Following surgery it is unrealistic to expect you will not have pain.   Pain is how our bodies let us know that something is wrong or cautions us to be careful.  That said, our goal is to make your pain tolerable.    Methods we may use to  treat your pain include (oral or IV medications, PCAs, epidurals, nerve blocks, etc.)   While some procedures require IV pain medications for a short time after surgery, transitioning to pain medications by mouth allows for better management of pain.   Your nurse will encourage you to take oral pain medications whenever possible.  IV medications work almost immediately, but only last a short while.  Taking medications by mouth allows for a more constant level of medication in your blood stream for a longer period of time.      Once your pain is out of control it is harder to get back under control.  It is important you are aware when your next dose of pain medication is due.  If you are admitted, your nurse may write the time of your next dose on the white board in your room to help you remember.      We are interested in your pain and encourage you to inform us about aggravating factors during your visit.   Many times a simple repositioning every few hours can make a big difference.    If your physician says it is okay, do not let your pain prevent you from getting out of bed. Be sure to call your nurse for assistance prior to getting up so you do not fall.      Before surgery, please decide your tolerable pain goal.  These faces help describe the pain ratings we use on a 0-10 scale.   Be prepared to tell us your goal and whether or not you take pain or anxiety medications at home.          Preparing for Surgery  Preparing for surgery is an important part of your care. It can make things go more smoothly and help you avoid complications. The steps leading up to surgery may vary among hospitals. Follow all instructions given to you by your health care providers. Ask questions if you do not understand something. Talk about any concerns that you have.  Here are some questions to consider asking before your surgery:  If my surgery is not an emergency (is elective), when would be the best time to have the surgery?  What  arrangements do I need to make for work, home, or school?  What will my recovery be like? How long will it be before I can return to normal activities?  Will I need to prepare my home? Will I need to arrange care for me or my children?  Should I expect to have pain after surgery? What are my pain management options? Are there nonmedical options that I can try for pain?  Tell a health care provider about:  Any allergies you have.  All medicines you are taking, including vitamins, herbs, eye drops, creams, and over-the-counter medicines.  Any problems you or family members have had with anesthetic medicines.  Any blood disorders you have.  Any surgeries you have had.  Any medical conditions you have.  Whether you are pregnant or may be pregnant.  What are the risks?  The risks and complications of surgery depend on the specific procedure that you have. Discuss all the risks with your health care providers before your surgery. Ask about common surgical complications, which may include:  Infection.  Bleeding or a need for blood replacement (transfusion).  Allergic reactions to medicines.  Damage to surrounding nerves, tissues, or structures.  A blood clot.  Scarring.  Failure of the surgery to correct the problem.  Follow these instructions before the procedure:  Several days or weeks before your procedure  You may have a physical exam by your primary health care provider to make sure it is safe for you to have surgery.  You may have testing. This may include a chest X-ray, blood and urine tests, electrocardiogram (ECG), or other testing.  Ask your health care provider about:  Changing or stopping your regular medicines. This is especially important if you are taking diabetes medicines or blood thinners.  Taking medicines such as aspirin and ibuprofen. These medicines can thin your blood. Do not take these medicines unless your health care provider tells you to take them.  Taking over-the-counter medicines, vitamins,  herbs, and supplements.  Do not use any products that contain nicotine or tobacco, such as cigarettes and e-cigarettes. If you need help quitting, ask your health care provider.  Avoid alcohol.  Ask your health care provider if there are exercises you can do to prepare for surgery.  Eat a healthy diet.   Plan to have someone 18 years of age or older to take you home from the hospital. We will need to verify your ride on the morning of surgery if you are being discharged home on the same day. Tell your ride to be expecting a call from the hospital prior to your procedure.   Plan to have a responsible adult care for you for at least 24 hours after you leave the hospital or clinic. This is important.  The day before your procedure  You may be given antibiotic medicine to take by mouth to help prevent infection. Take it as told by your health care provider.  You may be asked to shower with a germ-killing soap.  Follow instructions from your health care provider about eating and drinking restrictions. This includes gum, mints and hard candy.  Pack comfortable clothes according to your procedure.   The day of your procedure  You may need to take another shower with a germ-killing soap before you leave home in the morning.  With a small sip of water, take only the medicines that you are told to take.  Remove all jewelry including rings.   Leave anything you consider valuable at home except hearing aids if needed.  You do not need to bring your home medications into the hospital.   Do not wear any makeup, nail polish, powder, deodorant, lotion, hair accessories, or anything on your skin or body except your clothes.  If you will be staying in the hospital, bring a case to hold your glasses, contacts, or dentures. You may also want to bring your robe and non-skid footwear.       (Do not use denture adhesives since you will be asked to remove them during  surgery).   If you wear oxygen at home, bring it with you the day of  surgery.  If instructed by your health care provider, bring your sleep apnea device with you on the day of your surgery (if this applies to you).  You may want to leave your suitcase and sleep apnea device in the car until after surgery.   Arrive at the hospital as scheduled.  Bring a friend or family member with you who can help to answer questions and be present while you meet with your health care provider.  At the hospital  When you arrive at the hospital:  Go to registration located at the main entrance of the hospital. You will be registered and given a beeper and a sticker sheet. Take the stickers to the Outpatient nurses desk and place in the black tray. This is to notify staff that you have arrived. Then return to the lobby to wait.   When your beeper lights up and vibrates proceed through the double doors, under the stairs, and a member of the Outpatient Surgery staff will escort you to your preoperative room.  You may have to wear compression sleeves. These help to prevent blood clots and reduce swelling in your legs.  An IV may be inserted into one of your veins.              In the operating room, you may be given one or more of the following:        A medicine to help you relax (sedative).        A medicine to numb the area (local anesthetic).        A medicine to make you fall asleep (general anesthetic).        A medicine that is injected into an area of your body to numb everything below the                      injection site (regional anesthetic).  You may be given an antibiotic through your IV to help prevent infection.  Your surgical site will be marked or identified.    Contact a health care provider if you:  Develop a fever of more than 100.4°F (38°C) or other feelings of illness during the 48 hours before your surgery.  Have symptoms that get worse.  Have questions or concerns about your surgery.  Summary  Preparing for surgery can make the procedure go more smoothly and lower your risk of  complications.  Before surgery, make a list of questions and concerns to discuss with your surgeon. Ask about the risks and possible complications.  In the days or weeks before your surgery, follow all instructions from your health care provider. You may need to stop smoking, avoid alcohol, follow eating restrictions, and change or stop your regular medicines.  Contact your surgeon if you develop a fever or other signs of illness during the few days before your surgery.  This information is not intended to replace advice given to you by your health care provider. Make sure you discuss any questions you have with your health care provider.  Document Revised: 12/21/2018 Document Reviewed: 10/23/2018  Elsevier Patient Education © 2021 [employer]

## 2023-10-03 LAB
QT INTERVAL: 374 MS
QTC INTERVAL: 450 MS

## 2023-10-06 ENCOUNTER — TELEPHONE (OUTPATIENT)
Dept: FAMILY MEDICINE CLINIC | Facility: CLINIC | Age: 53
End: 2023-10-06
Payer: COMMERCIAL

## 2023-10-06 ENCOUNTER — TELEPHONE (OUTPATIENT)
Dept: UROLOGY | Facility: CLINIC | Age: 53
End: 2023-10-06
Payer: COMMERCIAL

## 2023-10-06 DIAGNOSIS — M79.605 PAIN IN BOTH LOWER EXTREMITIES: Primary | ICD-10-CM

## 2023-10-06 DIAGNOSIS — M79.604 PAIN IN BOTH LOWER EXTREMITIES: Primary | ICD-10-CM

## 2023-10-06 NOTE — TELEPHONE ENCOUNTER
Called patient to remind them to arrive at patient registration on 10/9 at 6AM for the procedure with Dr. Wall. Spoke with patient. Told patient if they had any questions to please contact our office at [phone].

## 2023-10-06 NOTE — TELEPHONE ENCOUNTER
Called and notified pt of US results, see result note. Pt is agreeable to see Vascular, referral pended.

## 2023-10-09 ENCOUNTER — ANESTHESIA EVENT (OUTPATIENT)
Dept: PERIOP | Facility: HOSPITAL | Age: 53
End: 2023-10-09
Payer: COMMERCIAL

## 2023-10-09 ENCOUNTER — ANESTHESIA (OUTPATIENT)
Dept: PERIOP | Facility: HOSPITAL | Age: 53
End: 2023-10-09
Payer: COMMERCIAL

## 2023-10-09 ENCOUNTER — HOSPITAL ENCOUNTER (OUTPATIENT)
Facility: HOSPITAL | Age: 53
Setting detail: HOSPITAL OUTPATIENT SURGERY
Discharge: HOME | End: 2023-10-09
Attending: UROLOGY | Admitting: UROLOGY
Payer: COMMERCIAL

## 2023-10-09 ENCOUNTER — OFFICE VISIT (OUTPATIENT)
Dept: FAMILY MEDICINE CLINIC | Facility: CLINIC | Age: 53
End: 2023-10-09
Payer: COMMERCIAL

## 2023-10-09 VITALS
HEART RATE: 82 BPM | TEMPERATURE: 97.5 F | SYSTOLIC BLOOD PRESSURE: 108 MMHG | DIASTOLIC BLOOD PRESSURE: 82 MMHG | RESPIRATION RATE: 16 BRPM | OXYGEN SATURATION: 94 %

## 2023-10-09 VITALS
WEIGHT: 236 LBS | SYSTOLIC BLOOD PRESSURE: 130 MMHG | OXYGEN SATURATION: 99 % | DIASTOLIC BLOOD PRESSURE: 62 MMHG | RESPIRATION RATE: 18 BRPM | HEIGHT: 64 IN | BODY MASS INDEX: 40.29 KG/M2 | HEART RATE: 96 BPM | TEMPERATURE: 98.6 F

## 2023-10-09 DIAGNOSIS — B37.9 CANDIDA INFECTION: Primary | ICD-10-CM

## 2023-10-09 DIAGNOSIS — N39.3 STRESS INCONTINENCE (FEMALE) (MALE): ICD-10-CM

## 2023-10-09 LAB
GLUCOSE BLDC GLUCOMTR-MCNC: 108 MG/DL (ref 70–130)
GLUCOSE BLDC GLUCOMTR-MCNC: 111 MG/DL (ref 70–130)

## 2023-10-09 PROCEDURE — 25010000002 NALOXONE PER 1 MG

## 2023-10-09 PROCEDURE — 25010000002 FENTANYL CITRATE (PF) 100 MCG/2ML SOLUTION

## 2023-10-09 PROCEDURE — 25010000002 DEXAMETHASONE PER 1 MG

## 2023-10-09 PROCEDURE — 99213 OFFICE O/P EST LOW 20 MIN: CPT | Performed by: NURSE PRACTITIONER

## 2023-10-09 PROCEDURE — 3078F DIAST BP <80 MM HG: CPT | Performed by: NURSE PRACTITIONER

## 2023-10-09 PROCEDURE — 82948 REAGENT STRIP/BLOOD GLUCOSE: CPT | Performed by: NURSE PRACTITIONER

## 2023-10-09 PROCEDURE — 25810000003 LACTATED RINGERS PER 1000 ML: Performed by: UROLOGY

## 2023-10-09 PROCEDURE — 25010000002 DEXAMETHASONE PER 1 MG: Performed by: ANESTHESIOLOGY

## 2023-10-09 PROCEDURE — 25010000002 CEFAZOLIN PER 500 MG: Performed by: UROLOGY

## 2023-10-09 PROCEDURE — 25010000002 PROPOFOL 10 MG/ML EMULSION

## 2023-10-09 PROCEDURE — 82948 REAGENT STRIP/BLOOD GLUCOSE: CPT

## 2023-10-09 PROCEDURE — L8606 SYNTHETIC IMPLNT URINARY 1ML: HCPCS | Performed by: UROLOGY

## 2023-10-09 PROCEDURE — 3044F HG A1C LEVEL LT 7.0%: CPT | Performed by: NURSE PRACTITIONER

## 2023-10-09 PROCEDURE — 25010000002 ONDANSETRON PER 1 MG

## 2023-10-09 PROCEDURE — 3075F SYST BP GE 130 - 139MM HG: CPT | Performed by: NURSE PRACTITIONER

## 2023-10-09 DEVICE — BULKAMID URETHRAL BULKING SYSTEM 2ML
Type: IMPLANTABLE DEVICE | Site: BLADDER | Status: FUNCTIONAL
Brand: BULKAMID

## 2023-10-09 RX ORDER — LIDOCAINE HYDROCHLORIDE 20 MG/ML
INJECTION, SOLUTION EPIDURAL; INFILTRATION; INTRACAUDAL; PERINEURAL AS NEEDED
Status: DISCONTINUED | OUTPATIENT
Start: 2023-10-09 | End: 2023-10-09 | Stop reason: SURG

## 2023-10-09 RX ORDER — NALOXONE HYDROCHLORIDE 0.4 MG/ML
INJECTION, SOLUTION INTRAMUSCULAR; INTRAVENOUS; SUBCUTANEOUS AS NEEDED
Status: DISCONTINUED | OUTPATIENT
Start: 2023-10-09 | End: 2023-10-09 | Stop reason: SURG

## 2023-10-09 RX ORDER — ONDANSETRON 2 MG/ML
4 INJECTION INTRAMUSCULAR; INTRAVENOUS ONCE AS NEEDED
Status: DISCONTINUED | OUTPATIENT
Start: 2023-10-09 | End: 2023-10-09 | Stop reason: HOSPADM

## 2023-10-09 RX ORDER — SODIUM CHLORIDE 0.9 % (FLUSH) 0.9 %
3 SYRINGE (ML) INJECTION EVERY 12 HOURS SCHEDULED
Status: DISCONTINUED | OUTPATIENT
Start: 2023-10-09 | End: 2023-10-09 | Stop reason: HOSPADM

## 2023-10-09 RX ORDER — HYDROCODONE BITARTRATE AND ACETAMINOPHEN 5; 325 MG/1; MG/1
1 TABLET ORAL ONCE AS NEEDED
Status: DISCONTINUED | OUTPATIENT
Start: 2023-10-09 | End: 2023-10-09 | Stop reason: HOSPADM

## 2023-10-09 RX ORDER — SODIUM CHLORIDE 0.9 % (FLUSH) 0.9 %
3-10 SYRINGE (ML) INJECTION AS NEEDED
Status: DISCONTINUED | OUTPATIENT
Start: 2023-10-09 | End: 2023-10-09 | Stop reason: HOSPADM

## 2023-10-09 RX ORDER — FLUMAZENIL 0.1 MG/ML
0.2 INJECTION INTRAVENOUS AS NEEDED
Status: DISCONTINUED | OUTPATIENT
Start: 2023-10-09 | End: 2023-10-09 | Stop reason: HOSPADM

## 2023-10-09 RX ORDER — SODIUM CHLORIDE 9 MG/ML
40 INJECTION, SOLUTION INTRAVENOUS AS NEEDED
Status: DISCONTINUED | OUTPATIENT
Start: 2023-10-09 | End: 2023-10-09 | Stop reason: HOSPADM

## 2023-10-09 RX ORDER — HYDROCODONE BITARTRATE AND ACETAMINOPHEN 10; 325 MG/1; MG/1
1 TABLET ORAL EVERY 4 HOURS PRN
Status: DISCONTINUED | OUTPATIENT
Start: 2023-10-09 | End: 2023-10-09 | Stop reason: HOSPADM

## 2023-10-09 RX ORDER — DROPERIDOL 2.5 MG/ML
0.62 INJECTION, SOLUTION INTRAMUSCULAR; INTRAVENOUS ONCE AS NEEDED
Status: DISCONTINUED | OUTPATIENT
Start: 2023-10-09 | End: 2023-10-09 | Stop reason: HOSPADM

## 2023-10-09 RX ORDER — MAGNESIUM HYDROXIDE 1200 MG/15ML
LIQUID ORAL AS NEEDED
Status: DISCONTINUED | OUTPATIENT
Start: 2023-10-09 | End: 2023-10-09 | Stop reason: HOSPADM

## 2023-10-09 RX ORDER — SODIUM CHLORIDE 0.9 % (FLUSH) 0.9 %
3 SYRINGE (ML) INJECTION AS NEEDED
Status: DISCONTINUED | OUTPATIENT
Start: 2023-10-09 | End: 2023-10-09 | Stop reason: HOSPADM

## 2023-10-09 RX ORDER — SUCCINYLCHOLINE/SOD CL,ISO/PF 200MG/10ML
SYRINGE (ML) INTRAVENOUS AS NEEDED
Status: DISCONTINUED | OUTPATIENT
Start: 2023-10-09 | End: 2023-10-09 | Stop reason: SURG

## 2023-10-09 RX ORDER — PROPOFOL 10 MG/ML
VIAL (ML) INTRAVENOUS AS NEEDED
Status: DISCONTINUED | OUTPATIENT
Start: 2023-10-09 | End: 2023-10-09 | Stop reason: SURG

## 2023-10-09 RX ORDER — DEXAMETHASONE SODIUM PHOSPHATE 4 MG/ML
INJECTION, SOLUTION INTRA-ARTICULAR; INTRALESIONAL; INTRAMUSCULAR; INTRAVENOUS; SOFT TISSUE AS NEEDED
Status: DISCONTINUED | OUTPATIENT
Start: 2023-10-09 | End: 2023-10-09 | Stop reason: SURG

## 2023-10-09 RX ORDER — FENTANYL CITRATE 50 UG/ML
INJECTION, SOLUTION INTRAMUSCULAR; INTRAVENOUS AS NEEDED
Status: DISCONTINUED | OUTPATIENT
Start: 2023-10-09 | End: 2023-10-09 | Stop reason: SURG

## 2023-10-09 RX ORDER — LABETALOL HYDROCHLORIDE 5 MG/ML
5 INJECTION, SOLUTION INTRAVENOUS
Status: DISCONTINUED | OUTPATIENT
Start: 2023-10-09 | End: 2023-10-09 | Stop reason: HOSPADM

## 2023-10-09 RX ORDER — ONDANSETRON 2 MG/ML
INJECTION INTRAMUSCULAR; INTRAVENOUS AS NEEDED
Status: DISCONTINUED | OUTPATIENT
Start: 2023-10-09 | End: 2023-10-09 | Stop reason: SURG

## 2023-10-09 RX ORDER — HYDROCODONE BITARTRATE AND ACETAMINOPHEN 5; 325 MG/1; MG/1
1 TABLET ORAL EVERY 8 HOURS PRN
Qty: 6 TABLET | Refills: 0 | Status: SHIPPED | OUTPATIENT
Start: 2023-10-09

## 2023-10-09 RX ORDER — NALOXONE HCL 0.4 MG/ML
0.4 VIAL (ML) INJECTION AS NEEDED
Status: DISCONTINUED | OUTPATIENT
Start: 2023-10-09 | End: 2023-10-09 | Stop reason: HOSPADM

## 2023-10-09 RX ORDER — IPRATROPIUM BROMIDE AND ALBUTEROL SULFATE 2.5; .5 MG/3ML; MG/3ML
3 SOLUTION RESPIRATORY (INHALATION) ONCE
Status: COMPLETED | OUTPATIENT
Start: 2023-10-09 | End: 2023-10-09

## 2023-10-09 RX ORDER — LIDOCAINE HYDROCHLORIDE 10 MG/ML
0.5 INJECTION, SOLUTION EPIDURAL; INFILTRATION; INTRACAUDAL; PERINEURAL ONCE AS NEEDED
Status: DISCONTINUED | OUTPATIENT
Start: 2023-10-09 | End: 2023-10-09 | Stop reason: HOSPADM

## 2023-10-09 RX ORDER — SODIUM CHLORIDE, SODIUM LACTATE, POTASSIUM CHLORIDE, CALCIUM CHLORIDE 600; 310; 30; 20 MG/100ML; MG/100ML; MG/100ML; MG/100ML
1000 INJECTION, SOLUTION INTRAVENOUS CONTINUOUS
Status: DISCONTINUED | OUTPATIENT
Start: 2023-10-09 | End: 2023-10-09 | Stop reason: HOSPADM

## 2023-10-09 RX ORDER — SODIUM CHLORIDE, SODIUM LACTATE, POTASSIUM CHLORIDE, CALCIUM CHLORIDE 600; 310; 30; 20 MG/100ML; MG/100ML; MG/100ML; MG/100ML
100 INJECTION, SOLUTION INTRAVENOUS CONTINUOUS
Status: DISCONTINUED | OUTPATIENT
Start: 2023-10-09 | End: 2023-10-09 | Stop reason: HOSPADM

## 2023-10-09 RX ORDER — LIDOCAINE HYDROCHLORIDE 40 MG/ML
SOLUTION TOPICAL AS NEEDED
Status: DISCONTINUED | OUTPATIENT
Start: 2023-10-09 | End: 2023-10-09 | Stop reason: SURG

## 2023-10-09 RX ORDER — FLUCONAZOLE 150 MG/1
150 TABLET ORAL ONCE
Qty: 1 TABLET | Refills: 0 | Status: SHIPPED | OUTPATIENT
Start: 2023-10-09 | End: 2023-10-09

## 2023-10-09 RX ORDER — DEXAMETHASONE SODIUM PHOSPHATE 4 MG/ML
4 INJECTION, SOLUTION INTRA-ARTICULAR; INTRALESIONAL; INTRAMUSCULAR; INTRAVENOUS; SOFT TISSUE ONCE AS NEEDED
Status: COMPLETED | OUTPATIENT
Start: 2023-10-09 | End: 2023-10-09

## 2023-10-09 RX ORDER — FENTANYL CITRATE 50 UG/ML
25 INJECTION, SOLUTION INTRAMUSCULAR; INTRAVENOUS
Status: DISCONTINUED | OUTPATIENT
Start: 2023-10-09 | End: 2023-10-09 | Stop reason: HOSPADM

## 2023-10-09 RX ADMIN — DEXAMETHASONE SODIUM PHOSPHATE 4 MG: 4 INJECTION INTRA-ARTICULAR; INTRALESIONAL; INTRAMUSCULAR; INTRAVENOUS; SOFT TISSUE at 07:54

## 2023-10-09 RX ADMIN — IPRATROPIUM BROMIDE AND ALBUTEROL SULFATE 3 ML: .5; 3 SOLUTION RESPIRATORY (INHALATION) at 09:29

## 2023-10-09 RX ADMIN — LIDOCAINE HYDROCHLORIDE 100 MG: 20 INJECTION, SOLUTION EPIDURAL; INFILTRATION; INTRACAUDAL; PERINEURAL at 08:19

## 2023-10-09 RX ADMIN — PROPOFOL 200 MG: 10 INJECTION, EMULSION INTRAVENOUS at 08:31

## 2023-10-09 RX ADMIN — NALOXONE HYDROCHLORIDE 0.12 MG: 0.4 INJECTION, SOLUTION INTRAMUSCULAR; INTRAVENOUS; SUBCUTANEOUS at 08:48

## 2023-10-09 RX ADMIN — PROPOFOL 200 MG: 10 INJECTION, EMULSION INTRAVENOUS at 08:19

## 2023-10-09 RX ADMIN — FENTANYL CITRATE 100 MCG: 50 INJECTION, SOLUTION INTRAMUSCULAR; INTRAVENOUS at 08:21

## 2023-10-09 RX ADMIN — ONDANSETRON 4 MG: 2 INJECTION INTRAMUSCULAR; INTRAVENOUS at 07:57

## 2023-10-09 RX ADMIN — Medication 140 MG: at 08:19

## 2023-10-09 RX ADMIN — DEXAMETHASONE SODIUM PHOSPHATE 4 MG: 4 INJECTION, SOLUTION INTRA-ARTICULAR; INTRALESIONAL; INTRAMUSCULAR; INTRAVENOUS; SOFT TISSUE at 07:57

## 2023-10-09 RX ADMIN — LIDOCAINE HYDROCHLORIDE 1 EACH: 40 SOLUTION TOPICAL at 08:19

## 2023-10-09 RX ADMIN — SODIUM CHLORIDE, POTASSIUM CHLORIDE, SODIUM LACTATE AND CALCIUM CHLORIDE 1000 ML: 600; 310; 30; 20 INJECTION, SOLUTION INTRAVENOUS at 06:54

## 2023-10-09 RX ADMIN — CEFAZOLIN 2 G: 2 INJECTION, POWDER, FOR SOLUTION INTRAMUSCULAR; INTRAVENOUS at 08:24

## 2023-10-09 NOTE — H&P
Urology H&P    Ms. Rubio is 53 y.o. female    CHIEF COMPLAINT: I leak urine    HPI  The patient is here today to address the stress component of her incontinence.  She reports no dysuria or other worrisome symptoms of infection    The following portions of the patient's history were reviewed and updated as appropriate: allergies, current medications, past family history, past medical history, past social history, past surgical history and problem list.    Review of Systems      Medications Prior to Admission   Medication Sig Dispense Refill Last Dose    albuterol (PROVENTIL) (2.5 MG/3ML) 0.083% nebulizer solution USE 1 VIAL IN NEBULIZER EVERY 4 HOURS AS NEEDED   Past Month    albuterol sulfate  (90 Base) MCG/ACT inhaler Inhale 2 puffs Every 4 (Four) Hours As Needed for Wheezing or Shortness of Air. 18 g 2 Past Week    atorvastatin (LIPITOR) 40 MG tablet Take 1 tablet by mouth Every Night. 90 tablet 0 10/8/2023 at 2000    budesonide-formoterol (SYMBICORT) 80-4.5 MCG/ACT inhaler Take  by mouth Every 6 (Six) Hours.   Past Week    buPROPion SR (Wellbutrin SR) 150 MG 12 hr tablet Take 1 tablet by mouth 2 (Two) Times a Day for 90 days. Take 150mg daily for 3 days, then 150mg 2 times daily 180 tablet 0 Past Month    chlorthalidone (HYGROTON) 25 MG tablet Take 1 tablet by mouth Daily. 90 tablet 1 10/8/2023 at 0800    levocetirizine (XYZAL) 5 MG tablet Take 1 tablet by mouth Every Evening. 90 tablet 0 Past Month    metoprolol succinate XL (TOPROL-XL) 50 MG 24 hr tablet Take 1 tablet by mouth Daily. 90 tablet 3 10/9/2023 at 0300    O2 (OXYGEN) Inhale 2 L/min 1 (One) Time. AS NEEDED, MOSTLY THROUGH NIGHT   10/9/2023 at 0300    Blood Glucose Monitoring Suppl (OneTouch Verio Flex System) w/Device kit Test blood glucose level 1-2 times per day [id number]       ergocalciferol (ERGOCALCIFEROL) 1.25 MG (05193 UT) capsule Take 1 capsule by mouth 2 (Two) Times a Week. 12 capsule 5 10/6/2023    famotidine (PEPCID) 20 MG tablet  Take 1 tablet by mouth 2 (Two) Times a Day. 180 tablet 0     ibuprofen (ADVIL,MOTRIN) 600 MG tablet Take 1 tablet by mouth Every 6 (Six) Hours As Needed for Mild Pain. 90 tablet 0 More than a month    Lancets (OneTouch Delica Plus [id number]) misc 1 each by Other route Daily. 100 each 5     OneTouch Verio test strip Check FBS daily and prn for Diabetes 100 each 5     oxybutynin XL (DITROPAN-XL) 10 MG 24 hr tablet Take 1 tablet by mouth Daily.   More than a month    pantoprazole (PROTONIX) 40 MG EC tablet Take 1 tablet by mouth Daily. 90 tablet 1     Semaglutide,0.25 or 0.5MG/DOS, (Ozempic, 0.25 or 0.5 MG/DOSE,) 2 MG/1.5ML solution pen-injector Inject 0.25 mg under the skin into the appropriate area as directed 1 (One) Time Per Week for 28 days, THEN 0.5 mg 1 (One) Time Per Week for 14 days. 3 mL 1 9/29/2023    Spiriva Respimat 1.25 MCG/ACT aerosol solution inhaler Inhale 2 puffs Daily. 4 g 2 10/6/2023    tiZANidine (ZANAFLEX) 4 MG tablet Take 1 tablet by mouth At Night As Needed for Muscle Spasms. 90 tablet 0          Current Facility-Administered Medications:     ceFAZolin 2000 mg IVPB in 100 mL NS (MBP), 2 g, Intravenous, Once, Octaviano Wall MD    lactated ringers infusion 1,000 mL, 1,000 mL, Intravenous, Continuous, Octaviano Wall MD, Last Rate: 25 mL/hr at 10/09/23 0654, 1,000 mL at 10/09/23 0654    lidocaine PF 1% (XYLOCAINE) injection 0.5 mL, 0.5 mL, Intradermal, Once PRN, Octaviano Wall MD    sodium chloride 0.9 % flush 3 mL, 3 mL, Intravenous, PRN, Octaviano Wall MD    Past Medical History:   Diagnosis Date    Asthma 2020    Covid and COPD    COPD (chronic obstructive pulmonary disease)     COVID-19     CPAP (continuous positive airway pressure) dependence     Diabetes mellitus     GERD (gastroesophageal reflux disease) 2020    Hyperlipidemia     Hypertension     Sleep apnea     cpap with o2    Urinary tract infection        Past Surgical History:   Procedure Laterality Date    BREAST  AUGMENTATION      COLONOSCOPY N/A 5/30/2023    Procedure: COLONOSCOPY WITH ANESTHESIA;  Surgeon: Christopher Freeman MD;  Location: [facility] ENDOSCOPY;  Service: Gastroenterology;  Laterality: N/A;  preop; epigastric pain   postop polyp  PCP Nani denis    ENDOSCOPY N/A 5/30/2023    Procedure: ESOPHAGOGASTRODUODENOSCOPY WITH ANESTHESIA;  Surgeon: Christopher Freeman MD;  Location: [facility] ENDOSCOPY;  Service: Gastroenterology;  Laterality: N/A;  preop; epigastric pain  postop normal  PCP Nani denis    LAPAROSCOPIC CHOLECYSTECTOMY      TUBAL ABDOMINAL LIGATION         Social History     Socioeconomic History    Marital status:    Tobacco Use    Smoking status: Every Day     Packs/day: 1.00     Years: 35.00     Additional pack years: 0.00     Total pack years: 35.00     Types: Cigarettes     Passive exposure: Past    Smokeless tobacco: Never   Vaping Use    Vaping Use: Never used   Substance and Sexual Activity    Alcohol use: Never    Drug use: Never    Sexual activity: Defer     Comment: Menopause       Family History   Problem Relation Age of Onset    Heart failure Mother     Diabetes Mother         2000    Lung cancer Mother     Heart disease Mother     Cancer Mother         Lung    COPD Mother     Hypertension Mother     Heart attack Father     Heart disease Father     Diabetes Father         2010    Colon cancer Father     Cancer Father         Colon    Hyperlipidemia Father     Stroke Father     Hypertension Father     Alcohol abuse Sister     Cancer Brother     Alcohol abuse Brother     Drug abuse Brother         1998       /75 (BP Location: Left arm, Patient Position: Sitting)   Pulse 85   Temp 97.9 øF (36.6 øC) (Temporal)   Resp 18   LMP  (LMP Unknown) Comment: lmp January  SpO2 94%   Breastfeeding No     Physical Exam      Lab Results   Component Value Date    GLUCOSE 123 (H) 10/02/2023    BUN 10 10/02/2023    CREATININE 0.57 10/02/2023    EGFRIFNONA >60 05/27/2022    EGFRIFAFRI >60  "05/27/2022    BCR 17.5 10/02/2023    CO2 28.0 10/02/2023    CALCIUM 9.6 10/02/2023    PROTENTOTREF 7.2 06/30/2023    ALBUMIN 4.6 06/30/2023    LABIL2 1.8 06/30/2023    AST 37 (H) 06/30/2023    ALT 55 (H) 06/30/2023     Lab Results   Component Value Date    GLUCOSE 123 (H) 10/02/2023    CALCIUM 9.6 10/02/2023     10/02/2023    K 3.6 10/02/2023    CO2 28.0 10/02/2023     10/02/2023    BUN 10 10/02/2023    CREATININE 0.57 10/02/2023    EGFRIFAFRI >60 05/27/2022    EGFRIFNONA >60 05/27/2022    BCR 17.5 10/02/2023    ANIONGAP 12.0 10/02/2023     Lab Results   Component Value Date    WBC 11.94 (H) 10/02/2023    HGB 16.5 (H) 10/02/2023    HCT 48.9 (H) 10/02/2023    MCV 89.4 10/02/2023     10/02/2023     No results found for: \"PSA\"  No results found for: \"URINECX\"  Brief Urine Lab Results  (Last result in the past 365 days)        Color   Clarity   Blood   Leuk Est   Nitrite   Protein   CREAT   Urine HCG        09/08/23 1339 Yellow   Slightly Cloudy   Trace   Negative   Negative   Negative                     Imaging Results (Last 7 Days)       ** No results found for the last 168 hours. **            Assessment and Plan  Incontinence, stress    Patient is here to undergo treatment for urinary stress incontinence.  We will do a Bulkamid injection.  Reviewed the rationale, alternative options, benefits and risks of this procedure were discussed at previous office visit.  Patient voiced no additional questions today.      (Please note that portions of this note were completed with a voice recognition program.)  Octaviano Wall MD  10/09/23  07:40 CDT             "

## 2023-10-09 NOTE — PROGRESS NOTES
"Chief Complaint  Rash    Subjective        Anastasiia Rubio presents to [facility]  Rash    The patient presents to the office today for a rash evaluation. The rash is located on the right side of her groin between the groin and the fat fold. She recently had surgery for stress incontinence. The rash also itches.  She denies any fevers or chills. Her blood pressure is 154/103 mmHg. The patient has not taken any of her medication today.     Objective   Vital Signs:  /62 (BP Location: Left arm, Patient Position: Sitting, Cuff Size: Large Adult)   Pulse 96   Temp 98.6 øF (37 øC) (Infrared)   Resp 18   Ht 162.5 cm (63.98\") Comment: per patient  Wt 107 kg (236 lb)   SpO2 99%   BMI 40.53 kg/mý   Estimated body mass index is 40.53 kg/mý as calculated from the following:    Height as of this encounter: 162.5 cm (63.98\").    Weight as of this encounter: 107 kg (236 lb).         The following portions of the patient's history were reviewed and updated as appropriate: allergies, current medications, past family history, past medical history, past social history, past surgical history and problem list.    Class 3 Severe Obesity (BMI >=40). Obesity-related health conditions include the following: hypertension and dyslipidemias. Obesity is worsening. BMI is is above average; BMI management plan is completed. We discussed portion control and increasing exercise.       Physical Exam  Vitals and nursing note reviewed.   Constitutional:       General: She is awake.      Appearance: Normal appearance. She is well-developed and well-groomed.   HENT:      Head: Normocephalic and atraumatic.      Right Ear: Hearing, tympanic membrane, ear canal and external ear normal.      Left Ear: Hearing, tympanic membrane, ear canal and external ear normal.      Nose: Nose normal.      Mouth/Throat:      Lips: Pink.      Pharynx: Oropharynx is clear.   Eyes:      General: Lids are normal.      " Conjunctiva/sclera: Conjunctivae normal.   Cardiovascular:      Rate and Rhythm: Normal rate and regular rhythm.      Heart sounds: Normal heart sounds.   Pulmonary:      Effort: Pulmonary effort is normal.      Breath sounds: Normal breath sounds and air entry.   Musculoskeletal:      Cervical back: Full passive range of motion without pain.      Right lower leg: No edema.      Left lower leg: No edema.   Lymphadenopathy:      Head:      Right side of head: No submental, submandibular or tonsillar adenopathy.      Left side of head: No submental, submandibular or tonsillar adenopathy.   Skin:     General: Skin is warm and dry.   Neurological:      Mental Status: She is alert.      Sensory: Sensation is intact.      Motor: Motor function is intact.      Coordination: Coordination is intact.      Gait: Gait is intact.   Psychiatric:         Attention and Perception: Attention and perception normal.         Mood and Affect: Mood and affect normal.         Speech: Speech normal.         Behavior: Behavior normal. Behavior is cooperative.         Thought Content: Thought content normal.         Cognition and Memory: Cognition and memory normal.         Judgment: Judgment normal.        Result Review :                   Assessment and Plan   Diagnoses and all orders for this visit:    1. Candida infection (Primary)  -     nystatin-zinc oxide; Apply to affected area twice daily  Dispense: 60 g; Refill: 1  -     fluconazole (Diflucan) 150 MG tablet; Take 1 tablet by mouth 1 (One) Time for 1 dose.  Dispense: 1 tablet; Refill: 0      The patient will follow up in 1 week.       Follow Up   Return in about 1 week (around 10/16/2023), or if symptoms worsen or fail to improve.  Patient was given instructions and counseling regarding her condition or for health maintenance advice. Please see specific information pulled into the AVS if appropriate.     Transcribed from ambient dictation for Nani Adkins, DNP, APRN by Marissa  Mingo.  10/09/23   12:43 CDT    Patient or patient representative verbalized consent to the visit recording.  I have personally performed the services described in this document as transcribed by the above individual, and it is both accurate and complete.     Electronically signed by Nani Adkins DNP, APRN, 10/09/23, 3:05 PM CDT.

## 2023-10-09 NOTE — OP NOTE
"Operative Summary    Anastasiia Rubio  Date of Procedure: 10/9/2023    Pre-op Diagnosis:   Stress incontinence (female) (male) [N39.3]    Post-op Diagnosis:     Post-Op Diagnosis Codes:     * Stress incontinence (female) (male) [N39.3]    Procedure/CPTr Codes:      Procedure(s):  CYSTOSCOPY WITH BULKING AGENT INJECTION    Surgeon(s):  Octaviano Wall MD    Anesthesia: General    Staff:   Circulator: James Ceballos RN  Scrub Person: Missy Bird; Stephany Lozano; Tiffani Vaca    Indications for procedure:  Stress incontinence    Findings:   Satisfactory coaptation of urethral \"blebs\" in proximal urethra at conclusion of case    Procedure details:  After appropriate anesthesia, positioning, prep and drape, timeout protocol was observed.     Patient is in the dorsal lithotomy position.  The 0 degree urethroscope is inserted through the rotating sheath. The bladder is filled with about 150 mL of sterile water through the cystoscope sheath.  Inside the bladder this sheath is rotated to where the needle will be passed at the 7 o'clock position after the first 1 mL Bulkamid syringe with needle is passed through the rotating sheath.  The needle was left 2 cm out of the sheath.  At this point the entire system is pulled distally so that the tip of the needle was now at the bladder neck.  At this point the needle was retracted back into the sheath at the 7 o'clock position.  The needle is then passed parallel to the scope and urethra until the needle is seen perforating the urethra to 1 cm being in the appropriate layer to inject the hydrogel.  I injected approximately 0.3 mL at which point I could see the bleb created approaching in the midline.  After retracting the needle inside the sheath, I rotated the sheath to the 11 o'clock position in a clockwise fashion.  In a like manner I injected the hydrogel until I saw the bleb across the midline.  Each time the needle was then retracted and rotated to the next injection " site which was 2 o'clock and 5 o'clock positions. The latter 2 injections were completed with another 1 mL Bulkamid syringe. I used 2 mL total.  Hydrogel appeared to be nicely coapting the urothelium so that I could not even see into the bladder.  At this point I took an 8 Divehi red rubber catheter and drained out approximately 300 mL sterile water to leave the patient with about 300 mL to void postoperatively.    Estimated Blood Loss: <30 mL    Specimens:                None      Drains: none    Complications: none    Plan: Patient to return to see me in office in six weeks provided voiding trial is successful in outpatient surgery      (Please note that portions of this note were completed with a voice recognition program.)  Octaviano Wall MD     Date: 10/9/2023  Time: 08:46 CDT

## 2023-10-09 NOTE — PROGRESS NOTES
"Chief Complaint  Rash    Subjective    {Problem List  Visit Diagnosis   Encounters  Notes  Medications  Labs  Result Review Imaging  Media :23}    Anastasiia Rubio presents to [facility]  History of Present Illness    Objective   Vital Signs:  BP (!) 154/103 (BP Location: Left arm, Patient Position: Sitting, Cuff Size: Large Adult)   Pulse 96   Temp 98.6 øF (37 øC) (Infrared)   Resp 18   Ht 162.5 cm (63.98\") Comment: per patient  Wt 107 kg (236 lb)   SpO2 99%   BMI 40.53 kg/mý   Estimated body mass index is 40.53 kg/mý as calculated from the following:    Height as of this encounter: 162.5 cm (63.98\").    Weight as of this encounter: 107 kg (236 lb).               Physical Exam  Vitals and nursing note reviewed.   Constitutional:       General: She is awake.      Appearance: Normal appearance. She is well-developed and well-groomed.   HENT:      Head: Normocephalic and atraumatic.      Right Ear: Hearing, tympanic membrane, ear canal and external ear normal.      Left Ear: Hearing, tympanic membrane, ear canal and external ear normal.      Nose: Nose normal.      Mouth/Throat:      Lips: Pink.      Pharynx: Oropharynx is clear.   Eyes:      General: Lids are normal.      Conjunctiva/sclera: Conjunctivae normal.   Cardiovascular:      Rate and Rhythm: Normal rate and regular rhythm.      Heart sounds: Normal heart sounds.   Pulmonary:      Effort: Pulmonary effort is normal.      Breath sounds: Normal breath sounds and air entry.   Musculoskeletal:      Cervical back: Full passive range of motion without pain.      Right lower leg: No edema.      Left lower leg: No edema.   Lymphadenopathy:      Head:      Right side of head: No submental, submandibular or tonsillar adenopathy.      Left side of head: No submental, submandibular or tonsillar adenopathy.   Skin:     General: Skin is warm and dry.   Neurological:      Mental Status: She is alert.      Sensory: Sensation is " intact.      Motor: Motor function is intact.      Coordination: Coordination is intact.      Gait: Gait is intact.   Psychiatric:         Attention and Perception: Attention and perception normal.         Mood and Affect: Mood and affect normal.         Speech: Speech normal.         Behavior: Behavior normal. Behavior is cooperative.         Thought Content: Thought content normal.         Cognition and Memory: Cognition and memory normal.         Judgment: Judgment normal.      Result Review :{Labs  Result Review  Imaging  Med Tab  Media  Procedures  :23}  {The following data was reviewed by (Optional):50820}  {Ambulatory Labs (Optional):57128}  {Data reviewed (Optional):47264:::1}             Assessment and Plan {CC Problem List  Visit Diagnosis   ROS  Review (Popup)  Health Maintenance  Quality  BestPractice  Medications  SmartSets  SnapShot Encounters  Media :23}  There are no diagnoses linked to this encounter.       {Time Spent (Optional):71711}  Follow Up {Instructions Charge Capture  Follow-up Communications :23}  No follow-ups on file.  Patient was given instructions and counseling regarding her condition or for health maintenance advice. Please see specific information pulled into the AVS if appropriate.

## 2023-10-09 NOTE — ANESTHESIA PROCEDURE NOTES
Airway  Urgency: elective    Date/Time: 10/9/2023 8:20 AM  Airway not difficult    General Information and Staff    Patient location during procedure: OR  CRNA/CAA: Jama Carroll CRNA    Indications and Patient Condition    Preoxygenated: yes  Mask difficulty assessment: 1 - vent by mask    Final Airway Details  Final airway type: endotracheal airway      Successful airway: ETT  Cuffed: yes   Successful intubation technique: direct laryngoscopy  Endotracheal tube insertion site: oral  Blade: Caicedo  Blade size: 3  ETT size (mm): 7.5  Cormack-Lehane Classification: grade I - full view of glottis  Placement verified by: chest auscultation   Cuff volume (mL): 5  Measured from: lips  ETT/EBT  to lips (cm): 21  Number of attempts at approach: 1  Assessment: lips, teeth, and gum same as pre-op and atraumatic intubation

## 2023-10-09 NOTE — ANESTHESIA POSTPROCEDURE EVALUATION
Patient: Anastasiia Rubio    Procedure Summary       Date: 10/09/23 Room / Location:  [facility] OR  /  [facility]    Anesthesia Start: 0816 Anesthesia Stop: 0901    Procedure: CYSTOSCOPY WITH BULKING AGENT INJECTION (Bladder) Diagnosis:       Stress incontinence (female) (male)      (Stress incontinence (female) (male) [N39.3])    Surgeons: Octaviano Wall MD Provider: Jama Carroll CRNA    Anesthesia Type: general ASA Status: 3            Anesthesia Type: general    Vitals  Vitals Value Taken Time   /78 10/09/23 0945   Temp 97.5 øF (36.4 øC) 10/09/23 0945   Pulse 91 10/09/23 0947   Resp 16 10/09/23 0945   SpO2 91 % 10/09/23 0947   Vitals shown include unfiled device data.        Post Anesthesia Care and Evaluation    Patient location during evaluation: PACU  Patient participation: complete - patient participated  Level of consciousness: awake and alert  Pain management: adequate    Airway patency: patent  Anesthetic complications: No anesthetic complications    Cardiovascular status: acceptable  Respiratory status: acceptable  Hydration status: acceptable    Comments: Blood pressure 111/80, pulse 79, temperature 97.5 øF (36.4 øC), resp. rate 16, SpO2 99%, not currently breastfeeding.    Pt discharged from PACU based on frances score >8

## 2023-10-09 NOTE — DISCHARGE INSTRUCTIONS
Bulkamid post op instructions  Postoperative care: Do not put anything in the vagina, and no intercourse for 1 week.  Expect vaginal spotting/small amount of bleeding (approximately 1-2 pads per day) for 1 week.    Activity: Avoid strenuous exercise for 2 days.  Recommend walking, avoid long walks and you may slowly walk upstairs.  No restrictions on lifting    Bathing: You may shower starting the day after surgery.  You may gently wash the genital area with soap and water.  After showering, pat the area dry.  Keep the genital region clean and dry after showering.  Do not submerge in water for a week.  Therefore no bath, pool, hot tub, lake etc. for 1 week.    Urinating: If you go home with a catheter in place, empty the bag as directed and call if you have uncomfortable bladder spasms.  We prefer not to leave the Casillas catheter in after this procedure.  Otherwise, void every 3-4 hours.  Expect burning with urination for a few days.    Driving: You may drive in 24 hours.    Work or school: You may return to work or school in 48 hours.

## 2023-10-09 NOTE — ANESTHESIA PREPROCEDURE EVALUATION
Anesthesia Evaluation     Patient summary reviewed   no history of anesthetic complications:   NPO Solid Status: > 8 hours             Airway   Mallampati: II  TM distance: >3 FB  No difficulty expected  Dental      Pulmonary    (+) a smoker Current, COPD,home oxygen (2L since COVID 2020), sleep apnea on CPAP  Cardiovascular   Exercise tolerance: good (4-7 METS)    (+) hypertension, hyperlipidemia  (-) pacemaker, past MI, cardiac stents      Neuro/Psych  (-) seizures, TIA, CVA  GI/Hepatic/Renal/Endo    (+) morbid obesity, GERD, diabetes mellitus type 2, thyroid problem   (-) liver disease, no renal disease    Musculoskeletal     Abdominal   (+) obese   Substance History      OB/GYN          Other                      Anesthesia Plan    ASA 3     general     (Patient on home 02 QHS and prn, did not bring portable 02. Discussed possible need or requirement of 02 supplementation to safely be discharged home. Room air 02 89-93%. Patient voices understanding and agrees to proceed)  intravenous induction     Anesthetic plan, risks, benefits, and alternatives have been provided, discussed and informed consent has been obtained with: patient.    CODE STATUS:

## 2023-10-10 ENCOUNTER — TELEPHONE (OUTPATIENT)
Dept: UROLOGY | Facility: CLINIC | Age: 53
End: 2023-10-10
Payer: COMMERCIAL

## 2023-10-10 DIAGNOSIS — B37.9 CANDIDA INFECTION: ICD-10-CM

## 2023-10-10 NOTE — TELEPHONE ENCOUNTER
"Pt. Called stating she felt fine yesterday after surgery but today having severe pain mostly in her mid-section. Pt. Rates pain 10/10 and states she feels sore \"all over\" but that her abdomen is the worst and pain is exacerbated when she coughs, etc. Pt. Denies fever, blood in her urine or any other abnormal bleeding, nausea, or any distress other than pain. Pt states she is also voiding without difficulty. Pt. States she has pain medicine but has not taken it because she does not like the side effects. Encouraged pt. To take her prescribed medication for pain or regular Tylenol. Also informed pt. It is normal to feel soreness post-op due to positioning in the OR and the procedure itself and sometimes takes a day or so to feel due to anesthesia wearing off, but that if pain were unbearable to present to ER. Pt. Verbalizes understanding.   "

## 2023-10-13 ENCOUNTER — TELEPHONE (OUTPATIENT)
Dept: FAMILY MEDICINE CLINIC | Facility: CLINIC | Age: 53
End: 2023-10-13
Payer: COMMERCIAL

## 2023-10-13 DIAGNOSIS — B37.9 CANDIDA INFECTION: ICD-10-CM

## 2023-10-13 NOTE — TELEPHONE ENCOUNTER
Patient called and stated that contacted [facility] to check the status of her prescription, they told her they never received anything for her. She would like a nurse to call her about nystatin-zinc oxide please.

## 2023-10-16 NOTE — TELEPHONE ENCOUNTER
Previous script printed.   Rx Refill Note  Requested Prescriptions     Pending Prescriptions Disp Refills    nystatin-zinc oxide 60 g 1     Sig: Apply to affected area twice daily      Last office visit with prescribing clinician: Visit date not found   Last telemedicine visit with prescribing clinician: Visit date not found   Next office visit with prescribing clinician: Visit date not found                         Would you like a call back once the refill request has been completed: [] Yes [] No    If the office needs to give you a call back, can they leave a voicemail: [] Yes [] No    Vilma Wynne LPN  10/16/23, 11:17 CDT

## 2023-11-10 ENCOUNTER — OFFICE VISIT (OUTPATIENT)
Dept: FAMILY MEDICINE CLINIC | Facility: CLINIC | Age: 53
End: 2023-11-10
Payer: COMMERCIAL

## 2023-11-10 VITALS
RESPIRATION RATE: 20 BRPM | HEIGHT: 64 IN | HEART RATE: 72 BPM | BODY MASS INDEX: 39.4 KG/M2 | SYSTOLIC BLOOD PRESSURE: 111 MMHG | WEIGHT: 230.8 LBS | OXYGEN SATURATION: 98 % | TEMPERATURE: 97.8 F | DIASTOLIC BLOOD PRESSURE: 73 MMHG

## 2023-11-10 DIAGNOSIS — E66.01 MORBID (SEVERE) OBESITY DUE TO EXCESS CALORIES: ICD-10-CM

## 2023-11-10 DIAGNOSIS — Z23 IMMUNIZATION DUE: ICD-10-CM

## 2023-11-10 DIAGNOSIS — E11.65 TYPE 2 DIABETES MELLITUS WITH HYPERGLYCEMIA, WITHOUT LONG-TERM CURRENT USE OF INSULIN: Primary | ICD-10-CM

## 2023-11-10 PROBLEM — E66.1 CLASS 2 DRUG-INDUCED OBESITY WITH BODY MASS INDEX (BMI) OF 38.0 TO 38.9 IN ADULT: Status: RESOLVED | Noted: 2022-11-01 | Resolved: 2023-11-10

## 2023-11-10 PROBLEM — E66.1 CLASS 2 DRUG-INDUCED OBESITY WITH BODY MASS INDEX (BMI) OF 38.0 TO 38.9 IN ADULT: Status: ACTIVE | Noted: 2022-11-01

## 2023-11-10 PROBLEM — E66.812 CLASS 2 DRUG-INDUCED OBESITY WITH BODY MASS INDEX (BMI) OF 38.0 TO 38.9 IN ADULT: Status: RESOLVED | Noted: 2022-11-01 | Resolved: 2023-11-10

## 2023-11-10 PROBLEM — E66.812 CLASS 2 DRUG-INDUCED OBESITY WITH BODY MASS INDEX (BMI) OF 38.0 TO 38.9 IN ADULT: Status: ACTIVE | Noted: 2022-11-01

## 2023-11-10 RX ORDER — TIRZEPATIDE 2.5 MG/.5ML
2.5 INJECTION, SOLUTION SUBCUTANEOUS WEEKLY
Qty: 2 ML | Refills: 1 | Status: SHIPPED | OUTPATIENT
Start: 2023-11-10

## 2023-11-10 NOTE — PROGRESS NOTES
Chief Complaint  Diabetes (Patient here for follow up. )    Subjective        Anastasiia Rubio presents to [facility]  History of Present Illness    The patient presents to the clinic for her influenza and pneumonia vaccine.     The patient denies wanting to receive the COVID-19 vaccine today on 11/10/2023, because when she first gets the vaccine she feels horrible for months. She is currently feeling better and denies any chills, nausea, vomiting, or diarrhea.     She has GERD which is stable with pantoprazole.     The patient has an overactive bladder which is stable with Ditropan.     She has hypertension which is stable with metoprolol      The patient experiences seasonal allergies which is stable with Xyzal.     She has vitamin D deficiency that is stable with ergocalciferol.     Her depression is stable with ibuprofen SR.     The patient has hyperlipidemia that is stable with atorvastatin.     She has COPD that is stable without an exacerbation. She is on Proventil nebulizers as needed, albuterol HFA, Spiriva, and Respimat. She wears an oxygen mask at home for nighttime. She is on 2 L of oxygen.     The patient inquires about switching from Ozempic to Mounjaro. She is doing well on the Ozempic and has changed her habits revealing that she has been exercising. She believes she would do better on the Mounjaro. She is currently taking Ozempic for type 2 diabetes. She was 268 pounds and how she is 230 pounds. She has been walking due to frequent issues with her lungs, especially after COVID-19. She put on a lot of weight due to COVID-19 which did not help. The patient began walking as she started loosing weight which at first she could not walk far without oxygen, but she has been loosing weight and walking more. One day she walked 10 miles with her kids and another time she will walk 14 miles without needing oxygen. She is doing very well to the point she believes one day she might  "not ever need oxygen. She got the oxygen when she got COVID-19 when she was in the hospital. She was diagnosed with sleep apnea and was given a sleep apnea machine, which the oxygen goes into the sleep apnea machine. She is trying to get off of the oxygen to discontinue to use at night. She has scheduled an appointment with her sleep doctor. She can tell she is breathing better due to the weight loss. She can tell a difference in herself overall with the weight loss. She expresses her goal weight is 150 pounds due to being at that way before and feeling confident with herself.     The following portions of the patient's history were reviewed and updated as appropriate: allergies, current medications, past family history, past medical history, past social history, past surgical history and problem list.      Objective   Vital Signs:  /73 (BP Location: Left arm, Patient Position: Sitting, Cuff Size: Large Adult)   Pulse 72   Temp 97.8 °F (36.6 °C) (Infrared)   Resp 20   Ht 162.6 cm (64\")   Wt 105 kg (230 lb 12.8 oz)   SpO2 98%   BMI 39.62 kg/m²   Estimated body mass index is 39.62 kg/m² as calculated from the following:    Height as of this encounter: 162.6 cm (64\").    Weight as of this encounter: 105 kg (230 lb 12.8 oz).         Physical Exam  Vitals and nursing note reviewed.   Constitutional:       General: She is awake.      Appearance: Normal appearance. She is well-developed and well-groomed.   HENT:      Head: Normocephalic and atraumatic.      Right Ear: Hearing, tympanic membrane, ear canal and external ear normal.      Left Ear: Hearing, tympanic membrane, ear canal and external ear normal.      Nose: Nose normal.      Mouth/Throat:      Lips: Pink.      Pharynx: Oropharynx is clear.   Eyes:      General: Lids are normal.      Conjunctiva/sclera: Conjunctivae normal.   Cardiovascular:      Rate and Rhythm: Normal rate and regular rhythm.      Heart sounds: Normal heart sounds.   Pulmonary:      " Effort: Pulmonary effort is normal.      Breath sounds: Normal breath sounds and air entry.   Musculoskeletal:      Cervical back: Full passive range of motion without pain.      Right lower leg: No edema.      Left lower leg: No edema.   Lymphadenopathy:      Head:      Right side of head: No submental, submandibular or tonsillar adenopathy.      Left side of head: No submental, submandibular or tonsillar adenopathy.   Skin:     General: Skin is warm and dry.   Neurological:      Mental Status: She is alert and oriented to person, place, and time.      Sensory: Sensation is intact.      Motor: Motor function is intact.      Coordination: Coordination is intact.      Gait: Gait is intact.   Psychiatric:         Attention and Perception: Attention and perception normal.         Mood and Affect: Mood and affect normal.         Speech: Speech normal.         Behavior: Behavior normal. Behavior is cooperative.         Thought Content: Thought content normal.         Cognition and Memory: Cognition and memory normal.         Judgment: Judgment normal.        Result Review :          The patient's BMI is 39.6.          Assessment and Plan   Diagnoses and all orders for this visit:    1. Type 2 diabetes mellitus with hyperglycemia, without long-term current use of insulin (Primary)  -     Tirzepatide (Mounjaro) 2.5 MG/0.5ML solution pen-injector; Inject 0.5 mL under the skin into the appropriate area as directed 1 (One) Time Per Week.  Dispense: 2 mL; Refill: 1    2. Morbid (severe) obesity due to excess calories  -     Tirzepatide (Mounjaro) 2.5 MG/0.5ML solution pen-injector; Inject 0.5 mL under the skin into the appropriate area as directed 1 (One) Time Per Week.  Dispense: 2 mL; Refill: 1        Patient's (Body mass index is 39.62 kg/m².) indicates that they are morbidly obese (BMI > 40 or > 35 with obesity - related health condition) with health related conditions that include hypertension, diabetes mellitus, and  dyslipidemias . Weight is improving with treatment. BMI is is above average; BMI management plan is completed. We discussed portion control and increasing exercise.     3. Immunization due  -     Pneumococcal Conjugate Vaccine 20-Valent (PCV20)  -     Fluzone >6 Months (5770-5926)        -     “Discussed risks/benefits to vaccination, reviewed components of the vaccine, discussed VIS, discussed informed consent, informed consent obtained. Patient/Parent was allowed to accept or refuse vaccine. Questions answered to satisfactory state of patient/Parent. We reviewed typical age appropriate and seasonally appropriate vaccinations. Reviewed immunization history and updated state vaccination form as needed. Patient was counseled on Influenza  Prevnar 20            Follow Up   Return if symptoms worsen or fail to improve.  Patient was given instructions and counseling regarding her condition or for health maintenance advice. Please see specific information pulled into the AVS if appropriate.         Transcribed from ambient dictation for Nani Adkins DNP, APRN by Nina Wiley.  11/10/23   17:10 CST    Patient or patient representative verbalized consent to the visit recording.  I have personally performed the services described in this document as transcribed by the above individual, and it is both accurate and complete.     Electronically signed by Nani Adkins DNP, APRN, 11/12/23, 10:20 PM CST.

## 2023-11-10 NOTE — PROGRESS NOTES
Chief Complaint  PO Bulkamid    Subjective          Anastasiia Rubio presents to [facility]OLOG[facility] to follow-up her stress incontinence.  The patient underwent cystoscopy with bulking agent (Bulkamid).  She has had very good response to this.  She is not wearing pads.  She does not leak with exercise.        Current Outpatient Medications:     albuterol (PROVENTIL) (2.5 MG/3ML) 0.083% nebulizer solution, USE 1 VIAL IN NEBULIZER EVERY 4 HOURS AS NEEDED, Disp: , Rfl:     albuterol sulfate  (90 Base) MCG/ACT inhaler, Inhale 2 puffs Every 4 (Four) Hours As Needed for Wheezing or Shortness of Air., Disp: 18 g, Rfl: 2    atorvastatin (LIPITOR) 40 MG tablet, Take 1 tablet by mouth Every Night., Disp: 90 tablet, Rfl: 0    Blood Glucose Monitoring Suppl (OneTouch Verio Flex System) w/Device kit, Test blood glucose level 1-2 times per day DX:E11.9, Disp: , Rfl:     budesonide-formoterol (SYMBICORT) 80-4.5 MCG/ACT inhaler, Take  by mouth Every 6 (Six) Hours., Disp: , Rfl:     buPROPion SR (Wellbutrin SR) 150 MG 12 hr tablet, Take 1 tablet by mouth 2 (Two) Times a Day for 90 days. Take 150mg daily for 3 days, then 150mg 2 times daily, Disp: 180 tablet, Rfl: 0    chlorthalidone (HYGROTON) 25 MG tablet, Take 1 tablet by mouth Daily., Disp: 90 tablet, Rfl: 1    ergocalciferol (ERGOCALCIFEROL) 1.25 MG (34499 UT) capsule, Take 1 capsule by mouth 2 (Two) Times a Week., Disp: 12 capsule, Rfl: 5    famotidine (PEPCID) 20 MG tablet, Take 1 tablet by mouth 2 (Two) Times a Day., Disp: 180 tablet, Rfl: 0    HYDROcodone-acetaminophen (NORCO) 5-325 MG per tablet, Take 1 tablet by mouth Every 8 (Eight) Hours As Needed for Moderate Pain (Pain)., Disp: 6 tablet, Rfl: 0    ibuprofen (ADVIL,MOTRIN) 600 MG tablet, Take 1 tablet by mouth Every 6 (Six) Hours As Needed for Mild Pain., Disp: 90 tablet, Rfl: 0    Lancets (OneTouch Delica Plus Ibappy05G) misc, 1 each by Other route Daily., Disp: 100 each, Rfl: 5    levocetirizine  (XYZAL) 5 MG tablet, Take 1 tablet by mouth Every Evening., Disp: 90 tablet, Rfl: 0    metoprolol succinate XL (TOPROL-XL) 50 MG 24 hr tablet, Take 1 tablet by mouth Daily., Disp: 90 tablet, Rfl: 3    O2 (OXYGEN), Inhale 2 L/min 1 (One) Time. AS NEEDED, MOSTLY THROUGH NIGHT, Disp: , Rfl:     OneTouch Verio test strip, Check FBS daily and prn for Diabetes, Disp: 100 each, Rfl: 5    oxybutynin XL (DITROPAN-XL) 10 MG 24 hr tablet, Take 1 tablet by mouth Daily., Disp: , Rfl:     pantoprazole (PROTONIX) 40 MG EC tablet, Take 1 tablet by mouth Daily., Disp: 90 tablet, Rfl: 1    Spiriva Respimat 1.25 MCG/ACT aerosol solution inhaler, Inhale 2 puffs Daily., Disp: 4 g, Rfl: 2    Tirzepatide (Mounjaro) 2.5 MG/0.5ML solution pen-injector, Inject 0.5 mL under the skin into the appropriate area as directed 1 (One) Time Per Week., Disp: 2 mL, Rfl: 1    tiZANidine (ZANAFLEX) 4 MG tablet, Take 1 tablet by mouth At Night As Needed for Muscle Spasms., Disp: 90 tablet, Rfl: 0  Past Medical History:   Diagnosis Date    Asthma 2020    Covid and COPD    COPD (chronic obstructive pulmonary disease)     COVID-19     CPAP (continuous positive airway pressure) dependence     Diabetes mellitus     GERD (gastroesophageal reflux disease) 2020    Hyperlipidemia     Hypertension     Sleep apnea     cpap with o2    Urinary tract infection      Past Surgical History:   Procedure Laterality Date    BREAST AUGMENTATION      COLONOSCOPY N/A 5/30/2023    Procedure: COLONOSCOPY WITH ANESTHESIA;  Surgeon: Christopher Freeman MD;  Location: [facility] ENDOSCOPY;  Service: Gastroenterology;  Laterality: N/A;  preop; epigastric pain   postop polyp  PCP Nani denis    CYSTOSCOPY W/ BULKING AGENT INJECTION N/A 10/9/2023    Procedure: CYSTOSCOPY WITH BULKING AGENT INJECTION;  Surgeon: Octaviano Wall MD;  Location: [facility] OR;  Service: Urology;  Laterality: N/A;    ENDOSCOPY N/A 5/30/2023    Procedure: ESOPHAGOGASTRODUODENOSCOPY WITH ANESTHESIA;  Surgeon:  Christopher Freeman MD;  Location: [facility] ENDOSCOPY;  Service: Gastroenterology;  Laterality: N/A;  preop; epigastric pain  postop normal  PCP Nani denis    LAPAROSCOPIC CHOLECYSTECTOMY      TUBAL ABDOMINAL LIGATION             Review of Systems      Objective   PHYSICAL EXAM  Vital Signs:   There were no vitals taken for this visit.    Physical Exam      DATA  Result Review :              Results for orders placed or performed during the hospital encounter of 10/09/23   POC Glucose Once    Specimen: Blood   Result Value Ref Range    Glucose 108 70 - 130 mg/dL            ASSESSMENT AND PLAN          Problem List Items Addressed This Visit    None  Visit Diagnoses       Stress incontinence (female) (male)    -  Primary    Relevant Orders    POC Urinalysis Dipstick, Multipro        Excellent response to Bulkamid.  She can follow-up as needed.        FOLLOW UP     No follow-ups on file.        (Please note that portions of this note were completed with a voice recognition program.)  Luciano Iqbal MA  11/10/23  14:56 CST

## 2023-11-13 ENCOUNTER — TELEPHONE (OUTPATIENT)
Dept: VASCULAR SURGERY | Facility: CLINIC | Age: 53
End: 2023-11-13
Payer: COMMERCIAL

## 2023-11-14 ENCOUNTER — OFFICE VISIT (OUTPATIENT)
Dept: VASCULAR SURGERY | Facility: CLINIC | Age: 53
End: 2023-11-14
Payer: COMMERCIAL

## 2023-11-14 VITALS — BODY MASS INDEX: 38.41 KG/M2 | HEIGHT: 64 IN | WEIGHT: 225 LBS

## 2023-11-14 DIAGNOSIS — I73.9 PAD (PERIPHERAL ARTERY DISEASE): Primary | ICD-10-CM

## 2023-11-14 DIAGNOSIS — E78.2 MIXED HYPERLIPIDEMIA: ICD-10-CM

## 2023-11-14 DIAGNOSIS — E11.69 TYPE 2 DIABETES MELLITUS WITH OTHER SPECIFIED COMPLICATION, WITHOUT LONG-TERM CURRENT USE OF INSULIN: ICD-10-CM

## 2023-11-14 DIAGNOSIS — I65.23 BILATERAL CAROTID ARTERY STENOSIS: ICD-10-CM

## 2023-11-14 DIAGNOSIS — I10 PRIMARY HYPERTENSION: ICD-10-CM

## 2023-11-14 PROCEDURE — 1159F MED LIST DOCD IN RCRD: CPT | Performed by: SURGERY

## 2023-11-14 PROCEDURE — 1160F RVW MEDS BY RX/DR IN RCRD: CPT | Performed by: SURGERY

## 2023-11-14 PROCEDURE — 99204 OFFICE O/P NEW MOD 45 MIN: CPT | Performed by: SURGERY

## 2023-11-14 NOTE — LETTER
November 14, 2023     Nani Adkins DNP, APRN  [address] [address]    Patient: Anastasiia Rubio   YOB: 1970   Date of Visit: 11/14/2023     Dear Nani Adkins DNP, APRN:       Thank you for referring Anastasiia Rubio to me for evaluation. Below are the relevant portions of my assessment and plan of care.    If you have questions, please do not hesitate to call me. I look forward to following Anastasiia along with you.         Sincerely,        Wing Sánchez DO        CC: No Recipients    Wing Sánchez DO  11/14/23 1321  Sign when Signing Visit  11/14/2023      Nani Adkins DNP, APRN  [address] [address]    Anastasiia Rubio  1970    Chief Complaint   Patient presents with   • NEW PATIENT     Referred from Nani Bach for bilateral leg pain. Patient states that for past her legs swell and give mild pain.       Dear Nani Adkins DNP,*    HPI  I had the pleasure of seeing your patient Anastasiia Rubio in the office today.  Thank you kindly for this consultation.  As you recall, Anastasiia Rubio is a 53 y.o.  female who you are currently following for routine health maintenance.  She states she has burning to her lower extremities and deep discomfort.  For 3 years, she was not very mobile and dealt with incontinence, but had a procedure which corrected the problem.  She does admit to some swelling to her lower extremities which resolves overnight.  She did have noninvasive testing performed which I did review in office.      Past Medical History:   Diagnosis Date   • Asthma 2020    Covid and COPD   • COPD (chronic obstructive pulmonary disease)    • COPD (chronic obstructive pulmonary disease) 04/16/2013   • COVID-19    • CPAP (continuous positive airway pressure) dependence    • Diabetes mellitus    • GERD (gastroesophageal reflux disease) 2020   • Hyperlipidemia    • Hypertension    • Hypothyroidism 11/18/2013   • Sleep apnea     cpap with o2   •  Urinary tract infection        Past Surgical History:   Procedure Laterality Date   • BREAST AUGMENTATION     • COLONOSCOPY N/A 05/30/2023    Procedure: COLONOSCOPY WITH ANESTHESIA;  Surgeon: Christopher Freeman MD;  Location: [facility] ENDOSCOPY;  Service: Gastroenterology;  Laterality: N/A;  preop; epigastric pain   postop polyp  PCP Nani denis   • CYSTOSCOPY W/ BULKING AGENT INJECTION N/A 10/09/2023    Procedure: CYSTOSCOPY WITH BULKING AGENT INJECTION;  Surgeon: Octaviano Wall MD;  Location: [facility] OR;  Service: Urology;  Laterality: N/A;   • ENDOSCOPY N/A 05/30/2023    Procedure: ESOPHAGOGASTRODUODENOSCOPY WITH ANESTHESIA;  Surgeon: Christopher Freeman MD;  Location: [facility] ENDOSCOPY;  Service: Gastroenterology;  Laterality: N/A;  preop; epigastric pain  postop normal  PCP Nani denis   • LAPAROSCOPIC CHOLECYSTECTOMY     • TUBAL ABDOMINAL LIGATION         Family History   Problem Relation Age of Onset   • Heart failure Mother    • Diabetes Mother         2000   • Lung cancer Mother    • Heart disease Mother    • Cancer Mother         Lung   • COPD Mother    • Hypertension Mother    • Heart attack Father    • Heart disease Father    • Diabetes Father         2010   • Colon cancer Father    • Cancer Father         Colon   • Hyperlipidemia Father    • Stroke Father    • Hypertension Father    • Alcohol abuse Sister    • Cancer Brother    • Alcohol abuse Brother    • Drug abuse Brother         1998       Social History     Socioeconomic History   • Marital status:    Tobacco Use   • Smoking status: Every Day     Packs/day: 1.00     Years: 35.00     Additional pack years: 0.00     Total pack years: 35.00     Types: Cigarettes     Passive exposure: Past   • Smokeless tobacco: Never   Vaping Use   • Vaping Use: Never used   Substance and Sexual Activity   • Alcohol use: Never   • Drug use: Never   • Sexual activity: Defer     Comment: Menopause       Allergies   Allergen Reactions   • Metformin Other (See  Comments) and Myalgia     When taking metformin she feels run down, fatigued, and body aches.  Off it she improves         Current Outpatient Medications:   •  albuterol (PROVENTIL) (2.5 MG/3ML) 0.083% nebulizer solution, USE 1 VIAL IN NEBULIZER EVERY 4 HOURS AS NEEDED, Disp: , Rfl:   •  albuterol sulfate  (90 Base) MCG/ACT inhaler, Inhale 2 puffs Every 4 (Four) Hours As Needed for Wheezing or Shortness of Air., Disp: 18 g, Rfl: 2  •  atorvastatin (LIPITOR) 40 MG tablet, Take 1 tablet by mouth Every Night., Disp: 90 tablet, Rfl: 0  •  Blood Glucose Monitoring Suppl (OneTouch Verio Flex System) w/Device kit, Test blood glucose level 1-2 times per day DX:E11.9, Disp: , Rfl:   •  budesonide-formoterol (SYMBICORT) 80-4.5 MCG/ACT inhaler, Take  by mouth Every 6 (Six) Hours., Disp: , Rfl:   •  buPROPion SR (Wellbutrin SR) 150 MG 12 hr tablet, Take 1 tablet by mouth 2 (Two) Times a Day for 90 days. Take 150mg daily for 3 days, then 150mg 2 times daily, Disp: 180 tablet, Rfl: 0  •  chlorthalidone (HYGROTON) 25 MG tablet, Take 1 tablet by mouth Daily., Disp: 90 tablet, Rfl: 1  •  ergocalciferol (ERGOCALCIFEROL) 1.25 MG (38052 UT) capsule, Take 1 capsule by mouth 2 (Two) Times a Week., Disp: 12 capsule, Rfl: 5  •  famotidine (PEPCID) 20 MG tablet, Take 1 tablet by mouth 2 (Two) Times a Day., Disp: 180 tablet, Rfl: 0  •  HYDROcodone-acetaminophen (NORCO) 5-325 MG per tablet, Take 1 tablet by mouth Every 8 (Eight) Hours As Needed for Moderate Pain (Pain)., Disp: 6 tablet, Rfl: 0  •  ibuprofen (ADVIL,MOTRIN) 600 MG tablet, Take 1 tablet by mouth Every 6 (Six) Hours As Needed for Mild Pain., Disp: 90 tablet, Rfl: 0  •  Lancets (OneTouch Delica Plus [id number]) misc, 1 each by Other route Daily., Disp: 100 each, Rfl: 5  •  levocetirizine (XYZAL) 5 MG tablet, Take 1 tablet by mouth Every Evening., Disp: 90 tablet, Rfl: 0  •  metoprolol succinate XL (TOPROL-XL) 50 MG 24 hr tablet, Take 1 tablet by mouth Daily., Disp: 90  "tablet, Rfl: 3  •  O2 (OXYGEN), Inhale 2 L/min 1 (One) Time. AS NEEDED, MOSTLY THROUGH NIGHT, Disp: , Rfl:   •  OneTouch Verio test strip, Check FBS daily and prn for Diabetes, Disp: 100 each, Rfl: 5  •  pantoprazole (PROTONIX) 40 MG EC tablet, Take 1 tablet by mouth Daily., Disp: 90 tablet, Rfl: 1  •  Spiriva Respimat 1.25 MCG/ACT aerosol solution inhaler, Inhale 2 puffs Daily., Disp: 4 g, Rfl: 2  •  Tirzepatide (Mounjaro) 2.5 MG/0.5ML solution pen-injector, Inject 0.5 mL under the skin into the appropriate area as directed 1 (One) Time Per Week., Disp: 2 mL, Rfl: 1  •  tiZANidine (ZANAFLEX) 4 MG tablet, Take 1 tablet by mouth At Night As Needed for Muscle Spasms., Disp: 90 tablet, Rfl: 0    Review of Systems   Constitutional: Negative.    HENT: Negative.     Eyes: Negative.    Respiratory: Negative.     Cardiovascular: Negative.    Gastrointestinal: Negative.    Endocrine: Negative.    Genitourinary: Negative.    Musculoskeletal: Negative.    Skin: Negative.    Allergic/Immunologic: Negative.    Neurological: Negative.    Hematological: Negative.    Psychiatric/Behavioral: Negative.     All other systems reviewed and are negative.    Ht 162.6 cm (64\")   Wt 102 kg (225 lb)   LMP  (LMP Unknown) Comment: lmp January  BMI 38.62 kg/m²     Physical Exam  Vitals and nursing note reviewed.   Constitutional:       Appearance: Normal appearance. She is well-developed. She is obese.   HENT:      Head: Normocephalic and atraumatic.   Eyes:      General: No scleral icterus.     Pupils: Pupils are equal, round, and reactive to light.   Neck:      Thyroid: No thyromegaly.      Vascular: No carotid bruit or JVD.   Cardiovascular:      Rate and Rhythm: Normal rate and regular rhythm.      Pulses:           Carotid pulses are 2+ on the right side and 2+ on the left side.       Femoral pulses are 2+ on the right side and 2+ on the left side.       Popliteal pulses are 2+ on the right side and 2+ on the left side.        Dorsalis " pedis pulses are 2+ on the right side and 2+ on the left side.        Posterior tibial pulses are 2+ on the right side and 2+ on the left side.      Heart sounds: Normal heart sounds.   Pulmonary:      Effort: Pulmonary effort is normal.      Breath sounds: Normal breath sounds.   Abdominal:      General: Bowel sounds are normal. There is no distension or abdominal bruit.      Palpations: Abdomen is soft. There is no mass.      Tenderness: There is no abdominal tenderness.   Musculoskeletal:         General: Normal range of motion.      Cervical back: Neck supple.   Lymphadenopathy:      Cervical: No cervical adenopathy.   Skin:     General: Skin is warm and dry.   Neurological:      Mental Status: She is alert and oriented to person, place, and time.      Cranial Nerves: No cranial nerve deficit.      Sensory: No sensory deficit.   Psychiatric:         Mood and Affect: Mood normal.         Behavior: Behavior normal.         Thought Content: Thought content normal.         Judgment: Judgment normal.       EXAMINATION: US ANKLE / BRACHIAL INDICES EXTREMITY COMPLETE- 9/27/2023  3:50 PM CDT     HISTORY: left lower leg and foot pain; M79.605-Pain in left leg.     REPORT: Bilateral sonographic lower extremity arterial evaluation was  performed with multi-level Doppler analysis and pulse volume recordings  with segmental pressures obtained at rest and stress.     The right VALERIANO equals 0.96. The Doppler waveforms are triphasic within  the right posterior tibial artery monophasic within the right dorsalis  pedis great toe digital arteries. The right TBI measures 0.58. These  findings are consistent with no significant arterial insufficiency of  the right lower extremity at rest.     The left VALERIANO equals 0.99. The Doppler waveforms are triphasic within the  left posterior tibial artery and monophasic within the left dorsalis  pedis and great toe digital arteries. The left TBI measures 0.71.These  findings are consistent with  no significant arterial insufficiency of  the left lower extremity at rest.     With exercise, the right VALERIANO increases to 1.07 and the left VALERIANO  increases to 1.10.     IMPRESSION:     1. No significant arterial insufficiency of the right lower extremity at  rest, however the toe brachial index on the right measures 0.58, which  corresponds to a moderate risk of ischemia.  2. No significant arterial insufficiency of the left lower extremity at  rest.   3. There is paradoxical mild increase in both the right and left VALERIANO  with exercise. Research has indicated that increased post exercise VALERIANO  can be associated with a higher risk for major cardiovascular events.                    This report was signed and finalized on 9/27/2023 4:08 PM CDT by Dr. Meng Blackburn MD.    Patient Active Problem List   Diagnosis   • Amenorrhea   • Cigarette smoker   • Mixed incontinence   • Actinic keratitis   • Asthma   • COPD (chronic obstructive pulmonary disease)   • Cough   • Depression   • Diabetes mellitus, type 2   • Elevated hemoglobin   • Erythrocytosis   • ET (eustachian tube disorder)   • Fracture of middle phalanx of finger   • Gas pain   • Hearing loss   • GERD (gastroesophageal reflux disease)   • Hemoptysis   • History of 2019 novel coronavirus disease (COVID-19)   • Hyperlipidemia   • Hypertension   • Hypothyroidism   • Left-sided low back pain without sciatica   • Leg edema   • Leukocytosis   • Loss of smell   • Low HDL (under 40)   • Prediabetes   • Seborrheic keratosis   • Stress incontinence, female   • Tobacco abuse   • Tobacco dependence   • Transaminitis   • Weight gain   • Amenorrhea   • Obstructive sleep apnea syndrome   • Encounter for screening for malignant neoplasm of colon   • Epigastric pain   • Inappropriate sinus tachycardia   • Supplemental oxygen dependent   • Sleep apnea   • Depressive disorder   • Hyperlipidemia   • Hypertension   • RABAGO (dyspnea on exertion)   • Wheezing   • MIKE (obstructive sleep  apnea)   • Reactive airway disease   • Sleep related hypoxia   • Cigarette nicotine dependence without complication        Diagnosis Plan   1. PAD (peripheral artery disease)  US Ankle / Brachial Indices Extremity Complete      2. Bilateral carotid artery stenosis  US Carotid Bilateral      3. Mixed hyperlipidemia        4. Primary hypertension        5. Type 2 diabetes mellitus with other specified complication, without long-term current use of insulin            Plan: After thoroughly evaluating Anastasiia Rubio, I believe the best course of action is to remain conservative from vascular surgery standpoint.  Her arterial flow seems to be intact and testing was normal.  I do think much of her leg discomfort stems from likely degenerative disc disease or bulging disc causing discomfort down her legs.  She does have complaints of swelling by the end of the day which resolves overnight.  I will plan on seeing her back in 1 year with repeat noninvasive testing including ABIs and a carotid duplex for continued surveillance given diabetes and other risk factors.  She may also be an option for home lymphedema pumps but would like to think about this and contact us if she decides to proceed. I did discuss vascular risk factors as they pertain to the progression of vascular disease including controlling her hypertension, hyperlipidemia, diabetes, and smoking cessation.  3 of these risk factors are currently stable.  Unfortunately she is a current daily smoker and does not have a desire to quit smoking at this time. The patient is to continue taking their medications as previously discussed.   This was all discussed in full with complete understanding.  Thank you for allowing me to participate in the care of your patient.  Please do not hesitate to call with any questions or concerns.  We will keep you aware of any further encounters with Anastasiia Rubio.        Sincerely yours,         DO Lucille Vázquez Lori Ann,  DNP, APRN

## 2023-11-14 NOTE — PROGRESS NOTES
11/14/2023      Nani Adkins DNP, APRN  [address] [address]    Anastasiia Rubio  1970    Chief Complaint   Patient presents with    NEW PATIENT     Referred from Nani Bach for bilateral leg pain. Patient states that for past her legs swell and give mild pain.       Dear Nani Adkins DNP,*    HPI  I had the pleasure of seeing your patient Anastasiia Rubio in the office today.  Thank you kindly for this consultation.  As you recall, Anastasiia Rubio is a 53 y.o.  female who you are currently following for routine health maintenance.  She states she has burning to her lower extremities and deep discomfort.  For 3 years, she was not very mobile and dealt with incontinence, but had a procedure which corrected the problem.  She does admit to some swelling to her lower extremities which resolves overnight.  She did have noninvasive testing performed which I did review in office.      Past Medical History:   Diagnosis Date    Asthma 2020    Covid and COPD    COPD (chronic obstructive pulmonary disease)     COPD (chronic obstructive pulmonary disease) 04/16/2013    COVID-19     CPAP (continuous positive airway pressure) dependence     Diabetes mellitus     GERD (gastroesophageal reflux disease) 2020    Hyperlipidemia     Hypertension     Hypothyroidism 11/18/2013    Sleep apnea     cpap with o2    Urinary tract infection        Past Surgical History:   Procedure Laterality Date    BREAST AUGMENTATION      COLONOSCOPY N/A 05/30/2023    Procedure: COLONOSCOPY WITH ANESTHESIA;  Surgeon: Christopher Freeman MD;  Location: [facility] ENDOSCOPY;  Service: Gastroenterology;  Laterality: N/A;  preop; epigastric pain   postop polyp  PCP Nani adkins    CYSTOSCOPY W/ BULKING AGENT INJECTION N/A 10/09/2023    Procedure: CYSTOSCOPY WITH BULKING AGENT INJECTION;  Surgeon: Octaviano Wall MD;  Location: [facility] OR;  Service: Urology;  Laterality: N/A;    ENDOSCOPY N/A 05/30/2023    Procedure:  ESOPHAGOGASTRODUODENOSCOPY WITH ANESTHESIA;  Surgeon: Christopher Freeman MD;  Location: [facility] ENDOSCOPY;  Service: Gastroenterology;  Laterality: N/A;  preop; epigastric pain  postop normal  PCP Nani denis    LAPAROSCOPIC CHOLECYSTECTOMY      TUBAL ABDOMINAL LIGATION         Family History   Problem Relation Age of Onset    Heart failure Mother     Diabetes Mother         2000    Lung cancer Mother     Heart disease Mother     Cancer Mother         Lung    COPD Mother     Hypertension Mother     Heart attack Father     Heart disease Father     Diabetes Father         2010    Colon cancer Father     Cancer Father         Colon    Hyperlipidemia Father     Stroke Father     Hypertension Father     Alcohol abuse Sister     Cancer Brother     Alcohol abuse Brother     Drug abuse Brother         1998       Social History     Socioeconomic History    Marital status:    Tobacco Use    Smoking status: Every Day     Packs/day: 1.00     Years: 35.00     Additional pack years: 0.00     Total pack years: 35.00     Types: Cigarettes     Passive exposure: Past    Smokeless tobacco: Never   Vaping Use    Vaping Use: Never used   Substance and Sexual Activity    Alcohol use: Never    Drug use: Never    Sexual activity: Defer     Comment: Menopause       Allergies   Allergen Reactions    Metformin Other (See Comments) and Myalgia     When taking metformin she feels run down, fatigued, and body aches.  Off it she improves         Current Outpatient Medications:     albuterol (PROVENTIL) (2.5 MG/3ML) 0.083% nebulizer solution, USE 1 VIAL IN NEBULIZER EVERY 4 HOURS AS NEEDED, Disp: , Rfl:     albuterol sulfate  (90 Base) MCG/ACT inhaler, Inhale 2 puffs Every 4 (Four) Hours As Needed for Wheezing or Shortness of Air., Disp: 18 g, Rfl: 2    atorvastatin (LIPITOR) 40 MG tablet, Take 1 tablet by mouth Every Night., Disp: 90 tablet, Rfl: 0    Blood Glucose Monitoring Suppl (OneTouch Verio Flex System) w/Device kit, Test  blood glucose level 1-2 times per day DX:E11.9, Disp: , Rfl:     budesonide-formoterol (SYMBICORT) 80-4.5 MCG/ACT inhaler, Take  by mouth Every 6 (Six) Hours., Disp: , Rfl:     buPROPion SR (Wellbutrin SR) 150 MG 12 hr tablet, Take 1 tablet by mouth 2 (Two) Times a Day for 90 days. Take 150mg daily for 3 days, then 150mg 2 times daily, Disp: 180 tablet, Rfl: 0    chlorthalidone (HYGROTON) 25 MG tablet, Take 1 tablet by mouth Daily., Disp: 90 tablet, Rfl: 1    ergocalciferol (ERGOCALCIFEROL) 1.25 MG (31525 UT) capsule, Take 1 capsule by mouth 2 (Two) Times a Week., Disp: 12 capsule, Rfl: 5    famotidine (PEPCID) 20 MG tablet, Take 1 tablet by mouth 2 (Two) Times a Day., Disp: 180 tablet, Rfl: 0    HYDROcodone-acetaminophen (NORCO) 5-325 MG per tablet, Take 1 tablet by mouth Every 8 (Eight) Hours As Needed for Moderate Pain (Pain)., Disp: 6 tablet, Rfl: 0    ibuprofen (ADVIL,MOTRIN) 600 MG tablet, Take 1 tablet by mouth Every 6 (Six) Hours As Needed for Mild Pain., Disp: 90 tablet, Rfl: 0    Lancets (OneTouch Delica Plus [id number]) misc, 1 each by Other route Daily., Disp: 100 each, Rfl: 5    levocetirizine (XYZAL) 5 MG tablet, Take 1 tablet by mouth Every Evening., Disp: 90 tablet, Rfl: 0    metoprolol succinate XL (TOPROL-XL) 50 MG 24 hr tablet, Take 1 tablet by mouth Daily., Disp: 90 tablet, Rfl: 3    O2 (OXYGEN), Inhale 2 L/min 1 (One) Time. AS NEEDED, MOSTLY THROUGH NIGHT, Disp: , Rfl:     OneTouch Verio test strip, Check FBS daily and prn for Diabetes, Disp: 100 each, Rfl: 5    pantoprazole (PROTONIX) 40 MG EC tablet, Take 1 tablet by mouth Daily., Disp: 90 tablet, Rfl: 1    Spiriva Respimat 1.25 MCG/ACT aerosol solution inhaler, Inhale 2 puffs Daily., Disp: 4 g, Rfl: 2    Tirzepatide (Mounjaro) 2.5 MG/0.5ML solution pen-injector, Inject 0.5 mL under the skin into the appropriate area as directed 1 (One) Time Per Week., Disp: 2 mL, Rfl: 1    tiZANidine (ZANAFLEX) 4 MG tablet, Take 1 tablet by mouth At Night As  "Needed for Muscle Spasms., Disp: 90 tablet, Rfl: 0    Review of Systems   Constitutional: Negative.    HENT: Negative.     Eyes: Negative.    Respiratory: Negative.     Cardiovascular: Negative.    Gastrointestinal: Negative.    Endocrine: Negative.    Genitourinary: Negative.    Musculoskeletal: Negative.    Skin: Negative.    Allergic/Immunologic: Negative.    Neurological: Negative.    Hematological: Negative.    Psychiatric/Behavioral: Negative.     All other systems reviewed and are negative.    Ht 162.6 cm (64\")   Wt 102 kg (225 lb)   LMP  (LMP Unknown) Comment: lmp January  BMI 38.62 kg/m²     Physical Exam  Vitals and nursing note reviewed.   Constitutional:       Appearance: Normal appearance. She is well-developed. She is obese.   HENT:      Head: Normocephalic and atraumatic.   Eyes:      General: No scleral icterus.     Pupils: Pupils are equal, round, and reactive to light.   Neck:      Thyroid: No thyromegaly.      Vascular: No carotid bruit or JVD.   Cardiovascular:      Rate and Rhythm: Normal rate and regular rhythm.      Pulses:           Carotid pulses are 2+ on the right side and 2+ on the left side.       Femoral pulses are 2+ on the right side and 2+ on the left side.       Popliteal pulses are 2+ on the right side and 2+ on the left side.        Dorsalis pedis pulses are 2+ on the right side and 2+ on the left side.        Posterior tibial pulses are 2+ on the right side and 2+ on the left side.      Heart sounds: Normal heart sounds.   Pulmonary:      Effort: Pulmonary effort is normal.      Breath sounds: Normal breath sounds.   Abdominal:      General: Bowel sounds are normal. There is no distension or abdominal bruit.      Palpations: Abdomen is soft. There is no mass.      Tenderness: There is no abdominal tenderness.   Musculoskeletal:         General: Normal range of motion.      Cervical back: Neck supple.   Lymphadenopathy:      Cervical: No cervical adenopathy.   Skin:     " General: Skin is warm and dry.   Neurological:      Mental Status: She is alert and oriented to person, place, and time.      Cranial Nerves: No cranial nerve deficit.      Sensory: No sensory deficit.   Psychiatric:         Mood and Affect: Mood normal.         Behavior: Behavior normal.         Thought Content: Thought content normal.         Judgment: Judgment normal.       EXAMINATION: US ANKLE / BRACHIAL INDICES EXTREMITY COMPLETE- 9/27/2023  3:50 PM CDT     HISTORY: left lower leg and foot pain; M79.605-Pain in left leg.     REPORT: Bilateral sonographic lower extremity arterial evaluation was  performed with multi-level Doppler analysis and pulse volume recordings  with segmental pressures obtained at rest and stress.     The right VALERIANO equals 0.96. The Doppler waveforms are triphasic within  the right posterior tibial artery monophasic within the right dorsalis  pedis great toe digital arteries. The right TBI measures 0.58. These  findings are consistent with no significant arterial insufficiency of  the right lower extremity at rest.     The left VALERIANO equals 0.99. The Doppler waveforms are triphasic within the  left posterior tibial artery and monophasic within the left dorsalis  pedis and great toe digital arteries. The left TBI measures 0.71.These  findings are consistent with no significant arterial insufficiency of  the left lower extremity at rest.     With exercise, the right VALERIANO increases to 1.07 and the left VALERIANO  increases to 1.10.     IMPRESSION:     1. No significant arterial insufficiency of the right lower extremity at  rest, however the toe brachial index on the right measures 0.58, which  corresponds to a moderate risk of ischemia.  2. No significant arterial insufficiency of the left lower extremity at  rest.   3. There is paradoxical mild increase in both the right and left VALERIANO  with exercise. Research has indicated that increased post exercise VALERIANO  can be associated with a higher risk for  major cardiovascular events.                    This report was signed and finalized on 9/27/2023 4:08 PM CDT by Dr. Meng Blackburn MD.    Patient Active Problem List   Diagnosis    Amenorrhea    Cigarette smoker    Mixed incontinence    Actinic keratitis    Asthma    COPD (chronic obstructive pulmonary disease)    Cough    Depression    Diabetes mellitus, type 2    Elevated hemoglobin    Erythrocytosis    ET (eustachian tube disorder)    Fracture of middle phalanx of finger    Gas pain    Hearing loss    GERD (gastroesophageal reflux disease)    Hemoptysis    History of 2019 novel coronavirus disease (COVID-19)    Hyperlipidemia    Hypertension    Hypothyroidism    Left-sided low back pain without sciatica    Leg edema    Leukocytosis    Loss of smell    Low HDL (under 40)    Prediabetes    Seborrheic keratosis    Stress incontinence, female    Tobacco abuse    Tobacco dependence    Transaminitis    Weight gain    Amenorrhea    Obstructive sleep apnea syndrome    Encounter for screening for malignant neoplasm of colon    Epigastric pain    Inappropriate sinus tachycardia    Supplemental oxygen dependent    Sleep apnea    Depressive disorder    Hyperlipidemia    Hypertension    RABAGO (dyspnea on exertion)    Wheezing    MIKE (obstructive sleep apnea)    Reactive airway disease    Sleep related hypoxia    Cigarette nicotine dependence without complication        Diagnosis Plan   1. PAD (peripheral artery disease)  US Ankle / Brachial Indices Extremity Complete      2. Bilateral carotid artery stenosis  US Carotid Bilateral      3. Mixed hyperlipidemia        4. Primary hypertension        5. Type 2 diabetes mellitus with other specified complication, without long-term current use of insulin            Plan: After thoroughly evaluating Anastasiia Rubio, I believe the best course of action is to remain conservative from vascular surgery standpoint.  Her arterial flow seems to be intact and testing was normal.  I do think  much of her leg discomfort stems from likely degenerative disc disease or bulging disc causing discomfort down her legs.  She does have complaints of swelling by the end of the day which resolves overnight.  I will plan on seeing her back in 1 year with repeat noninvasive testing including ABIs and a carotid duplex for continued surveillance given diabetes and other risk factors.  She may also be an option for home lymphedema pumps but would like to think about this and contact us if she decides to proceed. I did discuss vascular risk factors as they pertain to the progression of vascular disease including controlling her hypertension, hyperlipidemia, diabetes, and smoking cessation.  3 of these risk factors are currently stable.  Unfortunately she is a current daily smoker and does not have a desire to quit smoking at this time. The patient is to continue taking their medications as previously discussed.   This was all discussed in full with complete understanding.  Thank you for allowing me to participate in the care of your patient.  Please do not hesitate to call with any questions or concerns.  We will keep you aware of any further encounters with Anastasiia Rubio.        Sincerely yours,         Wing Sánchez, Nani Mack, DNP, APRN

## 2023-11-20 ENCOUNTER — OFFICE VISIT (OUTPATIENT)
Dept: UROLOGY | Facility: CLINIC | Age: 53
End: 2023-11-20
Payer: COMMERCIAL

## 2023-11-20 VITALS — WEIGHT: 226.2 LBS | BODY MASS INDEX: 40.08 KG/M2 | HEIGHT: 63 IN | TEMPERATURE: 97 F

## 2023-11-20 DIAGNOSIS — N39.3 STRESS INCONTINENCE (FEMALE) (MALE): Primary | ICD-10-CM

## 2023-11-20 LAB
BILIRUB BLD-MCNC: NEGATIVE MG/DL
CLARITY, POC: CLEAR
COLOR UR: YELLOW
GLUCOSE UR STRIP-MCNC: NEGATIVE MG/DL
KETONES UR QL: NEGATIVE
LEUKOCYTE EST, POC: NEGATIVE
NITRITE UR-MCNC: NEGATIVE MG/ML
PH UR: 7.5 [PH] (ref 5–8)
PROT UR STRIP-MCNC: NEGATIVE MG/DL
RBC # UR STRIP: NEGATIVE /UL
SP GR UR: 1.01 (ref 1–1.03)
UROBILINOGEN UR QL: NORMAL

## 2023-12-12 ENCOUNTER — TELEMEDICINE (OUTPATIENT)
Dept: FAMILY MEDICINE CLINIC | Facility: CLINIC | Age: 53
End: 2023-12-12
Payer: COMMERCIAL

## 2023-12-12 VITALS — HEIGHT: 63 IN | WEIGHT: 226 LBS | BODY MASS INDEX: 40.04 KG/M2

## 2023-12-12 DIAGNOSIS — M54.32 SCIATICA OF LEFT SIDE: ICD-10-CM

## 2023-12-12 DIAGNOSIS — M25.552 LEFT HIP PAIN: Primary | ICD-10-CM

## 2023-12-12 PROCEDURE — 1159F MED LIST DOCD IN RCRD: CPT | Performed by: NURSE PRACTITIONER

## 2023-12-12 PROCEDURE — 99213 OFFICE O/P EST LOW 20 MIN: CPT | Performed by: NURSE PRACTITIONER

## 2023-12-12 PROCEDURE — 1160F RVW MEDS BY RX/DR IN RCRD: CPT | Performed by: NURSE PRACTITIONER

## 2023-12-12 PROCEDURE — 3044F HG A1C LEVEL LT 7.0%: CPT | Performed by: NURSE PRACTITIONER

## 2023-12-12 RX ORDER — PREDNISONE 20 MG/1
20 TABLET ORAL DAILY
Qty: 7 TABLET | Refills: 0 | Status: SHIPPED | OUTPATIENT
Start: 2023-12-12 | End: 2023-12-19

## 2023-12-12 RX ORDER — MELOXICAM 15 MG/1
15 TABLET ORAL DAILY
Qty: 30 TABLET | Refills: 0 | Status: SHIPPED | OUTPATIENT
Start: 2023-12-12

## 2023-12-12 NOTE — PROGRESS NOTES
Chief Complaint  Hip Pain    Subjective        Anastasiia Rubio presents to [facility]  History of Present Illness  This was an audio and video enabled telemedicine encounter.  She is at her home and I am working remotely from home for [facility].  She said she had a flare up of left hip pain that started couple weeks ago and it hurt so bad that she went to the [facility] where she lives.   She says that they did ct and told her that she had sciatica, gave her meloxicam for 5 days  and told her to follow up with PCP.  The medication worked and she improved so she didn't worry about following up.   Then 2 days ago it flared up again and says it was difficult to bend over and pick things up so she felt she needed follow up.  She says her pain right now is 2 but if she bends over to pick anything up it goes up to a 9.  She denies any bowel or bladder changes, denies any numbness or tingling.      Hip Pain   The incident occurred more than 1 week ago. The incident occurred at home. There was no injury mechanism. The pain is present in the left hip. The quality of the pain is described as aching, burning and shooting. The pain is at a severity of 9/10. The pain is severe. The pain has been Intermittent since onset. Associated symptoms include muscle weakness. Pertinent negatives include no inability to bear weight, loss of motion, numbness or tingling. She reports no foreign bodies present. The symptoms are aggravated by movement, palpation and weight bearing. She has tried NSAIDs and rest for the symptoms. The treatment provided mild relief.     The following portions of the patient's history were reviewed and updated as appropriate: allergies, current medications, past family history, past medical history, past social history, past surgical history and problem list.    Patient's (Body mass index is 40.03 kg/m².) indicates that they are morbidly obese (BMI > 40 or >  "35 with obesity - related health condition) with health related conditions that include dyslipidemias and GERD . Weight is unchanged. BMI is is above average; BMI management plan is completed. We discussed portion control and increasing exercise.        Objective   Physical Exam   Constitutional: She is oriented to person, place, and time. She appears well-developed.   HENT:   Head: Normocephalic and atraumatic.   Right Ear: Hearing and external ear normal.   Left Ear: Hearing and external ear normal.   Nose: Nose normal.   Mouth/Throat: Oropharynx is clear and moist.   Pulmonary/Chest: Effort normal.   Musculoskeletal:      Right hip: Normal.      Left hip: She exhibits decreased range of motion, decreased strength and tenderness.        Legs:    Lymphadenopathy:        Head (right side): No submental adenopathy present.        Head (left side): No submental adenopathy present.   Neurological: She is alert and oriented to person, place, and time. She has normal strength.   Skin: Capillary refill takes 2 to 3 seconds. Turgor is normal.   Psychiatric: She has a normal mood and affect. Her speech is normal and behavior is normal.       Vital Signs:  Ht 160 cm (63\")   Wt 103 kg (226 lb)   BMI 40.03 kg/m²   Estimated body mass index is 40.07 kg/m² as calculated from the following:    Height as of 11/20/23: 160 cm (63\").    Weight as of 11/20/23: 103 kg (226 lb 3.2 oz).       Result Review :               Assessment and Plan   Diagnoses and all orders for this visit:    1. Left hip pain (Primary)  2. Sciatica of left side  -     meloxicam (MOBIC) 15 MG tablet; Take 1 tablet by mouth Daily.  Dispense: 30 tablet; Refill: 0  -     predniSONE (DELTASONE) 20 MG tablet; Take 1 tablet by mouth Daily for 7 days.  Dispense: 7 tablet; Refill: 0         -    may use moist heat several times a day         Follow Up   Return in about 2 weeks (around 12/26/2023) for Recheck.  Patient was given instructions and counseling regarding " her condition or for health maintenance advice. Please see specific information pulled into the AVS if appropriate.     Electronically signed by Nani Adkins DNP, APRN, 12/12/23, 2:37 PM CST.

## 2023-12-17 DIAGNOSIS — E78.2 MIXED HYPERLIPIDEMIA: ICD-10-CM

## 2023-12-17 DIAGNOSIS — I10 PRIMARY HYPERTENSION: ICD-10-CM

## 2023-12-18 RX ORDER — CHLORTHALIDONE 25 MG/1
25 TABLET ORAL DAILY
Qty: 15 TABLET | Refills: 0 | Status: SHIPPED | OUTPATIENT
Start: 2023-12-18

## 2023-12-18 RX ORDER — ATORVASTATIN CALCIUM 40 MG/1
40 TABLET, FILM COATED ORAL NIGHTLY
Qty: 15 TABLET | Refills: 0 | Status: SHIPPED | OUTPATIENT
Start: 2023-12-18

## 2023-12-18 NOTE — TELEPHONE ENCOUNTER
Rx Refill Note  Requested Prescriptions     Pending Prescriptions Disp Refills    chlorthalidone (HYGROTON) 25 MG tablet [Pharmacy Med Name: Chlorthalidone 25 MG Oral Tablet] 90 tablet 0     Sig: Take 1 tablet by mouth once daily    atorvastatin (LIPITOR) 40 MG tablet [Pharmacy Med Name: Atorvastatin Calcium 40 MG Oral Tablet] 90 tablet 0     Sig: TAKE 1 TABLET BY MOUTH ONCE DAILY AT NIGHT      Last office visit with prescribing clinician: 11/10/2023   Last telemedicine visit with prescribing clinician: 12/12/2023   Next office visit with prescribing clinician: 12/29/2023                         Would you like a call back once the refill request has been completed: [] Yes [] No    If the office needs to give you a call back, can they leave a voicemail: [] Yes [] No    Chelsea Lee, PCT  12/18/23, 09:32 CST

## 2023-12-19 RX ORDER — METOPROLOL SUCCINATE 50 MG/1
50 TABLET, EXTENDED RELEASE ORAL DAILY
Qty: 90 TABLET | Refills: 0 | OUTPATIENT
Start: 2023-12-19

## 2023-12-29 ENCOUNTER — OFFICE VISIT (OUTPATIENT)
Dept: FAMILY MEDICINE CLINIC | Facility: CLINIC | Age: 53
End: 2023-12-29
Payer: COMMERCIAL

## 2023-12-29 VITALS
RESPIRATION RATE: 18 BRPM | HEART RATE: 84 BPM | DIASTOLIC BLOOD PRESSURE: 82 MMHG | HEIGHT: 63 IN | BODY MASS INDEX: 40.04 KG/M2 | OXYGEN SATURATION: 99 % | SYSTOLIC BLOOD PRESSURE: 120 MMHG | TEMPERATURE: 98.6 F | WEIGHT: 226 LBS

## 2023-12-29 DIAGNOSIS — M25.552 LEFT HIP PAIN: ICD-10-CM

## 2023-12-29 DIAGNOSIS — Z72.0 TOBACCO USE: ICD-10-CM

## 2023-12-29 DIAGNOSIS — I10 PRIMARY HYPERTENSION: ICD-10-CM

## 2023-12-29 DIAGNOSIS — E55.9 VITAMIN D DEFICIENCY: ICD-10-CM

## 2023-12-29 DIAGNOSIS — J44.9 CHRONIC OBSTRUCTIVE PULMONARY DISEASE, UNSPECIFIED COPD TYPE: Primary | ICD-10-CM

## 2023-12-29 DIAGNOSIS — K21.00 GASTROESOPHAGEAL REFLUX DISEASE WITH ESOPHAGITIS WITHOUT HEMORRHAGE: ICD-10-CM

## 2023-12-29 DIAGNOSIS — M54.32 SCIATICA OF LEFT SIDE: ICD-10-CM

## 2023-12-29 DIAGNOSIS — Z71.6 TOBACCO ABUSE COUNSELING: ICD-10-CM

## 2023-12-29 DIAGNOSIS — J44.9 COPD MIXED TYPE: ICD-10-CM

## 2023-12-29 DIAGNOSIS — M79.605 LEG PAIN, LEFT: ICD-10-CM

## 2023-12-29 PROCEDURE — 3074F SYST BP LT 130 MM HG: CPT | Performed by: NURSE PRACTITIONER

## 2023-12-29 PROCEDURE — 99214 OFFICE O/P EST MOD 30 MIN: CPT | Performed by: NURSE PRACTITIONER

## 2023-12-29 PROCEDURE — 3079F DIAST BP 80-89 MM HG: CPT | Performed by: NURSE PRACTITIONER

## 2023-12-29 RX ORDER — BUDESONIDE AND FORMOTEROL FUMARATE DIHYDRATE 80; 4.5 UG/1; UG/1
2 AEROSOL RESPIRATORY (INHALATION) EVERY 6 HOURS
Qty: 10.2 G | Refills: 5 | Status: SHIPPED | OUTPATIENT
Start: 2023-12-29

## 2023-12-29 RX ORDER — MELOXICAM 15 MG/1
15 TABLET ORAL DAILY
Qty: 90 TABLET | Refills: 1 | Status: SHIPPED | OUTPATIENT
Start: 2023-12-29

## 2023-12-29 RX ORDER — METOPROLOL SUCCINATE 50 MG/1
50 TABLET, EXTENDED RELEASE ORAL DAILY
Qty: 90 TABLET | Refills: 1 | Status: SHIPPED | OUTPATIENT
Start: 2023-12-29

## 2023-12-29 RX ORDER — TIOTROPIUM BROMIDE INHALATION SPRAY 1.56 UG/1
2 SPRAY, METERED RESPIRATORY (INHALATION)
Qty: 4 G | Refills: 2 | Status: SHIPPED | OUTPATIENT
Start: 2023-12-29

## 2023-12-29 RX ORDER — ALBUTEROL SULFATE 90 UG/1
2 AEROSOL, METERED RESPIRATORY (INHALATION) EVERY 4 HOURS PRN
Qty: 18 G | Refills: 2 | Status: SHIPPED | OUTPATIENT
Start: 2023-12-29

## 2023-12-29 RX ORDER — TIZANIDINE 4 MG/1
4 TABLET ORAL NIGHTLY PRN
Qty: 90 TABLET | Refills: 1 | Status: SHIPPED | OUTPATIENT
Start: 2023-12-29

## 2023-12-29 RX ORDER — ERGOCALCIFEROL 1.25 MG/1
50000 CAPSULE ORAL 2 TIMES WEEKLY
Qty: 12 CAPSULE | Refills: 5 | Status: SHIPPED | OUTPATIENT
Start: 2024-01-01

## 2023-12-29 RX ORDER — BUPROPION HYDROCHLORIDE 150 MG/1
150 TABLET, EXTENDED RELEASE ORAL 2 TIMES DAILY
Qty: 180 TABLET | Refills: 1 | Status: SHIPPED | OUTPATIENT
Start: 2023-12-29 | End: 2024-06-26

## 2023-12-29 NOTE — PROGRESS NOTES
Chief Complaint  Diabetes (Pt here for follow up) and Constipation    Subjective        Anastasiia Rubio presents to [facility]  History of Present Illness    The patient is a 53-year-old female who presents for medication refills and abdominal issues.    She needs refills on her albuterol, Spiriva, and Symbicort inhalers. She needs a meloxicam refill. She ran out of her Wellbutrin yesterday. Her breathing is improving.    The abdominal medications she takes do not provide relief. She has had abdominal issues for the past few years. She never had abdominal issues or issues with bowel movements until she got COVID-19. Now, she goes 2 to 3 days without a bowel movement. For the past 4 days, she has taken 2 pills of senna laxatives every morning and every night. Within a 48-hour period, she had 2 full bottles of magnesium citrate. It took her until this morning, 12/29/2023 to have her first bowel movement in 4 days. She had severe abdominal pain this morning. This has only happened a handful of times. Her pain alleviates if she lays on her abdomen or takes a pillow and holds it against her abdomen. No matter what she eats or drinks, she feels like she is going to have an episode of emesis. She feels full. She is going to start drinking 6 bottles of water a day. She had a slight bowel movement at 5:00 AM this morning, 12/29/2023. This morning, her stool appeared black, but when she looked at the toilet paper after wiping, her stool was dark brown.    She tried Mounjaro and the injections were painful. The Ozempic injections were not as painful for her and she would like to switch back.     The following portions of the patient's history were reviewed and updated as appropriate: allergies, current medications, past family history, past medical history, past social history, past surgical history and problem list.    Objective   Vital Signs:  /82 (BP Location: Right arm, Patient Position:  "Sitting, Cuff Size: Large Adult)   Pulse 84   Temp 98.6 °F (37 °C) (Infrared)   Resp 18   Ht 160 cm (63\") Comment: per patient  Wt 103 kg (226 lb)   SpO2 99%   BMI 40.03 kg/m²   Estimated body mass index is 40.03 kg/m² as calculated from the following:    Height as of this encounter: 160 cm (63\").    Weight as of this encounter: 103 kg (226 lb).               Physical Exam  Vitals and nursing note reviewed.   Constitutional:       General: She is awake.      Appearance: Normal appearance. She is well-developed and well-groomed.   HENT:      Head: Normocephalic and atraumatic.      Right Ear: Hearing, tympanic membrane, ear canal and external ear normal.      Left Ear: Hearing, tympanic membrane, ear canal and external ear normal.      Nose: Nose normal.      Mouth/Throat:      Lips: Pink.      Pharynx: Oropharynx is clear.   Eyes:      General: Lids are normal.      Conjunctiva/sclera: Conjunctivae normal.   Cardiovascular:      Rate and Rhythm: Normal rate and regular rhythm.      Heart sounds: Normal heart sounds.   Pulmonary:      Effort: Pulmonary effort is normal.      Breath sounds: Normal breath sounds and air entry.   Musculoskeletal:      Cervical back: Full passive range of motion without pain.      Right lower leg: No edema.      Left lower leg: No edema.   Lymphadenopathy:      Head:      Right side of head: No submental, submandibular or tonsillar adenopathy.      Left side of head: No submental, submandibular or tonsillar adenopathy.   Skin:     General: Skin is warm and dry.   Neurological:      Mental Status: She is alert.      Sensory: Sensation is intact.      Motor: Motor function is intact.      Coordination: Coordination is intact.      Gait: Gait is intact.   Psychiatric:         Attention and Perception: Attention and perception normal.         Mood and Affect: Mood and affect normal.         Speech: Speech normal.         Behavior: Behavior normal. Behavior is cooperative.         " Thought Content: Thought content normal.         Cognition and Memory: Cognition and memory normal.         Judgment: Judgment normal.        Result Review :              Assessment and Plan   Diagnoses and all orders for this visit:    1. Chronic obstructive pulmonary disease, unspecified COPD type (Primary)    2. COPD mixed type  -     albuterol sulfate  (90 Base) MCG/ACT inhaler; Inhale 2 puffs Every 4 (Four) Hours As Needed for Wheezing or Shortness of Air.  Dispense: 18 g; Refill: 2  -     budesonide-formoterol (SYMBICORT) 80-4.5 MCG/ACT inhaler; Inhale 2 puffs Every 6 (Six) Hours.  Dispense: 10.2 g; Refill: 5  -     Spiriva Respimat 1.25 MCG/ACT aerosol solution inhaler; Inhale 2 puffs Daily.  Dispense: 4 g; Refill: 2    3. Left hip pain  -     meloxicam (MOBIC) 15 MG tablet; Take 1 tablet by mouth Daily.  Dispense: 90 tablet; Refill: 1    4. Sciatica of left side  -     meloxicam (MOBIC) 15 MG tablet; Take 1 tablet by mouth Daily.  Dispense: 90 tablet; Refill: 1    5. Tobacco use  6. Tobacco abuse counseling > 3 minutes  -     buPROPion SR (Wellbutrin SR) 150 MG 12 hr tablet; Take 1 tablet by mouth 2 (Two) Times a Day for 180 days. Take 150mg daily for 3 days, then 150mg 2 times daily  Dispense: 180 tablet; Refill: 1         - Tobacco abuse: Smoking Cessation discussed today for  >10 minutes.  Patient advised regarding the risks of continued tobacco use  Set a quit date    Methods to quit smoking were discussed   Handouts offered to the patient regarding tobacco cessation  Patient expressed willingness to quit smoking   Benefits of oral medications were discussed:  Chantix/Wellbutrin/Nicoderm CQ/gum   Risks and side effects of medication discussed:  Suicidal thoughts, behavior change, agitation, hostility  Discussed no benefit or recommendation to switch to E. Cigarettes as health hazards are not yet known.   Lifestyle changes discussed  Stress reduction discussed  BH cessation class and handout  offered to patient today.   Discussed to consider ration technique to quit with time: Each day set out the number of cigarettes that you allow yourself to smoke.  Each day decrease this number by 1 cigarrette until you get to a point that you feel you need another cigarette.  You can hold at that level x 1 week.  Continue to decrease until you either are tobacco free or until you cannot decline any further. When you cannot decrease this any further you can return and we can discussed medication options again.  Initial goal with reduction technique is 25% in 3 months.  Write down all the reasons why you want to quit: money, children, spouse, being healthy  Don't buy cartons of cigarettes, buy one pack at a time so cigarettes are not available  Write down ways that you plan to stop the cravings: chewing gum, candy, drink a glass of water, kiss your child, take a walk, read a book, throw a ball and exercise  Develop a contract and have a supportive witness to sign and post on refrigerator  Get rid of all smoking paraphernalia: left over cigarettes, matches, lighters  Avoid too much caffeine, it will cause jitters when trying to quit  In place of smoking cigarettes try sunflower seeds, sugar free lollipops, gum, carrot or celery sticks.   Try herbal tea when you have a craving to smoke, it will relax you.   Instead of taking a smoking break at work play a game of imo.im or take a walk  Create a smoke free zone, don't allow anyone to smoke in your home, car, or sitting next to you  Make actual no smoking signs and post in hour house  Search the Internet on ways to quit smoking:  Http://smokefree.gov/smokefreetxt and www.heart.org  Report any side effects of smoking cessation medications to your health care provider  Make an index card and any time you are tempter to light up, take it out and read it:          A.  Smoking increases the risk of lung, bladder, pancreatic, mouth, and esophageal              cancers         B.  Smoking increases the risk of heart disease, stroke and high blood pressure        C.  Smoking reduces levels of folate.  Low Levels can increase the risk for              depression and Alzheimer's disease        D.  Smoking  Increases the likelihood of impotence        E.  Smoking affects the ability to smell and taste        F.  Smoking results in low birthrate babies    7. Vitamin D deficiency  -     ergocalciferol (ERGOCALCIFEROL) 1.25 MG (86961 UT) capsule; Take 1 capsule by mouth 2 (Two) Times a Week.  Dispense: 12 capsule; Refill: 5    8. Gastroesophageal reflux disease with esophagitis without hemorrhage    9. Leg pain, left  -     tiZANidine (ZANAFLEX) 4 MG tablet; Take 1 tablet by mouth At Night As Needed for Muscle Spasms.  Dispense: 90 tablet; Refill: 1    10. Primary hypertension  Comments:  change Lopressor to Toprol  Orders:  -     metoprolol succinate XL (TOPROL-XL) 50 MG 24 hr tablet; Take 1 tablet by mouth Daily.  Dispense: 90 tablet; Refill: 1    Other orders  -     Semaglutide, 1 MG/DOSE, (OZEMPIC) 4 MG/3ML solution pen-injector; Inject 1 mg under the skin into the appropriate area as directed 1 (One) Time Per Week.  Dispense: 3 mL; Refill: 0             Follow Up   Return in about 1 month (around 1/29/2024) for Recheck.  Patient was given instructions and counseling regarding her condition or for health maintenance advice. Please see specific information pulled into the AVS if appropriate.         Answers submitted by the patient for this visit:  Other (Submitted on 12/29/2023)  Please describe your symptoms.: Having trouble with using the restroom  Have you had these symptoms before?: Yes  How long have you been having these symptoms?: 1-4 days  Please list any medications you are currently taking for this condition.: Magnesium Citrate and Neida Laxitive, MiraLax  Primary Reason for Visit (Submitted on 12/29/2023)  What is the primary reason for your visit?: Other    Transcribed from  ambient dictation for Nani Adkins DNP, APRN by Gracie Mcdonald.  12/29/23   16:28 CST    Patient or patient representative verbalized consent to the visit recording.  I have personally performed the services described in this document as transcribed by the above individual, and it is both accurate and complete.    Electronically signed by Nani Adkins DNP, APRALEX, 01/01/24, 7:47 PM CST.

## 2024-01-11 ENCOUNTER — TELEPHONE (OUTPATIENT)
Dept: FAMILY MEDICINE CLINIC | Facility: CLINIC | Age: 54
End: 2024-01-11
Payer: COMMERCIAL

## 2024-01-11 RX ORDER — ALBUTEROL SULFATE 90 UG/1
2 AEROSOL, METERED RESPIRATORY (INHALATION) EVERY 4 HOURS PRN
Qty: 8 G | Refills: 2 | Status: SHIPPED | OUTPATIENT
Start: 2024-01-11

## 2024-01-23 RX ORDER — SEMAGLUTIDE 1.34 MG/ML
INJECTION, SOLUTION SUBCUTANEOUS
Qty: 3 ML | Refills: 0 | Status: SHIPPED | OUTPATIENT
Start: 2024-01-23

## 2024-01-23 NOTE — TELEPHONE ENCOUNTER
Patient started on ozempic 1mg on 12/29/2023. Please review if a new dose is needed.     Rx Refill Note  Requested Prescriptions     Pending Prescriptions Disp Refills    Ozempic, 1 MG/DOSE, 4 MG/3ML solution pen-injector [Pharmacy Med Name: Ozempic (1 MG/DOSE) 4 MG/3ML Subcutaneous Solution Pen-injector] 3 mL 0     Sig: INJECT 1 MG SUBCUTANEOUSLY ONCE A WEEK      Last office visit with prescribing clinician: 12/29/2023   Last telemedicine visit with prescribing clinician: 12/12/2023   Next office visit with prescribing clinician: 1/29/2024                         Would you like a call back once the refill request has been completed: [] Yes [] No    If the office needs to give you a call back, can they leave a voicemail: [] Yes [] No    Vilma Wynne LPN  01/23/24, 10:21 CST

## 2024-01-24 DIAGNOSIS — E78.2 MIXED HYPERLIPIDEMIA: ICD-10-CM

## 2024-01-24 RX ORDER — ATORVASTATIN CALCIUM 40 MG/1
40 TABLET, FILM COATED ORAL NIGHTLY
Qty: 15 TABLET | Refills: 0 | Status: SHIPPED | OUTPATIENT
Start: 2024-01-24

## 2024-01-24 NOTE — TELEPHONE ENCOUNTER
Rx Refill Note  Requested Prescriptions     Pending Prescriptions Disp Refills    atorvastatin (LIPITOR) 40 MG tablet [Pharmacy Med Name: Atorvastatin Calcium 40 MG Oral Tablet] 15 tablet 0     Sig: TAKE 1 TABLET BY MOUTH ONCE DAILY AT NIGHT      Last office visit with prescribing clinician: 12/29/2023   Last telemedicine visit with prescribing clinician: 12/12/2023   Next office visit with prescribing clinician: 1/29/2024                         Would you like a call back once the refill request has been completed: [] Yes [] No    If the office needs to give you a call back, can they leave a voicemail: [] Yes [] No    Vilma Wynne LPN  01/24/24, 09:07 CST

## 2024-01-29 ENCOUNTER — TELEPHONE (OUTPATIENT)
Dept: FAMILY MEDICINE CLINIC | Facility: CLINIC | Age: 54
End: 2024-01-29
Payer: COMMERCIAL

## 2024-01-29 ENCOUNTER — PATIENT MESSAGE (OUTPATIENT)
Dept: FAMILY MEDICINE CLINIC | Facility: CLINIC | Age: 54
End: 2024-01-29

## 2024-01-29 ENCOUNTER — OFFICE VISIT (OUTPATIENT)
Dept: FAMILY MEDICINE CLINIC | Facility: CLINIC | Age: 54
End: 2024-01-29
Payer: COMMERCIAL

## 2024-01-29 VITALS
OXYGEN SATURATION: 99 % | DIASTOLIC BLOOD PRESSURE: 79 MMHG | TEMPERATURE: 98.7 F | HEART RATE: 91 BPM | WEIGHT: 231 LBS | BODY MASS INDEX: 40.93 KG/M2 | RESPIRATION RATE: 18 BRPM | SYSTOLIC BLOOD PRESSURE: 115 MMHG | HEIGHT: 63 IN

## 2024-01-29 DIAGNOSIS — F51.01 PRIMARY INSOMNIA: ICD-10-CM

## 2024-01-29 DIAGNOSIS — B07.0 PLANTAR WART OF LEFT FOOT: ICD-10-CM

## 2024-01-29 DIAGNOSIS — J44.9 COPD MIXED TYPE: ICD-10-CM

## 2024-01-29 DIAGNOSIS — I10 PRIMARY HYPERTENSION: ICD-10-CM

## 2024-01-29 DIAGNOSIS — E78.2 MIXED HYPERLIPIDEMIA: ICD-10-CM

## 2024-01-29 DIAGNOSIS — E55.9 VITAMIN D DEFICIENCY: ICD-10-CM

## 2024-01-29 DIAGNOSIS — E11.40 TYPE 2 DIABETES, CONTROLLED, WITH NEUROPATHY: Primary | ICD-10-CM

## 2024-01-29 DIAGNOSIS — R11.0 NAUSEA: ICD-10-CM

## 2024-01-29 PROBLEM — R43.0 LOSS OF SMELL: Status: RESOLVED | Noted: 2020-09-04 | Resolved: 2024-01-29

## 2024-01-29 PROBLEM — R04.2 HEMOPTYSIS: Status: RESOLVED | Noted: 2020-05-14 | Resolved: 2024-01-29

## 2024-01-29 PROBLEM — R10.13 EPIGASTRIC PAIN: Status: RESOLVED | Noted: 2023-03-03 | Resolved: 2024-01-29

## 2024-01-29 PROCEDURE — 99214 OFFICE O/P EST MOD 30 MIN: CPT | Performed by: NURSE PRACTITIONER

## 2024-01-29 PROCEDURE — 3074F SYST BP LT 130 MM HG: CPT | Performed by: NURSE PRACTITIONER

## 2024-01-29 PROCEDURE — 3078F DIAST BP <80 MM HG: CPT | Performed by: NURSE PRACTITIONER

## 2024-01-29 RX ORDER — TRAZODONE HYDROCHLORIDE 100 MG/1
100 TABLET ORAL NIGHTLY
Qty: 30 TABLET | Refills: 1 | Status: SHIPPED | OUTPATIENT
Start: 2024-01-29

## 2024-01-29 RX ORDER — METOPROLOL SUCCINATE 50 MG/1
50 TABLET, EXTENDED RELEASE ORAL DAILY
Qty: 90 TABLET | Refills: 1 | Status: SHIPPED | OUTPATIENT
Start: 2024-01-29

## 2024-01-29 RX ORDER — ONDANSETRON 4 MG/1
4 TABLET, ORALLY DISINTEGRATING ORAL EVERY 8 HOURS PRN
Qty: 20 TABLET | Refills: 0 | Status: SHIPPED | OUTPATIENT
Start: 2024-01-29

## 2024-01-29 NOTE — TELEPHONE ENCOUNTER
At checkout today , patient said she needs a refill of Ozempic sent to [facility] on [address] in [place]. She said she forgot to tell Nani at her appointment.

## 2024-01-29 NOTE — PROGRESS NOTES
Chief Complaint  COPD (Pt here for follow up)    Subjective        Anastasiia Rubio presents to [facility]  History of Present Illness    The patient is here for refills. She is here for COPD, hypertension, hyperlipidemia, GERD, and type 2 diabetes. Past medical history includes COPD. Hypertension is chronic. She has had it for more than a year. Blood pressure today is great at 115/79 mmHg. Her condition is chronic, stable. It is controlled agents associated with hypertension. There is no amphetamines, no anaerobic, no oral contraceptives and no thyroid hormones.     Her CAD risk includes diabetes, dyslipidemia, family history, obesity, postmenopausal state, smoking, tobacco abuse. Her past medical treatments for hypertension include hydralazine. Her current therapy is chlorthalidone without any problems. Improvement is mild. No compliance problems. No strokes, no CVA, no heart failure. Hyperlipidemia, chronic, had it for more than a year.     Her last lipids were elevated. Her exasperating diseases  are obesity, currently on atorvastatin, aggravated by fatty foods. No associated focal sensory loss, chest pain or shortness of breath. Her current therapy is a statin with improvement, no compliance problems. GERD, chronic. She has had it more than a year. She currently takes Protonix. She has had some belching and heartburn. Progression has been improved.     Her associated symptoms, no melena, no fatigue, no orthopnea. Risk factors for caffeine use, obesity, like exercise and smoking. Improvement is mild. She has type 2 diabetes, no medical alert. It is chronic. She has had it for more than a year. It is stable. The last hemoglobin A1c that she was done in 06/2024 and her level was 6.2 percent, which is good. She currently takes Ozempic. She denies any blurred vision, no polydipsia, polyphagia, polyuria.  Her diabetic complications, she does have some neuropathy that bothers her, no  "retinopathy. Her CAD risk includes diabetes, dyslipidemia, family history, hypertension, obesity, smoking, tobacco abuse.    She coughs up phlegm. She has nasal congestion. She wears her oxygen at 2 L at night as needed. She smokes 1 pack a day. She denies any headache, chest pain, or dyspnea.    She takes chlorthalidone for her hypertension.    She has had no hypoglycemic effects like nervousness, tremors, dizziness where her sugar bottomed out. She denies any blackouts. She checks her blood glucose levels at home. Normally in the mornings when she first wakes up, it is around 95 mg/dL and as the day goes on, it is around 150 mg/dL. Her eye exam is up to date. She has not seen the podiatrist or the dietitian.    She has a wart on the bottom of her left foot. It has been there for a couple of years. She only had 1 wart, but now she has 2.    She still cries frequently. She thinks it is because she is going through menopause. She has a hard time staying asleep. This has been going on for a long time. She is shanice to get 4 hours of sleep. She has dark circles under her eyes. She wants to try something for sleep. She can go to sleep and 2 hours later, she is up. She feels energized. She went to bed at 9:00 PM, but she woke up at 2:00 AM. She gets sluggish around 5:00 AM or 6:00 AM.    She has had some muscle tension, insomnia no suicidal or homicidal ideations, no panic no weight gain. Her weight's been staying about the same. Most days moderate like she says anywhere from 2 to 4 hours of sleep. She sleeps good, but then she wakes up and cannot go back to sleep. Her compliance is 100 percent.     Objective   Vital Signs:  /79 (BP Location: Right arm, Patient Position: Sitting, Cuff Size: Large Adult)   Pulse 91   Temp 98.7 °F (37.1 °C) (Infrared)   Resp 18   Ht 160 cm (63\") Comment: per patient  Wt 105 kg (231 lb)   SpO2 99%   BMI 40.92 kg/m²   Estimated body mass index is 40.92 kg/m² as calculated from " "the following:    Height as of this encounter: 160 cm (63\").    Weight as of this encounter: 105 kg (231 lb).        Physical Exam  Vitals and nursing note reviewed.   Constitutional:       General: She is awake.      Appearance: Normal appearance. She is well-developed and well-groomed.   HENT:      Head: Normocephalic and atraumatic.      Right Ear: Hearing, tympanic membrane, ear canal and external ear normal.      Left Ear: Hearing, tympanic membrane, ear canal and external ear normal.      Nose: Nose normal.      Mouth/Throat:      Lips: Pink.      Pharynx: Oropharynx is clear.   Eyes:      General: Lids are normal.      Conjunctiva/sclera: Conjunctivae normal.   Cardiovascular:      Rate and Rhythm: Normal rate and regular rhythm.      Heart sounds: Normal heart sounds.   Pulmonary:      Effort: Pulmonary effort is normal.      Breath sounds: Normal breath sounds and air entry.   Musculoskeletal:      Cervical back: Full passive range of motion without pain.      Right lower leg: No edema.      Left lower leg: No edema.   Lymphadenopathy:      Head:      Right side of head: No submental, submandibular or tonsillar adenopathy.      Left side of head: No submental, submandibular or tonsillar adenopathy.   Skin:     General: Skin is warm and dry.   Neurological:      Mental Status: She is alert.      Sensory: Sensation is intact.      Motor: Motor function is intact.      Coordination: Coordination is intact.      Gait: Gait is intact.   Psychiatric:         Attention and Perception: Attention and perception normal.         Mood and Affect: Mood and affect normal.         Speech: Speech normal.         Behavior: Behavior normal. Behavior is cooperative.         Thought Content: Thought content normal.         Cognition and Memory: Cognition and memory normal.         Judgment: Judgment normal.        Result Review :                     Assessment and Plan     Diagnoses and all orders for this visit:    1. Type 2 " diabetes, controlled, with neuropathy (Primary)  -     Hemoglobin A1c        -     Continue ozempic as prescribed    2. Primary hypertension  -     metoprolol succinate XL (TOPROL-XL) 50 MG 24 hr tablet; Take 1 tablet by mouth Daily.  Dispense: 90 tablet; Refill: 1        -     monitor bp and keep below goal of 140/90    3. Plantar wart of left foot  -     Ambulatory Referral to Podiatry    4. Vitamin D deficiency  -     Vitamin D 25 hydroxy        -     increase vit d in diet    5. COPD mixed type  -     CBC (No Diff)  -     Comprehensive metabolic panel    6. Mixed hyperlipidemia  -     Lipid panel    7. Primary insomnia  -     traZODone (DESYREL) 100 MG tablet; Take 1 tablet by mouth Every Night.  Dispense: 30 tablet; Refill: 1        -     sleep hygiene: no electronics on at bedtime; read before going to sleep     8. Nausea  -     ondansetron ODT (ZOFRAN-ODT) 4 MG disintegrating tablet; Place 1 tablet on the tongue Every 8 (Eight) Hours As Needed for Nausea or Vomiting.  Dispense: 20 tablet; Refill: 0             Follow Up     Return in about 3 months (around 4/29/2024) for Recheck.  Patient was given instructions and counseling regarding her condition or for health maintenance advice. Please see specific information pulled into the AVS if appropriate.       Transcribed from ambient dictation for Nani Adkins DNP, AMY by Janis Steel.  01/29/24   16:31 CST    Patient or patient representative verbalized consent to the visit recording.  I have personally performed the services described in this document as transcribed by the above individual, and it is both accurate and complete.    Electronically signed by Nani Adkins DNP, AMY, 01/29/24, 8:22 PM CST.

## 2024-01-30 NOTE — TELEPHONE ENCOUNTER
From: Anastasiia Rubio  To: Nani Adkins  Sent: 1/29/2024 3:55 PM CST  Subject: Tonya Chavez I hate to bother you, however I forgot to mention that you were going to up my dose of ozempic to 2mg this visit and I was wondering if we can go ahead and do that?

## 2024-02-01 ENCOUNTER — TELEPHONE (OUTPATIENT)
Dept: GASTROENTEROLOGY | Facility: CLINIC | Age: 54
End: 2024-02-01
Payer: COMMERCIAL

## 2024-02-01 NOTE — TELEPHONE ENCOUNTER
Tried to contact the patient to see what is going on if she had forgotten about the appointment for today 02/01. Left message for the patient if she needed to reschedule.

## 2024-02-24 DIAGNOSIS — E78.2 MIXED HYPERLIPIDEMIA: ICD-10-CM

## 2024-02-26 RX ORDER — ATORVASTATIN CALCIUM 40 MG/1
40 TABLET, FILM COATED ORAL NIGHTLY
Qty: 15 TABLET | Refills: 0 | Status: SHIPPED | OUTPATIENT
Start: 2024-02-26

## 2024-02-26 NOTE — TELEPHONE ENCOUNTER
Rx Refill Note  Requested Prescriptions     Pending Prescriptions Disp Refills    atorvastatin (LIPITOR) 40 MG tablet [Pharmacy Med Name: Atorvastatin Calcium 40 MG Oral Tablet] 15 tablet 0     Sig: TAKE 1 TABLET BY MOUTH ONCE DAILY AT NIGHT      Last office visit with prescribing clinician: 1/29/2024   Last telemedicine visit with prescribing clinician: 12/12/2023   Next office visit with prescribing clinician: 4/29/2024                         Would you like a call back once the refill request has been completed: [] Yes [] No    If the office needs to give you a call back, can they leave a voicemail: [] Yes [] No    Chelsea Lee MA  02/26/24, 08:35 CST

## 2024-03-01 LAB
25(OH)D3+25(OH)D2 SERPL-MCNC: 67.7 NG/ML (ref 30–100)
ALBUMIN SERPL-MCNC: 4.1 G/DL (ref 3.5–5.2)
ALBUMIN/GLOB SERPL: 2 G/DL
ALP SERPL-CCNC: 98 U/L (ref 39–117)
ALT SERPL-CCNC: 27 U/L (ref 1–33)
AST SERPL-CCNC: 27 U/L (ref 1–32)
BILIRUB SERPL-MCNC: 0.4 MG/DL (ref 0–1.2)
BUN SERPL-MCNC: 9 MG/DL (ref 6–20)
BUN/CREAT SERPL: 14.3 (ref 7–25)
CALCIUM SERPL-MCNC: 9.5 MG/DL (ref 8.6–10.5)
CHLORIDE SERPL-SCNC: 101 MMOL/L (ref 98–107)
CHOLEST SERPL-MCNC: 103 MG/DL (ref 0–200)
CO2 SERPL-SCNC: 28.4 MMOL/L (ref 22–29)
CREAT SERPL-MCNC: 0.63 MG/DL (ref 0.57–1)
EGFRCR SERPLBLD CKD-EPI 2021: 106.9 ML/MIN/1.73
ERYTHROCYTE [DISTWIDTH] IN BLOOD BY AUTOMATED COUNT: 12.6 % (ref 12.3–15.4)
GLOBULIN SER CALC-MCNC: 2.1 GM/DL
GLUCOSE SERPL-MCNC: 85 MG/DL (ref 65–99)
HBA1C MFR BLD: 5.7 % (ref 4.8–5.6)
HCT VFR BLD AUTO: 46 % (ref 34–46.6)
HDLC SERPL-MCNC: 34 MG/DL (ref 40–60)
HGB BLD-MCNC: 15.7 G/DL (ref 12–15.9)
LDLC SERPL CALC-MCNC: 36 MG/DL (ref 0–100)
MCH RBC QN AUTO: 30.9 PG (ref 26.6–33)
MCHC RBC AUTO-ENTMCNC: 34.1 G/DL (ref 31.5–35.7)
MCV RBC AUTO: 90.6 FL (ref 79–97)
PLATELET # BLD AUTO: 265 10*3/MM3 (ref 140–450)
POTASSIUM SERPL-SCNC: 3.2 MMOL/L (ref 3.5–5.2)
PROT SERPL-MCNC: 6.2 G/DL (ref 6–8.5)
RBC # BLD AUTO: 5.08 10*6/MM3 (ref 3.77–5.28)
SODIUM SERPL-SCNC: 141 MMOL/L (ref 136–145)
TRIGL SERPL-MCNC: 207 MG/DL (ref 0–150)
VLDLC SERPL CALC-MCNC: 33 MG/DL (ref 5–40)
WBC # BLD AUTO: 11.47 10*3/MM3 (ref 3.4–10.8)

## 2024-03-12 DIAGNOSIS — E78.2 MIXED HYPERLIPIDEMIA: ICD-10-CM

## 2024-03-12 RX ORDER — ATORVASTATIN CALCIUM 40 MG/1
40 TABLET, FILM COATED ORAL NIGHTLY
Qty: 90 TABLET | Refills: 0 | Status: SHIPPED | OUTPATIENT
Start: 2024-03-12

## 2024-03-12 NOTE — TELEPHONE ENCOUNTER
Rx Refill Note  Requested Prescriptions     Pending Prescriptions Disp Refills    atorvastatin (LIPITOR) 40 MG tablet [Pharmacy Med Name: Atorvastatin Calcium 40 MG Oral Tablet] 15 tablet 0     Sig: TAKE 1 TABLET BY MOUTH ONCE DAILY AT NIGHT      Last office visit with prescribing clinician: 1/29/2024   Last telemedicine visit with prescribing clinician: 12/12/2023   Next office visit with prescribing clinician: 4/29/2024                         Would you like a call back once the refill request has been completed: [] Yes [] No    If the office needs to give you a call back, can they leave a voicemail: [] Yes [] No    Amna Kruas MA  03/12/24, 08:34 CDT

## 2024-03-27 ENCOUNTER — PATIENT MESSAGE (OUTPATIENT)
Dept: FAMILY MEDICINE CLINIC | Facility: CLINIC | Age: 54
End: 2024-03-27
Payer: COMMERCIAL

## 2024-03-27 DIAGNOSIS — B37.2 YEAST INFECTION OF THE SKIN: Primary | ICD-10-CM

## 2024-03-27 NOTE — TELEPHONE ENCOUNTER
Rx Refill Note  Requested Prescriptions     Pending Prescriptions Disp Refills    albuterol sulfate HFA (ProAir HFA) 108 (90 Base) MCG/ACT inhaler 8 g 2     Sig: Inhale 2 puffs Every 4 (Four) Hours As Needed for Wheezing.      Last office visit with prescribing clinician: 4/28/2023   Last telemedicine visit with prescribing clinician: 12/12/2023   Next office visit with prescribing clinician: Visit date not found                         Would you like a call back once the refill request has been completed: [] Yes [] No    If the office needs to give you a call back, can they leave a voicemail: [] Yes [] No    Chelsea Lee MA  03/27/24, 15:33 CDT

## 2024-03-28 RX ORDER — SEMAGLUTIDE 2.68 MG/ML
INJECTION, SOLUTION SUBCUTANEOUS
Qty: 3 ML | Refills: 0 | Status: SHIPPED | OUTPATIENT
Start: 2024-03-28

## 2024-03-28 RX ORDER — BLOOD SUGAR DIAGNOSTIC
STRIP MISCELLANEOUS
Qty: 100 EACH | Refills: 5 | Status: SHIPPED | OUTPATIENT
Start: 2024-03-28

## 2024-03-28 RX ORDER — LANCETS 33 GAUGE
1 EACH MISCELLANEOUS DAILY
Qty: 100 EACH | Refills: 5 | Status: SHIPPED | OUTPATIENT
Start: 2024-03-28

## 2024-03-28 NOTE — TELEPHONE ENCOUNTER
Rx Refill Note  Requested Prescriptions     Pending Prescriptions Disp Refills    nystatin-zinc oxide 60 g 0     Sig: ystatin [id number] UNIT/GM cream-20g  zinc oxide 40 % paste- 40 g      Last office visit with prescribing clinician: 1/29/2024     Next office visit with prescribing clinician: 4/29/2024     LRX:10/2023      Radha Fajardo MA  03/28/24, 11:32 CDT

## 2024-03-28 NOTE — TELEPHONE ENCOUNTER
From: Anastasiia Rubio  To: Nani Adkins  Sent: 3/27/2024 2:53 PM CDT  Subject: Rash    Good Afternoon .. I was wondering if you could call me in some yeast infection cream? The rash is back under my boob and in my groin area.. Whatever you sent me last time worked like a dream.. Thank you and I hope you have a fabulous weekend..

## 2024-03-29 RX ORDER — ALBUTEROL SULFATE 90 UG/1
2 AEROSOL, METERED RESPIRATORY (INHALATION) EVERY 4 HOURS PRN
Qty: 8 G | Refills: 2 | Status: SHIPPED | OUTPATIENT
Start: 2024-03-29

## 2024-04-09 ENCOUNTER — OFFICE VISIT (OUTPATIENT)
Dept: GASTROENTEROLOGY | Facility: CLINIC | Age: 54
End: 2024-04-09
Payer: COMMERCIAL

## 2024-04-09 VITALS
BODY MASS INDEX: 38.98 KG/M2 | WEIGHT: 220 LBS | OXYGEN SATURATION: 96 % | SYSTOLIC BLOOD PRESSURE: 114 MMHG | HEIGHT: 63 IN | HEART RATE: 91 BPM | TEMPERATURE: 97.7 F | DIASTOLIC BLOOD PRESSURE: 64 MMHG

## 2024-04-09 DIAGNOSIS — R10.10 UPPER ABDOMINAL PAIN: Primary | ICD-10-CM

## 2024-04-09 PROCEDURE — 3074F SYST BP LT 130 MM HG: CPT | Performed by: NURSE PRACTITIONER

## 2024-04-09 PROCEDURE — 3078F DIAST BP <80 MM HG: CPT | Performed by: NURSE PRACTITIONER

## 2024-04-09 PROCEDURE — 99213 OFFICE O/P EST LOW 20 MIN: CPT | Performed by: NURSE PRACTITIONER

## 2024-04-09 NOTE — PROGRESS NOTES
[facility] GASTROENTEROLOGY - OFFICE NOTE    4/9/2024    Anastasiia Rubio   1970    Primary Physician: Nani Adkins, DNP, APRN    Chief Complaint   Patient presents with    Follow-up    Abdominal Pain         HISTORY OF PRESENT ILLNESS:    Anastasiia Rubio is a 54 y.o. female presents with abdominal pain. This is chronic for years. Occurs just above the umbilicus. Typically occurs after eating. No certain foods. Has some nausea. Tried omeprazole in the past the helped initially.  Has stress.  No unintentional weight loss. No fever. No vomiting.        She has bm every other day. No rectal bleeding. Takes stool softeners and laxative daily.         COLONOSCOPY (05/30/2023 11:35) recall 3 years.   Tissue Pathology Exam (05/30/2023 11:53) tubular adenomatous     UPPER GI ENDOSCOPY (05/30/2023 11:36) normal.             Past Medical History:   Diagnosis Date    Arthritis     COPD (chronic obstructive pulmonary disease)     Cough 09/10/2013    COVID-19     CPAP (continuous positive airway pressure) dependence     Hyperlipidemia     Hypertension     Hypothyroidism 11/18/2013    Sleep apnea     cpap with o2       Past Surgical History:   Procedure Laterality Date    BREAST AUGMENTATION      COLONOSCOPY N/A 05/30/2023    Procedure: COLONOSCOPY WITH ANESTHESIA;  Surgeon: Christopher Freeman MD;  Location: [facility] ENDOSCOPY;  Service: Gastroenterology;  Laterality: N/A;  preop; epigastric pain   postop polyp  PCP Nani adkins    CYSTOSCOPY W/ BULKING AGENT INJECTION N/A 10/09/2023    Procedure: CYSTOSCOPY WITH BULKING AGENT INJECTION;  Surgeon: Octaviano Wall MD;  Location: [facility] OR;  Service: Urology;  Laterality: N/A;    ENDOSCOPY N/A 05/30/2023    Procedure: ESOPHAGOGASTRODUODENOSCOPY WITH ANESTHESIA;  Surgeon: Christopher Freeman MD;  Location: [facility] ENDOSCOPY;  Service: Gastroenterology;  Laterality: N/A;  preop; epigastric pain  postop normal  PCP Nani adkins    LAPAROSCOPIC CHOLECYSTECTOMY      TUBAL  ABDOMINAL LIGATION         Outpatient Medications Marked as Taking for the 4/9/24 encounter (Office Visit) with Lola Wynne APRN   Medication Sig Dispense Refill    albuterol (PROVENTIL) (2.5 MG/3ML) 0.083% nebulizer solution USE 1 VIAL IN NEBULIZER EVERY 4 HOURS AS NEEDED      albuterol sulfate HFA (ProAir HFA) 108 (90 Base) MCG/ACT inhaler Inhale 2 puffs Every 4 (Four) Hours As Needed for Wheezing. 8 g 2    atorvastatin (LIPITOR) 40 MG tablet TAKE 1 TABLET BY MOUTH ONCE DAILY AT NIGHT 90 tablet 0    Blood Glucose Monitoring Suppl (OneTouch Verio Flex System) w/Device kit Test blood glucose level 1-2 times per day DX:E11.9      budesonide-formoterol (SYMBICORT) 80-4.5 MCG/ACT inhaler Inhale 2 puffs Every 6 (Six) Hours. 10.2 g 5    chlorthalidone (HYGROTON) 25 MG tablet Take 1 tablet by mouth once daily 15 tablet 0    ergocalciferol (ERGOCALCIFEROL) 1.25 MG (63407 UT) capsule Take 1 capsule by mouth 2 (Two) Times a Week. 12 capsule 5    Lancets (OneTouch Delica Plus [id number]) misc 1 each by Other route Daily. 100 each 5    levocetirizine (XYZAL) 5 MG tablet Take 1 tablet by mouth Every Evening. 90 tablet 0    meloxicam (MOBIC) 15 MG tablet Take 1 tablet by mouth Daily. 90 tablet 1    metoprolol succinate XL (TOPROL-XL) 50 MG 24 hr tablet Take 1 tablet by mouth Daily. 90 tablet 1    O2 (OXYGEN) Inhale 2 L/min 1 (One) Time. AS NEEDED, MOSTLY THROUGH NIGHT      ondansetron ODT (ZOFRAN-ODT) 4 MG disintegrating tablet Place 1 tablet on the tongue Every 8 (Eight) Hours As Needed for Nausea or Vomiting. 20 tablet 0    OneTouch Verio test strip Check FBS daily and prn for Diabetes 100 each 5    Ozempic, 2 MG/DOSE, 8 MG/3ML solution pen-injector INJECT 2 MG SUBCUTANEOUSLY ONCE A WEEK 3 mL 0    Spiriva Respimat 1.25 MCG/ACT aerosol solution inhaler Inhale 2 puffs Daily. 4 g 2       Allergies   Allergen Reactions    Metformin Other (See Comments) and Myalgia     When taking metformin she feels run down, fatigued, and body  "aches.  Off it she improves       Social History     Socioeconomic History    Marital status:    Tobacco Use    Smoking status: Every Day     Current packs/day: 1.00     Average packs/day: 1 pack/day for 35.0 years (35.0 ttl pk-yrs)     Types: Cigarettes     Passive exposure: Past    Smokeless tobacco: Never   Vaping Use    Vaping status: Never Used   Substance and Sexual Activity    Alcohol use: Never    Drug use: Never    Sexual activity: Not Currently     Partners: Male     Birth control/protection: Pill     Comment: I do not use any form of birth control       Family History   Problem Relation Age of Onset    Heart failure Mother     Diabetes Mother         2000    Lung cancer Mother     Heart disease Mother     Cancer Mother         Lung    COPD Mother     Hypertension Mother     Heart attack Father     Heart disease Father     Diabetes Father         2010    Colon cancer Father     Cancer Father         Colon    Hyperlipidemia Father     Stroke Father     Hypertension Father     Alcohol abuse Sister     Colon cancer Brother     Cancer Brother         Colon    Alcohol abuse Brother     Drug abuse Brother         1998    Cancer Paternal Grandfather         Lung Cancer       Review of Systems   Constitutional:  Negative for chills, fever and unexpected weight change.   Respiratory:  Negative for shortness of breath.    Cardiovascular:  Negative for chest pain.   Gastrointestinal:  Negative for abdominal distention, abdominal pain, anal bleeding, blood in stool, constipation, diarrhea, nausea and vomiting.        Vitals:    04/09/24 1417   BP: 114/64   Pulse: 91   Temp: 97.7 °F (36.5 °C)   SpO2: 96%   Weight: 99.8 kg (220 lb)   Height: 160 cm (63\")      Body mass index is 38.97 kg/m².    Physical Exam  Vitals reviewed.   Constitutional:       General: She is not in acute distress.  Cardiovascular:      Rate and Rhythm: Normal rate and regular rhythm.      Heart sounds: Normal heart sounds.   Pulmonary:      " Effort: Pulmonary effort is normal.      Breath sounds: Normal breath sounds.   Abdominal:      General: Bowel sounds are normal. There is no distension.      Palpations: Abdomen is soft.      Tenderness: There is no abdominal tenderness.   Skin:     General: Skin is warm and dry.   Neurological:      Mental Status: She is alert.         Results for orders placed or performed in visit on 01/29/24   CBC (No Diff)    Specimen: Blood   Result Value Ref Range    WBC 11.47 (H) 3.40 - 10.80 10*3/mm3    RBC 5.08 3.77 - 5.28 10*6/mm3    Hemoglobin 15.7 12.0 - 15.9 g/dL    Hematocrit 46.0 34.0 - 46.6 %    MCV 90.6 79.0 - 97.0 fL    MCH 30.9 26.6 - 33.0 pg    MCHC 34.1 31.5 - 35.7 g/dL    RDW 12.6 12.3 - 15.4 %    Platelets 265 140 - 450 10*3/mm3   Comprehensive metabolic panel    Specimen: Blood   Result Value Ref Range    Glucose 85 65 - 99 mg/dL    BUN 9 6 - 20 mg/dL    Creatinine 0.63 0.57 - 1.00 mg/dL    EGFR Result 106.9 >60.0 mL/min/1.73    BUN/Creatinine Ratio 14.3 7.0 - 25.0    Sodium 141 136 - 145 mmol/L    Potassium 3.2 (L) 3.5 - 5.2 mmol/L    Chloride 101 98 - 107 mmol/L    Total CO2 28.4 22.0 - 29.0 mmol/L    Calcium 9.5 8.6 - 10.5 mg/dL    Total Protein 6.2 6.0 - 8.5 g/dL    Albumin 4.1 3.5 - 5.2 g/dL    Globulin 2.1 gm/dL    A/G Ratio 2.0 g/dL    Total Bilirubin 0.4 0.0 - 1.2 mg/dL    Alkaline Phosphatase 98 39 - 117 U/L    AST (SGOT) 27 1 - 32 U/L    ALT (SGPT) 27 1 - 33 U/L   Lipid panel    Specimen: Blood   Result Value Ref Range    Total Cholesterol 103 0 - 200 mg/dL    Triglycerides 207 (H) 0 - 150 mg/dL    HDL Cholesterol 34 (L) 40 - 60 mg/dL    VLDL Cholesterol Morgan 33 5 - 40 mg/dL    LDL Chol Calc (NIH) 36 0 - 100 mg/dL   Hemoglobin A1c    Specimen: Blood   Result Value Ref Range    Hemoglobin A1C 5.70 (H) 4.80 - 5.60 %   Vitamin D 25 hydroxy    Specimen: Blood   Result Value Ref Range    25 Hydroxy, Vitamin D 67.7 30.0 - 100.0 ng/ml           ASSESSMENT AND PLAN    Assessment & Plan     Diagnoses and  all orders for this visit:    1. Upper abdominal pain (Primary)  Comments:  post prandial  Orders:  -     CT Abdomen Pelvis With Contrast; Future                 Upper abdominal pain is chronic.  I question if this could be related to irritable bowel syndrome.  I recommend trial of FODMAP diet.  Also discussed checking CT scan of the abdomen and pelvis.  No plans to repeat EGD or colonoscopy as she had these both last year.  If CT is negative then consider trial of Bentyl.  I will contact her with results of CT scan.  Office visit in 6 weeks.        * Surgery not found *           Return in about 6 weeks (around 5/21/2024).          There are no Patient Instructions on file for this visit.      AMY Steven

## 2024-04-10 ENCOUNTER — TELEPHONE (OUTPATIENT)
Dept: GASTROENTEROLOGY | Facility: CLINIC | Age: 54
End: 2024-04-10
Payer: COMMERCIAL

## 2024-04-11 ENCOUNTER — TELEPHONE (OUTPATIENT)
Dept: GASTROENTEROLOGY | Facility: CLINIC | Age: 54
End: 2024-04-11
Payer: COMMERCIAL

## 2024-04-12 ENCOUNTER — PREP FOR SURGERY (OUTPATIENT)
Dept: OTHER | Facility: HOSPITAL | Age: 54
End: 2024-04-12
Payer: COMMERCIAL

## 2024-04-17 ENCOUNTER — TELEPHONE (OUTPATIENT)
Dept: GASTROENTEROLOGY | Facility: CLINIC | Age: 54
End: 2024-04-17
Payer: COMMERCIAL

## 2024-04-24 NOTE — TELEPHONE ENCOUNTER
Patient has been on semalutide 4mg in December. Medication increased on 01/30/2024. I'm pretty sure that this is max dose. Please review.     Rx Refill Note  Requested Prescriptions     Pending Prescriptions Disp Refills    Ozempic, 2 MG/DOSE, 8 MG/3ML solution pen-injector [Pharmacy Med Name: Ozempic (2 MG/DOSE) 8 MG/3ML Subcutaneous Solution Pen-injector] 3 mL 0     Sig: INJECT 2 MG SUBCUTANEOUSLY ONCE A WEEK      Last office visit with prescribing clinician: 1/29/2024   Last telemedicine visit with prescribing clinician: 12/12/2023   Next office visit with prescribing clinician: 4/29/2024                         Would you like a call back once the refill request has been completed: [] Yes [] No    If the office needs to give you a call back, can they leave a voicemail: [] Yes [] No    Vilma Wynne LPN  04/24/24, 14:52 CDT

## 2024-04-26 RX ORDER — SEMAGLUTIDE 2.68 MG/ML
INJECTION, SOLUTION SUBCUTANEOUS
Qty: 3 ML | Refills: 0 | Status: SHIPPED | OUTPATIENT
Start: 2024-04-26 | End: 2024-04-29 | Stop reason: SDUPTHER

## 2024-04-29 ENCOUNTER — TELEPHONE (OUTPATIENT)
Dept: GASTROENTEROLOGY | Facility: CLINIC | Age: 54
End: 2024-04-29
Payer: COMMERCIAL

## 2024-04-29 ENCOUNTER — OFFICE VISIT (OUTPATIENT)
Dept: FAMILY MEDICINE CLINIC | Facility: CLINIC | Age: 54
End: 2024-04-29
Payer: COMMERCIAL

## 2024-04-29 VITALS
BODY MASS INDEX: 38.45 KG/M2 | RESPIRATION RATE: 18 BRPM | SYSTOLIC BLOOD PRESSURE: 111 MMHG | TEMPERATURE: 97.8 F | HEART RATE: 87 BPM | HEIGHT: 63 IN | OXYGEN SATURATION: 99 % | WEIGHT: 217 LBS | DIASTOLIC BLOOD PRESSURE: 76 MMHG

## 2024-04-29 DIAGNOSIS — E11.65 CONTROLLED TYPE 2 DIABETES MELLITUS WITH HYPERGLYCEMIA, WITHOUT LONG-TERM CURRENT USE OF INSULIN: ICD-10-CM

## 2024-04-29 DIAGNOSIS — I10 PRIMARY HYPERTENSION: Primary | ICD-10-CM

## 2024-04-29 DIAGNOSIS — M25.552 LEFT HIP PAIN: ICD-10-CM

## 2024-04-29 DIAGNOSIS — G47.39 OTHER SLEEP APNEA: ICD-10-CM

## 2024-04-29 DIAGNOSIS — R11.0 NAUSEA: ICD-10-CM

## 2024-04-29 DIAGNOSIS — B37.2 YEAST DERMATITIS: ICD-10-CM

## 2024-04-29 DIAGNOSIS — M54.32 SCIATICA OF LEFT SIDE: ICD-10-CM

## 2024-04-29 PROCEDURE — 1160F RVW MEDS BY RX/DR IN RCRD: CPT | Performed by: NURSE PRACTITIONER

## 2024-04-29 PROCEDURE — 1159F MED LIST DOCD IN RCRD: CPT | Performed by: NURSE PRACTITIONER

## 2024-04-29 PROCEDURE — 3078F DIAST BP <80 MM HG: CPT | Performed by: NURSE PRACTITIONER

## 2024-04-29 PROCEDURE — 3074F SYST BP LT 130 MM HG: CPT | Performed by: NURSE PRACTITIONER

## 2024-04-29 PROCEDURE — 99214 OFFICE O/P EST MOD 30 MIN: CPT | Performed by: NURSE PRACTITIONER

## 2024-04-29 PROCEDURE — 3044F HG A1C LEVEL LT 7.0%: CPT | Performed by: NURSE PRACTITIONER

## 2024-04-29 RX ORDER — CHLORTHALIDONE 25 MG/1
25 TABLET ORAL DAILY
Qty: 15 TABLET | Refills: 0 | Status: SHIPPED | OUTPATIENT
Start: 2024-04-29

## 2024-04-29 RX ORDER — SEMAGLUTIDE 2.68 MG/ML
2 INJECTION, SOLUTION SUBCUTANEOUS WEEKLY
Qty: 3 ML | Refills: 0 | Status: SHIPPED | OUTPATIENT
Start: 2024-04-29

## 2024-04-29 RX ORDER — METOPROLOL SUCCINATE 50 MG/1
50 TABLET, EXTENDED RELEASE ORAL DAILY
Qty: 90 TABLET | Refills: 1 | Status: SHIPPED | OUTPATIENT
Start: 2024-04-29

## 2024-04-29 RX ORDER — ONDANSETRON 4 MG/1
4 TABLET, ORALLY DISINTEGRATING ORAL EVERY 8 HOURS PRN
Qty: 20 TABLET | Refills: 0 | Status: SHIPPED | OUTPATIENT
Start: 2024-04-29

## 2024-04-29 RX ORDER — MELOXICAM 15 MG/1
15 TABLET ORAL DAILY
Qty: 90 TABLET | Refills: 1 | Status: SHIPPED | OUTPATIENT
Start: 2024-04-29

## 2024-04-29 RX ORDER — DICYCLOMINE HYDROCHLORIDE 10 MG/1
10 CAPSULE ORAL EVERY 6 HOURS PRN
Qty: 60 CAPSULE | Refills: 3 | Status: SHIPPED | OUTPATIENT
Start: 2024-04-29

## 2024-04-29 RX ORDER — AZELASTINE HCL 205.5 UG/1
2 SPRAY NASAL 2 TIMES DAILY
Qty: 30 ML | Refills: 2 | Status: SHIPPED | OUTPATIENT
Start: 2024-04-29

## 2024-04-29 RX ORDER — NYSTATIN 100000 [USP'U]/G
POWDER TOPICAL 3 TIMES DAILY
Qty: 1 EACH | Refills: 2 | Status: SHIPPED | OUTPATIENT
Start: 2024-04-29

## 2024-04-29 NOTE — TELEPHONE ENCOUNTER
CONTACTED THE PATIENT TO SCHEDULE A 1 MONTH FOLLOW UP WITH DIEGO REYES. HAD TO LEAVE A MESSAGE FOR THE PATIENT TO CONTACT THE OFFICE TO SCHEDULE APPOINTMENT.

## 2024-04-29 NOTE — TELEPHONE ENCOUNTER
I did a peer to peer with her insurance company physician. They have declined paying for ct abdomen/pelvis.  I contacted the patient and explained. I recommend trial of bentyl that we had briefly discussed at her last office visit.  Discussed some adverse effects of bentyl.  I recommend office visit 1 month.

## 2024-04-29 NOTE — PROGRESS NOTES
Chief Complaint  Diabetes (Pt here for follow up)    Subjective        Anastasiia Rubio presents to [facility]  History of Present Illness  History of Present Illness  The patient is a 54-year-old female here for multiple chronic problems. First problem, diabetes, it is a follow-up type 2. She has had it more than a year, it is stable.    The patient denies experiencing any chest pain, shortness of breath, or blurred vision. She also denies experiencing polydipsia, polyphagia, or polyuria. Her symptom course remains stable, with no instances of hypoglycemia. She denies experiencing dizziness, sleepiness, confusion, syncope, or hospitalizations due to diabetic complications. She also denies any heart disease, PVD, retinopathy, CAD risk, diabetes, dyslipidemia, and hypertension. Her family history includes obesity, postmenopausal status, and a sedentary life. She is currently on Ozempic 2.5 for her diabetes, which has resulted in weight loss and a healthier diet. Her BMI has decreased from 40.9 to 38.4. She weighed 231 in 01/2024, 220 in 04/2024, and currently weighs 217. She has not had a dietitian visit. She reports increased physical activity than before. Her last ophthalmological examination was approximately a year ago.    The patient denies experiencing chest pain or leg pain.    The patient denies experiencing any exacerbating diseases such as liver disease or nephrotic syndrome. She does have diabetes and experiences myalgias. She is currently on a statin with mild improvement, with no compliance problems. Her CAD risk remains the same as diabetes and hypertension.    The patient reports chronic heartburn, which has been present for more than a year. She has eliminated beef from her diet in an attempt to alleviate her symptoms, but reports no improvement. She has been experiencing abdominal pain for at least 5 years. She has undergone both a colonoscopy and endoscopy, which  "revealed some cancerous polyps, which were removed. She was prescribed a new medication for her IBS by her gastroenterologist. She takes Zofran for nausea and requests a refill. She is not currently taking Protonix.    The patient denies experiencing nervousness, anxiety, panic, or restlessness. Her frequency is moderate on most days.    The patient reports sleep apnea and uses a CPAP with oxygen at night. She has had lifestyle changes with moderate improvement, with no compliance problems, pharmacy problems, or insurance issues. She reports no side effects.    The patient reports chronic insomnia, which has been present for more than a year. The frequency is constant. She denies any change in bowel habits, joint swelling, fever, or sore throat. There are no aggravating factors. She does type some for sleep and she does wear oxygen with her sleep apnea machine with mild improvement. She uses the sleep apnea 5 to 6 times a week.    She is having problems with back pain and shoulder pain and I think it is caused by her breast implants.  She complains of pulling down on her shoulders and pain in the upper back and shoulder area.  She also has a continuous rash under her breasts that is yeast dermatitis.      Objective   Vital Signs:  /76 (BP Location: Right arm, Patient Position: Sitting, Cuff Size: Large Adult)   Pulse 87   Temp 97.8 °F (36.6 °C) (Infrared)   Resp 18   Ht 160 cm (63\") Comment: per patient  Wt 98.4 kg (217 lb)   SpO2 99%   BMI 38.44 kg/m²   Estimated body mass index is 38.44 kg/m² as calculated from the following:    Height as of this encounter: 160 cm (63\").    Weight as of this encounter: 98.4 kg (217 lb).       Class 2 Severe Obesity (BMI >=35 and <=39.9). Obesity-related health conditions include the following: hypertension, diabetes mellitus, dyslipidemias, and GERD. Obesity is improving with lifestyle modifications. BMI is is above average; BMI management plan is completed. We " discussed portion control and increasing exercise.        Physical Exam  Vitals and nursing note reviewed.   Constitutional:       General: She is awake.      Appearance: Normal appearance. She is well-developed and well-groomed.   HENT:      Head: Normocephalic and atraumatic.      Right Ear: Hearing, tympanic membrane, ear canal and external ear normal.      Left Ear: Hearing, tympanic membrane, ear canal and external ear normal.      Nose: Nose normal.      Mouth/Throat:      Lips: Pink.      Pharynx: Oropharynx is clear.   Eyes:      General: Lids are normal.      Conjunctiva/sclera: Conjunctivae normal.   Cardiovascular:      Rate and Rhythm: Normal rate and regular rhythm.      Heart sounds: Normal heart sounds.   Pulmonary:      Effort: Pulmonary effort is normal.      Breath sounds: Normal breath sounds and air entry.   Musculoskeletal:      Cervical back: Full passive range of motion without pain.      Right lower leg: No edema.      Left lower leg: No edema.   Lymphadenopathy:      Head:      Right side of head: No submental, submandibular or tonsillar adenopathy.      Left side of head: No submental, submandibular or tonsillar adenopathy.   Skin:     General: Skin is warm and dry.   Neurological:      Mental Status: She is alert.      Sensory: Sensation is intact.      Motor: Motor function is intact.      Coordination: Coordination is intact.      Gait: Gait is intact.   Psychiatric:         Attention and Perception: Attention and perception normal.         Mood and Affect: Mood and affect normal.         Speech: Speech normal.         Behavior: Behavior normal. Behavior is cooperative.         Thought Content: Thought content normal.         Cognition and Memory: Cognition and memory normal.         Judgment: Judgment normal.        Physical Exam      Result Review :          Results  Laboratory Studies  A1c was 5.7. Cholesterol was mildly elevated at 103. LDL was normal at 36. Triglycerides were 207.  Good cholesterol was 34.           Assessment and Plan     Diagnoses and all orders for this visit:    1. Primary hypertension (Primary)  -     metoprolol succinate XL (TOPROL-XL) 50 MG 24 hr tablet; Take 1 tablet by mouth Daily.  Dispense: 90 tablet; Refill: 1  -     chlorthalidone (HYGROTON) 25 MG tablet; Take 1 tablet by mouth Daily.  Dispense: 15 tablet; Refill: 0    2. Controlled type 2 diabetes mellitus with hyperglycemia, without long-term current use of insulin  -     Semaglutide, 2 MG/DOSE, (Ozempic, 2 MG/DOSE,) 8 MG/3ML solution pen-injector; Inject 2 mg under the skin into the appropriate area as directed 1 (One) Time Per Week.  Dispense: 3 mL; Refill: 0    3. Nausea  -     ondansetron ODT (ZOFRAN-ODT) 4 MG disintegrating tablet; Place 1 tablet on the tongue Every 8 (Eight) Hours As Needed for Nausea or Vomiting.  Dispense: 20 tablet; Refill: 0    4. Left hip pain  -     meloxicam (MOBIC) 15 MG tablet; Take 1 tablet by mouth Daily.  Dispense: 90 tablet; Refill: 1    5. Sciatica of left side  -     meloxicam (MOBIC) 15 MG tablet; Take 1 tablet by mouth Daily.  Dispense: 90 tablet; Refill: 1    6. Other sleep apnea    7. Yeast dermatitis  -     nystatin (MYCOSTATIN) 754975 UNIT/GM powder; Apply  topically to the appropriate area as directed 3 (Three) Times a Day.  Dispense: 1 each; Refill: 2    Other orders  -     azelastine (ASTEPRO) 0.15 % solution nasal spray; 2 sprays into the nostril(s) as directed by provider 2 (Two) Times a Day.  Dispense: 30 mL; Refill: 2      Assessment & Plan  1. Hypertension.  The patient is advised to persist with the prescribed regimen of hydralazine and metoprolol.    2. Hyperlipidemia.  The patient's BMI has decreased from 40.9 to 38.4. Her weight has decreased from 231 in 01/2024 to 220 in 04/2024 to 217 today. The patient is advised to continue with the prescribed regimen of atorvastatin 40. She is also advised to incorporate baked foods instead of fried or fast foods into  her diet.    3. IBS.  The patient is to continue with the prescribed dicyclomine 10 mg.    4. Vitamin D deficiency.  The patient's vitamin D levels are well-managed. The patient is advised to continue the prescribed ergocalciferol.    5. Allergies.  The patient is to continue with the prescribed Xyzal. Astelin nasal spray has been prescribed to potentially alleviate her symptoms.    6. Pain.  The patient is currently on meloxicam for pain management.    7. Sleep apnea.  The patient uses a CPAP and oxygen during sleep.    8. GERD.  The patient is to continue with the prescribed pantoprazole 40 mg.    9. Muscle spasms and muscle pain.  The patient's muscle pain is well-managed. The patient is advised to take tizanidine 4 mg as needed.    10. Insomnia.  The patient is not currently on any medication for insomnia. If the nasal spray proves ineffective, the patient is to inform us, and we will refer her to an ENT specialist.      ICD-10-CM ICD-9-CM   1. Other sleep apnea  G47.39 327.29   2. Nausea  R11.0 787.02   3. Primary hypertension  I10 401.9   4. Left hip pain  M25.552 719.45   5. Sciatica of left side  M54.32 724.3   6. Controlled type 2 diabetes mellitus with hyperglycemia, without long-term current use of insulin  E11.65 250.80     790.29                Follow Up     Return in about 3 months (around 7/29/2024) for Recheck.  Patient was given instructions and counseling regarding her condition or for health maintenance advice. Please see specific information pulled into the AVS if appropriate.       Patient or patient representative verbalized consent for the use of Ambient Listening during the visit with  Nani Adknis DNP, AMY for chart documentation. 4/29/2024  14:39 CDT    Electronically signed by Nani Adkins DNP, AMY, 04/29/24, 2:40 PM CDT.    Answers submitted by the patient for this visit:  Other (Submitted on 4/29/2024)  Please describe your symptoms.: Stomach ache  Have you had these  symptoms before?: Yes  How long have you been having these symptoms?: Greater than 2 weeks  Please list any medications you are currently taking for this condition.: No  Please describe any probable cause for these symptoms. : No  Primary Reason for Visit (Submitted on 4/29/2024)  What is the primary reason for your visit?: Other    Electronically signed by Nani Adkins DNP, AMY, 04/29/24, 4:38 PM CDT.

## 2024-05-29 ENCOUNTER — PATIENT MESSAGE (OUTPATIENT)
Dept: FAMILY MEDICINE CLINIC | Facility: CLINIC | Age: 54
End: 2024-05-29
Payer: COMMERCIAL

## 2024-05-30 ENCOUNTER — OFFICE VISIT (OUTPATIENT)
Dept: FAMILY MEDICINE CLINIC | Facility: CLINIC | Age: 54
End: 2024-05-30
Payer: COMMERCIAL

## 2024-05-30 ENCOUNTER — TELEPHONE (OUTPATIENT)
Dept: FAMILY MEDICINE CLINIC | Facility: CLINIC | Age: 54
End: 2024-05-30
Payer: COMMERCIAL

## 2024-05-30 VITALS
TEMPERATURE: 99.3 F | RESPIRATION RATE: 24 BRPM | HEIGHT: 63 IN | WEIGHT: 217 LBS | HEART RATE: 84 BPM | DIASTOLIC BLOOD PRESSURE: 71 MMHG | OXYGEN SATURATION: 92 % | BODY MASS INDEX: 38.45 KG/M2 | SYSTOLIC BLOOD PRESSURE: 110 MMHG

## 2024-05-30 DIAGNOSIS — J44.1 COPD WITH EXACERBATION: Primary | ICD-10-CM

## 2024-05-30 PROCEDURE — 99214 OFFICE O/P EST MOD 30 MIN: CPT | Performed by: FAMILY MEDICINE

## 2024-05-30 PROCEDURE — 3074F SYST BP LT 130 MM HG: CPT | Performed by: FAMILY MEDICINE

## 2024-05-30 PROCEDURE — 1126F AMNT PAIN NOTED NONE PRSNT: CPT | Performed by: FAMILY MEDICINE

## 2024-05-30 PROCEDURE — 3078F DIAST BP <80 MM HG: CPT | Performed by: FAMILY MEDICINE

## 2024-05-30 PROCEDURE — 3044F HG A1C LEVEL LT 7.0%: CPT | Performed by: FAMILY MEDICINE

## 2024-05-30 RX ORDER — CEFDINIR 300 MG/1
300 CAPSULE ORAL 2 TIMES DAILY
Qty: 10 CAPSULE | Refills: 0 | Status: SHIPPED | OUTPATIENT
Start: 2024-05-30

## 2024-05-30 RX ORDER — METHYLPREDNISOLONE 4 MG/1
TABLET ORAL
Qty: 21 TABLET | Refills: 0 | Status: SHIPPED | OUTPATIENT
Start: 2024-05-30

## 2024-05-30 RX ORDER — ALBUTEROL SULFATE 90 UG/1
2 AEROSOL, METERED RESPIRATORY (INHALATION) EVERY 4 HOURS PRN
Qty: 8 G | Refills: 11 | Status: SHIPPED | OUTPATIENT
Start: 2024-05-30

## 2024-05-30 NOTE — PROGRESS NOTES
"Chief Complaint  Cough (Patient here for cough. /)    Subjective        Anastasiia Rubio presents to [facility]  History of Present Illness  Mrs. Rubio presents today for a sick visit.  She started feeling bad 3 or 4 days ago.  She initially had headache.  She has cough and SOA.  She is having a hard time coughing anything up.  She is wheezing.  She has a history of COPD.      Objective   Vital Signs:  /71 (BP Location: Right arm, Patient Position: Sitting, Cuff Size: Adult)   Pulse 84   Temp 99.3 °F (37.4 °C) (Infrared)   Resp 24   Ht 160 cm (63\")   Wt 98.4 kg (217 lb)   SpO2 92%   BMI 38.44 kg/m²   Estimated body mass index is 38.44 kg/m² as calculated from the following:    Height as of this encounter: 160 cm (63\").    Weight as of this encounter: 98.4 kg (217 lb).               Physical Exam  Constitutional:       Appearance: Normal appearance. She is well-developed.   HENT:      Head: Normocephalic and atraumatic.      Right Ear: Tympanic membrane, ear canal and external ear normal.      Left Ear: Tympanic membrane, ear canal and external ear normal.      Nose: Nose normal.      Mouth/Throat:      Mouth: Mucous membranes are moist.      Pharynx: Oropharynx is clear.   Eyes:      Extraocular Movements: Extraocular movements intact.      Conjunctiva/sclera: Conjunctivae normal.      Pupils: Pupils are equal, round, and reactive to light.   Cardiovascular:      Rate and Rhythm: Normal rate and regular rhythm.      Heart sounds: Normal heart sounds.   Pulmonary:      Effort: Pulmonary effort is normal.      Breath sounds: Decreased breath sounds and wheezing present.   Abdominal:      General: Bowel sounds are normal. There is no distension.      Palpations: Abdomen is soft.      Tenderness: There is no abdominal tenderness.   Musculoskeletal:         General: Normal range of motion.      Cervical back: Normal range of motion and neck supple.   Skin:     General: Skin is " warm and dry.   Neurological:      Mental Status: She is alert and oriented to person, place, and time.   Psychiatric:         Mood and Affect: Mood normal.         Speech: Speech normal.         Behavior: Behavior normal.         Thought Content: Thought content normal.         Judgment: Judgment normal.            Result Review :                 Assessment and Plan     Diagnoses and all orders for this visit:    1. COPD with exacerbation (Primary)  -     methylPREDNISolone (MEDROL) 4 MG dose pack; Take as directed on package instructions.  Dispense: 21 tablet; Refill: 0  -     cefdinir (OMNICEF) 300 MG capsule; Take 1 capsule by mouth 2 (Two) Times a Day.  Dispense: 10 capsule; Refill: 0  -     albuterol sulfate HFA (ProAir HFA) 108 (90 Base) MCG/ACT inhaler; Inhale 2 puffs Every 4 (Four) Hours As Needed for Wheezing.  Dispense: 8 g; Refill: 11    Acute exacerbation of chronic COPD  Will do a course of PO abx and PO Steroids  Follow up if worsening or failing to improve       Follow Up     No follow-ups on file.  Patient was given instructions and counseling regarding her condition or for health maintenance advice. Please see specific information pulled into the AVS if appropriate.

## 2024-06-21 DIAGNOSIS — E11.65 CONTROLLED TYPE 2 DIABETES MELLITUS WITH HYPERGLYCEMIA, WITHOUT LONG-TERM CURRENT USE OF INSULIN: ICD-10-CM

## 2024-06-21 RX ORDER — SEMAGLUTIDE 2.68 MG/ML
INJECTION, SOLUTION SUBCUTANEOUS
Qty: 3 ML | Refills: 0 | Status: SHIPPED | OUTPATIENT
Start: 2024-06-21

## 2024-06-24 ENCOUNTER — OFFICE VISIT (OUTPATIENT)
Dept: FAMILY MEDICINE CLINIC | Facility: CLINIC | Age: 54
End: 2024-06-24
Payer: COMMERCIAL

## 2024-06-24 VITALS
BODY MASS INDEX: 38.8 KG/M2 | DIASTOLIC BLOOD PRESSURE: 62 MMHG | OXYGEN SATURATION: 99 % | SYSTOLIC BLOOD PRESSURE: 94 MMHG | HEIGHT: 63 IN | TEMPERATURE: 98.6 F | RESPIRATION RATE: 18 BRPM | HEART RATE: 88 BPM | WEIGHT: 219 LBS

## 2024-06-24 DIAGNOSIS — L63.9 ALOPECIA AREATA: Primary | ICD-10-CM

## 2024-06-24 PROCEDURE — 3044F HG A1C LEVEL LT 7.0%: CPT | Performed by: NURSE PRACTITIONER

## 2024-06-24 PROCEDURE — 1126F AMNT PAIN NOTED NONE PRSNT: CPT | Performed by: NURSE PRACTITIONER

## 2024-06-24 PROCEDURE — 1160F RVW MEDS BY RX/DR IN RCRD: CPT | Performed by: NURSE PRACTITIONER

## 2024-06-24 PROCEDURE — 99214 OFFICE O/P EST MOD 30 MIN: CPT | Performed by: NURSE PRACTITIONER

## 2024-06-24 PROCEDURE — 3078F DIAST BP <80 MM HG: CPT | Performed by: NURSE PRACTITIONER

## 2024-06-24 PROCEDURE — 1159F MED LIST DOCD IN RCRD: CPT | Performed by: NURSE PRACTITIONER

## 2024-06-24 PROCEDURE — 3074F SYST BP LT 130 MM HG: CPT | Performed by: NURSE PRACTITIONER

## 2024-06-24 RX ORDER — CLOBETASOL PROPIONATE 0.46 MG/ML
SOLUTION TOPICAL 2 TIMES DAILY
Qty: 50 ML | Refills: 1 | Status: SHIPPED | OUTPATIENT
Start: 2024-06-24

## 2024-06-24 NOTE — PROGRESS NOTES
"Chief Complaint  Alopecia    Subjective        Anastasiia Rubio presents to [facility]  History of Present Illness  History of Present Illness  The patient is a 54-year-old female who is here for alopecia.    The patient reports intermittent hair loss, with only a few strands. The hair loss is exclusively falling out in clumps during hair washing and brushing. At present, the severity of the hair loss is minimal, however, it is noticeable when the hair is touched. The onset of these symptoms was a few months ago. Initially, the condition was manageable, but it has since escalated, to the extent that she has to remove all hair from her hands after showering.    The following portions of the patient's history were reviewed and updated as appropriate: allergies, current medications, past family history, past medical history, past social history, past surgical history and problem list.    Objective   Vital Signs:  BP 94/62 (BP Location: Left arm, Patient Position: Sitting, Cuff Size: Large Adult)   Pulse 88   Temp 98.6 °F (37 °C) (Infrared)   Resp 18   Ht 160 cm (63\") Comment: per patient  Wt 99.3 kg (219 lb)   SpO2 99%   BMI 38.79 kg/m²   Estimated body mass index is 38.79 kg/m² as calculated from the following:    Height as of this encounter: 160 cm (63\").    Weight as of this encounter: 99.3 kg (219 lb).       Class 2 Severe Obesity (BMI >=35 and <=39.9). Obesity-related health conditions include the following: dyslipidemias. Obesity is unchanged. BMI is is above average; BMI management plan is completed. We discussed portion control and increasing exercise.    Physical Exam  Vitals and nursing note reviewed.   Constitutional:       General: She is awake.      Appearance: Normal appearance. She is well-developed and well-groomed.   HENT:      Head: Normocephalic and atraumatic.      Comments: Her hair is thinning, and when I do the pull test I get a handful of hair The hairline is " also receeding      Right Ear: Hearing, tympanic membrane, ear canal and external ear normal.      Left Ear: Hearing, tympanic membrane, ear canal and external ear normal.      Nose: Nose normal.      Mouth/Throat:      Lips: Pink.      Pharynx: Oropharynx is clear.   Eyes:      General: Lids are normal.      Conjunctiva/sclera: Conjunctivae normal.   Cardiovascular:      Rate and Rhythm: Normal rate and regular rhythm.      Heart sounds: Normal heart sounds.   Pulmonary:      Effort: Pulmonary effort is normal.      Breath sounds: Normal breath sounds and air entry.   Musculoskeletal:      Cervical back: Full passive range of motion without pain.      Right lower leg: No edema.      Left lower leg: No edema.   Lymphadenopathy:      Head:      Right side of head: No submental, submandibular or tonsillar adenopathy.      Left side of head: No submental, submandibular or tonsillar adenopathy.   Skin:     General: Skin is warm and dry.   Neurological:      Mental Status: She is alert.      Sensory: Sensation is intact.      Motor: Motor function is intact.      Coordination: Coordination is intact.      Gait: Gait is intact.   Psychiatric:         Attention and Perception: Attention and perception normal.         Mood and Affect: Mood and affect normal.         Speech: Speech normal.         Behavior: Behavior normal. Behavior is cooperative.         Thought Content: Thought content normal.         Cognition and Memory: Cognition and memory normal.         Judgment: Judgment normal.        Physical Exam      Result Review :          Results  Laboratory Studies  Hemoglobin A1c was 5.7.           Assessment and Plan     Diagnoses and all orders for this visit:    1. Alopecia areata (Primary)  -     Thyroid Peroxidase Antibody; Future  -     T3, free; Future  -     T4; Future  -     TSH; Future  -     Ferritin; Future  -     Comprehensive metabolic panel; Future  -     Testosterone, Free, Total; Future  -      DHEA-Sulfate; Future  -     clobetasol (TEMOVATE) 0.05 % external solution; Apply  topically to the appropriate area as directed 2 (Two) Times a Day.  Dispense: 50 mL; Refill: 1      Assessment & Plan  1. Alopecia.  A comprehensive thyroid panel, including TSH, T3, T4, thyroid peroxidase, ferritin, DHEA, and testosterone, will be conducted to rule out any hormonal deficiencies. A prescription for Temovate external solution, to be applied sparingly to the affected areas twice daily, has been issued.      ICD-10-CM ICD-9-CM   1. Alopecia areata  L63.9 704.01                Follow Up     Return in about 1 month (around 7/24/2024) for Recheck.  Patient was given instructions and counseling regarding her condition or for health maintenance advice. Please see specific information pulled into the AVS if appropriate.       Patient or patient representative verbalized consent for the use of Ambient Listening during the visit with  Nani Adkins DNP, APRN for chart documentation. 6/24/2024  17:40 CDT    Electronically signed by Nani Adkins DNP, APRN, 06/24/24, 5:40 PM CDT.

## 2024-07-03 ENCOUNTER — OFFICE VISIT (OUTPATIENT)
Dept: GASTROENTEROLOGY | Facility: CLINIC | Age: 54
End: 2024-07-03
Payer: COMMERCIAL

## 2024-07-03 VITALS
HEIGHT: 62 IN | SYSTOLIC BLOOD PRESSURE: 118 MMHG | OXYGEN SATURATION: 95 % | WEIGHT: 216 LBS | DIASTOLIC BLOOD PRESSURE: 70 MMHG | BODY MASS INDEX: 39.75 KG/M2 | TEMPERATURE: 97.5 F | HEART RATE: 77 BPM

## 2024-07-03 DIAGNOSIS — R10.10 UPPER ABDOMINAL PAIN: Primary | ICD-10-CM

## 2024-07-03 DIAGNOSIS — R11.0 NAUSEA: ICD-10-CM

## 2024-07-03 RX ORDER — ONDANSETRON 4 MG/1
4 TABLET, ORALLY DISINTEGRATING ORAL EVERY 8 HOURS PRN
Qty: 20 TABLET | Refills: 1 | Status: SHIPPED | OUTPATIENT
Start: 2024-07-03

## 2024-07-03 NOTE — PROGRESS NOTES
[facility]STRO[facility] - OFFICE NOTE    7/3/2024    Anastasiia Rubio   1970    Primary Physician: Nani Adkins, DNP, APRN    Chief Complaint   Patient presents with    Abdominal Pain         HISTORY OF PRESENT ILLNESS:     Anastasiia Rubio is a 54 y.o. female presents for f/u chronic abdominal pain. I prescribed bentyl and that has helped. She still has some episodes but it is not daily as before. Taking bentyl in am and pm ( not prior to eating). She quit drinking sodas. Trying to eat healthier. Some nausea.  No unintentional weight loss. No fever. No n/v.   .       Taking laxative every night x 3 mo. Having bm daily. No rectal bleeding.             ========================================================================  Office visit  4-9-24  HPI  Anastasiia Rubio is a 54 y.o. female presents with abdominal pain. This is chronic for years. Occurs just above the umbilicus. Typically occurs after eating. No certain foods. Has some nausea. Tried omeprazole in the past the helped initially.  Has stress.  No unintentional weight loss. No fever. No vomiting.          She has bm every other day. No rectal bleeding. Takes stool softeners and laxative daily.            COLONOSCOPY (05/30/2023 11:35) recall 3 years.   Tissue Pathology Exam (05/30/2023 11:53) tubular adenomatous      UPPER GI ENDOSCOPY (05/30/2023 11:36) normal.     Past Medical History:   Diagnosis Date    Arthritis     Colon polyp     COPD (chronic obstructive pulmonary disease)     Cough 09/10/2013    COVID-19     CPAP (continuous positive airway pressure) dependence     Diabetes mellitus     GERD (gastroesophageal reflux disease)     Hyperlipidemia     Hypertension     Hypothyroidism 11/18/2013    Obesity     Sleep apnea     cpap with o2       Past Surgical History:   Procedure Laterality Date    BREAST AUGMENTATION      COLONOSCOPY N/A 05/30/2023    Procedure: COLONOSCOPY WITH ANESTHESIA;  Surgeon: Christopher Freeman MD;  Location: [facility]  ENDOSCOPY;  Service: Gastroenterology;  Laterality: N/A;  preop; epigastric pain   postop polyp  PCP Nani denis    CYSTOSCOPY W/ BULKING AGENT INJECTION N/A 10/09/2023    Procedure: CYSTOSCOPY WITH BULKING AGENT INJECTION;  Surgeon: Octaviano Wall MD;  Location: [facility] OR;  Service: Urology;  Laterality: N/A;    ENDOSCOPY N/A 05/30/2023    Procedure: ESOPHAGOGASTRODUODENOSCOPY WITH ANESTHESIA;  Surgeon: Christopher Freeman MD;  Location: [facility] ENDOSCOPY;  Service: Gastroenterology;  Laterality: N/A;  preop; epigastric pain  postop normal  PCP Nani denis    LAPAROSCOPIC CHOLECYSTECTOMY      TUBAL ABDOMINAL LIGATION         Outpatient Medications Marked as Taking for the 7/3/24 encounter (Office Visit) with Lola Wynne APRN   Medication Sig Dispense Refill    albuterol (PROVENTIL) (2.5 MG/3ML) 0.083% nebulizer solution USE 1 VIAL IN NEBULIZER EVERY 4 HOURS AS NEEDED      albuterol sulfate HFA (ProAir HFA) 108 (90 Base) MCG/ACT inhaler Inhale 2 puffs Every 4 (Four) Hours As Needed for Wheezing. 8 g 11    atorvastatin (LIPITOR) 40 MG tablet TAKE 1 TABLET BY MOUTH ONCE DAILY AT NIGHT 90 tablet 0    azelastine (ASTEPRO) 0.15 % solution nasal spray 2 sprays into the nostril(s) as directed by provider 2 (Two) Times a Day. 30 mL 2    Blood Glucose Monitoring Suppl (OneTouch Verio Flex System) w/Device kit Test blood glucose level 1-2 times per day DX:E11.9      budesonide-formoterol (SYMBICORT) 80-4.5 MCG/ACT inhaler Inhale 2 puffs Every 6 (Six) Hours. 10.2 g 5    chlorthalidone (HYGROTON) 25 MG tablet Take 1 tablet by mouth Daily. 15 tablet 0    clobetasol (TEMOVATE) 0.05 % external solution Apply  topically to the appropriate area as directed 2 (Two) Times a Day. 50 mL 1    dicyclomine (BENTYL) 10 MG capsule Take 1 capsule by mouth Every 6 (Six) Hours As Needed for Abdominal Cramping. 60 capsule 3    ergocalciferol (ERGOCALCIFEROL) 1.25 MG (89533 UT) capsule Take 1 capsule by mouth 2 (Two) Times a Week. 12  capsule 5    Lancets (OneTouch Delica Plus [id number]) misc 1 each by Other route Daily. 100 each 5    meloxicam (MOBIC) 15 MG tablet Take 1 tablet by mouth Daily. 90 tablet 1    metoprolol succinate XL (TOPROL-XL) 50 MG 24 hr tablet Take 1 tablet by mouth Daily. 90 tablet 1    nystatin (MYCOSTATIN) 136142 UNIT/GM powder Apply  topically to the appropriate area as directed 3 (Three) Times a Day. (Patient taking differently: Apply  topically to the appropriate area as directed As Needed.) 1 each 2    nystatin-zinc oxide ystatin 201072 UNIT/GM cream-20g  zinc oxide 40 % paste- 40 g 60 g 0    O2 (OXYGEN) Inhale 2 L/min 1 (One) Time. AS NEEDED, MOSTLY THROUGH NIGHT      ondansetron ODT (ZOFRAN-ODT) 4 MG disintegrating tablet Place 1 tablet on the tongue Every 8 (Eight) Hours As Needed for Nausea or Vomiting. 20 tablet 1    OneTouch Verio test strip Check FBS daily and prn for Diabetes 100 each 5    Ozempic, 2 MG/DOSE, 8 MG/3ML solution pen-injector INJECT 2 MG SUBCUTANEOUSLY  ONCE A WEEK 3 mL 0    Spiriva Respimat 1.25 MCG/ACT aerosol solution inhaler Inhale 2 puffs Daily. 4 g 2    [DISCONTINUED] ondansetron ODT (ZOFRAN-ODT) 4 MG disintegrating tablet Place 1 tablet on the tongue Every 8 (Eight) Hours As Needed for Nausea or Vomiting. 20 tablet 0       Allergies   Allergen Reactions    Metformin Other (See Comments) and Myalgia     When taking metformin she feels run down, fatigued, and body aches.  Off it she improves       Social History     Socioeconomic History    Marital status:    Tobacco Use    Smoking status: Every Day     Current packs/day: 1.00     Average packs/day: 1 pack/day for 35.0 years (35.0 ttl pk-yrs)     Types: Cigarettes     Passive exposure: Past    Smokeless tobacco: Never   Vaping Use    Vaping status: Never Used   Substance and Sexual Activity    Alcohol use: Never    Drug use: Never    Sexual activity: Not Currently     Partners: Male     Birth control/protection: None     Comment: I do  "not use any form of birth control       Family History   Problem Relation Age of Onset    Heart failure Mother     Diabetes Mother         2000    Lung cancer Mother     Heart disease Mother     Cancer Mother         Lung    COPD Mother     Hypertension Mother     Heart attack Father     Heart disease Father     Diabetes Father         2010    Colon cancer Father     Cancer Father         Colon    Hyperlipidemia Father     Stroke Father     Hypertension Father     Alcohol abuse Sister     Colon cancer Brother     Cancer Brother         Colon    Alcohol abuse Brother     Drug abuse Brother         1998    Cancer Paternal Grandfather         Lung Cancer       Review of Systems   Constitutional:  Negative for chills and fever.   Respiratory:  Negative for shortness of breath.    Cardiovascular:  Negative for chest pain.   Gastrointestinal:  Negative for abdominal distention, anal bleeding, blood in stool, constipation, diarrhea and vomiting.        Vitals:    07/03/24 1032   BP: 118/70   Pulse: 77   Temp: 97.5 °F (36.4 °C)   SpO2: 95%   Weight: 98 kg (216 lb)   Height: 157.5 cm (62\")      Body mass index is 39.51 kg/m².    Physical Exam  Vitals reviewed.   Constitutional:       General: She is not in acute distress.  Cardiovascular:      Rate and Rhythm: Normal rate and regular rhythm.      Heart sounds: Normal heart sounds.   Pulmonary:      Effort: Pulmonary effort is normal.      Breath sounds: Normal breath sounds.   Abdominal:      General: Bowel sounds are normal. There is no distension.      Palpations: Abdomen is soft.      Tenderness: There is no abdominal tenderness.   Skin:     General: Skin is warm and dry.   Neurological:      Mental Status: She is alert.         Results for orders placed or performed in visit on 01/29/24   CBC (No Diff)    Specimen: Blood   Result Value Ref Range    WBC 11.47 (H) 3.40 - 10.80 10*3/mm3    RBC 5.08 3.77 - 5.28 10*6/mm3    Hemoglobin 15.7 12.0 - 15.9 g/dL    Hematocrit 46.0 " 34.0 - 46.6 %    MCV 90.6 79.0 - 97.0 fL    MCH 30.9 26.6 - 33.0 pg    MCHC 34.1 31.5 - 35.7 g/dL    RDW 12.6 12.3 - 15.4 %    Platelets 265 140 - 450 10*3/mm3   Comprehensive metabolic panel    Specimen: Blood   Result Value Ref Range    Glucose 85 65 - 99 mg/dL    BUN 9 6 - 20 mg/dL    Creatinine 0.63 0.57 - 1.00 mg/dL    EGFR Result 106.9 >60.0 mL/min/1.73    BUN/Creatinine Ratio 14.3 7.0 - 25.0    Sodium 141 136 - 145 mmol/L    Potassium 3.2 (L) 3.5 - 5.2 mmol/L    Chloride 101 98 - 107 mmol/L    Total CO2 28.4 22.0 - 29.0 mmol/L    Calcium 9.5 8.6 - 10.5 mg/dL    Total Protein 6.2 6.0 - 8.5 g/dL    Albumin 4.1 3.5 - 5.2 g/dL    Globulin 2.1 gm/dL    A/G Ratio 2.0 g/dL    Total Bilirubin 0.4 0.0 - 1.2 mg/dL    Alkaline Phosphatase 98 39 - 117 U/L    AST (SGOT) 27 1 - 32 U/L    ALT (SGPT) 27 1 - 33 U/L   Lipid panel    Specimen: Blood   Result Value Ref Range    Total Cholesterol 103 0 - 200 mg/dL    Triglycerides 207 (H) 0 - 150 mg/dL    HDL Cholesterol 34 (L) 40 - 60 mg/dL    VLDL Cholesterol Morgan 33 5 - 40 mg/dL    LDL Chol Calc (NIH) 36 0 - 100 mg/dL   Hemoglobin A1c    Specimen: Blood   Result Value Ref Range    Hemoglobin A1C 5.70 (H) 4.80 - 5.60 %   Vitamin D 25 hydroxy    Specimen: Blood   Result Value Ref Range    25 Hydroxy, Vitamin D 67.7 30.0 - 100.0 ng/ml           ASSESSMENT AND PLAN    Assessment & Plan     Diagnoses and all orders for this visit:    1. Upper abdominal pain (Primary)  Comments:  chronic    2. Nausea  -     ondansetron ODT (ZOFRAN-ODT) 4 MG disintegrating tablet; Place 1 tablet on the tongue Every 8 (Eight) Hours As Needed for Nausea or Vomiting.  Dispense: 20 tablet; Refill: 1      Abdominal pain has improved on bentyl. I would recommend that she take bentyl 30 minutes prior to lunch and evening meal and see if that makes a difference. I ordered a ct abdomen/pelvis at last office visit however her insurance declined covering the ct. I told her that I could re order and see they will  cover now but she declines since doing better.. I recommend continue healthy diet, low fat diet.     Office visit 2 mo.       * Surgery not found *           Return in about 2 months (around 9/3/2024).          There are no Patient Instructions on file for this visit.      Lola Wynne, APRN

## 2024-07-12 ENCOUNTER — TELEPHONE (OUTPATIENT)
Age: 54
End: 2024-07-12
Payer: COMMERCIAL

## 2024-07-14 DIAGNOSIS — E11.65 CONTROLLED TYPE 2 DIABETES MELLITUS WITH HYPERGLYCEMIA, WITHOUT LONG-TERM CURRENT USE OF INSULIN: ICD-10-CM

## 2024-07-15 ENCOUNTER — TELEPHONE (OUTPATIENT)
Age: 54
End: 2024-07-15
Payer: COMMERCIAL

## 2024-07-17 RX ORDER — SEMAGLUTIDE 2.68 MG/ML
INJECTION, SOLUTION SUBCUTANEOUS
Qty: 3 ML | Refills: 0 | Status: SHIPPED | OUTPATIENT
Start: 2024-07-17

## 2024-07-25 ENCOUNTER — PATIENT MESSAGE (OUTPATIENT)
Dept: FAMILY MEDICINE CLINIC | Facility: CLINIC | Age: 54
End: 2024-07-25
Payer: COMMERCIAL

## 2024-07-29 NOTE — TELEPHONE ENCOUNTER
From: Roopa LOPEZ  To: Anastasiia Rubio  Sent: 7/25/2024 9:25 AM CDT  Subject: Labs    This is a reminder that you have labs due. Lab is here in our office M-F 8:00-4:30 (closed for lunch between 12:00-1:00). No appointment needed.

## 2024-07-30 ENCOUNTER — OFFICE VISIT (OUTPATIENT)
Dept: FAMILY MEDICINE CLINIC | Facility: CLINIC | Age: 54
End: 2024-07-30
Payer: COMMERCIAL

## 2024-07-30 VITALS
WEIGHT: 216 LBS | OXYGEN SATURATION: 99 % | TEMPERATURE: 98.6 F | RESPIRATION RATE: 18 BRPM | HEIGHT: 63 IN | HEART RATE: 77 BPM | SYSTOLIC BLOOD PRESSURE: 117 MMHG | DIASTOLIC BLOOD PRESSURE: 78 MMHG | BODY MASS INDEX: 38.27 KG/M2

## 2024-07-30 DIAGNOSIS — I10 PRIMARY HYPERTENSION: ICD-10-CM

## 2024-07-30 DIAGNOSIS — E78.2 MIXED HYPERLIPIDEMIA: ICD-10-CM

## 2024-07-30 DIAGNOSIS — Z71.6 TOBACCO ABUSE COUNSELING: ICD-10-CM

## 2024-07-30 DIAGNOSIS — Z72.0 TOBACCO USE: ICD-10-CM

## 2024-07-30 DIAGNOSIS — E55.9 VITAMIN D DEFICIENCY: ICD-10-CM

## 2024-07-30 DIAGNOSIS — E11.65 CONTROLLED TYPE 2 DIABETES MELLITUS WITH HYPERGLYCEMIA, WITHOUT LONG-TERM CURRENT USE OF INSULIN: ICD-10-CM

## 2024-07-30 PROCEDURE — 3078F DIAST BP <80 MM HG: CPT | Performed by: NURSE PRACTITIONER

## 2024-07-30 PROCEDURE — 1159F MED LIST DOCD IN RCRD: CPT | Performed by: NURSE PRACTITIONER

## 2024-07-30 PROCEDURE — 3074F SYST BP LT 130 MM HG: CPT | Performed by: NURSE PRACTITIONER

## 2024-07-30 PROCEDURE — 99214 OFFICE O/P EST MOD 30 MIN: CPT | Performed by: NURSE PRACTITIONER

## 2024-07-30 PROCEDURE — 3044F HG A1C LEVEL LT 7.0%: CPT | Performed by: NURSE PRACTITIONER

## 2024-07-30 PROCEDURE — 1125F AMNT PAIN NOTED PAIN PRSNT: CPT | Performed by: NURSE PRACTITIONER

## 2024-07-30 PROCEDURE — 1160F RVW MEDS BY RX/DR IN RCRD: CPT | Performed by: NURSE PRACTITIONER

## 2024-07-30 RX ORDER — CHLORTHALIDONE 25 MG/1
25 TABLET ORAL DAILY
Qty: 90 TABLET | Refills: 0 | Status: SHIPPED | OUTPATIENT
Start: 2024-07-30

## 2024-07-30 RX ORDER — BUPROPION HYDROCHLORIDE 150 MG/1
150 TABLET, EXTENDED RELEASE ORAL 2 TIMES DAILY
Qty: 180 TABLET | Refills: 1 | Status: SHIPPED | OUTPATIENT
Start: 2024-07-30 | End: 2025-01-26

## 2024-07-30 RX ORDER — SEMAGLUTIDE 2.68 MG/ML
2 INJECTION, SOLUTION SUBCUTANEOUS WEEKLY
Qty: 6 ML | Refills: 2 | Status: SHIPPED | OUTPATIENT
Start: 2024-07-30

## 2024-07-30 RX ORDER — ATORVASTATIN CALCIUM 40 MG/1
40 TABLET, FILM COATED ORAL NIGHTLY
Qty: 90 TABLET | Refills: 0 | Status: SHIPPED | OUTPATIENT
Start: 2024-07-30

## 2024-07-30 RX ORDER — ERGOCALCIFEROL 1.25 MG/1
50000 CAPSULE ORAL 2 TIMES WEEKLY
Qty: 12 CAPSULE | Refills: 5 | Status: SHIPPED | OUTPATIENT
Start: 2024-08-01

## 2024-07-30 RX ORDER — METOPROLOL SUCCINATE 50 MG/1
50 TABLET, EXTENDED RELEASE ORAL DAILY
Qty: 90 TABLET | Refills: 1 | Status: SHIPPED | OUTPATIENT
Start: 2024-07-30

## 2024-07-30 NOTE — PROGRESS NOTES
Chief Complaint  Hip Pain    Subjective        Anastasiia Rubio presents to [facility]  History of Present Illness  History of Present Illness  The patient is a 54-year-old female here for follow-up on hip pain.    The patient reports experiencing pain in her right hip, which she rates as 10 on a scale of 10, upon waking in the morning. The pain subsides after approximately 10 minutes of activity, but recurs upon standing after prolonged periods of sitting. She has been managing the pain with over-the-counter Aleve Arthritis, which initially provided relief, but the pain has recently escalated to the point where she has resorted to taking two Aleve Arthritis tablets. She denies any specific injury to the hip, but suspects she may have arthritis. The pain is not sharp, dull, achy, or stabbing, and is exacerbated by walking. She denies any sensations of popping or catching in the hip. She maintains an active lifestyle, walking frequently, and plans to commence gym visits. She denies any tingling or muscle weakness in the lower leg, issues with standing for extended periods, or loss of sensation. She denies any foreign bodies aggravated by movement, palpation, or standing treatments.    The patient expresses a desire to discontinue some of her medications. She experienced dizziness throughout the day yesterday, particularly upon standing, and felt like she was going to faint. These symptoms resolved after a few minutes of standing, but she continues to feel unwell and drained. She denies any shortness of breath, palpitations, or blurred vision.      The following portions of the patient's history were reviewed and updated as appropriate: allergies, current medications, past family history, past medical history, past social history, past surgical history and problem list.    Objective   Vital Signs:  /78 (BP Location: Left arm, Patient Position: Standing, Cuff Size: Large Adult)    "Pulse 77   Temp 98.6 °F (37 °C) (Infrared)   Resp 18   Ht 160 cm (63\") Comment: per patient  Wt 98 kg (216 lb)   SpO2 99%   BMI 38.26 kg/m²   Estimated body mass index is 38.26 kg/m² as calculated from the following:    Height as of this encounter: 160 cm (63\").    Weight as of this encounter: 98 kg (216 lb).       Class 2 Severe Obesity (BMI >=35 and <=39.9). Obesity-related health conditions include the following: hypertension and hypertension, vit d def, type 2 diabetes, depression and muscle spasms . Obesity is improving with treatment. BMI is is above average; BMI management plan is completed. We discussed portion control and increasing exercise.        Physical Exam  Vitals and nursing note reviewed.   Constitutional:       General: She is awake.      Appearance: Normal appearance. She is well-developed and well-groomed.   HENT:      Head: Normocephalic and atraumatic.      Right Ear: Hearing, tympanic membrane, ear canal and external ear normal.      Left Ear: Hearing, tympanic membrane, ear canal and external ear normal.      Nose: Nose normal.      Mouth/Throat:      Lips: Pink.      Pharynx: Oropharynx is clear.   Eyes:      General: Lids are normal.      Conjunctiva/sclera: Conjunctivae normal.   Cardiovascular:      Rate and Rhythm: Normal rate and regular rhythm.      Heart sounds: Normal heart sounds.   Pulmonary:      Effort: Pulmonary effort is normal.      Breath sounds: Normal breath sounds and air entry.   Musculoskeletal:      Cervical back: Full passive range of motion without pain.      Right hip: Tenderness present. Decreased range of motion.      Left hip: Normal.      Right lower leg: No swelling. No edema.      Left lower leg: No swelling. No edema.        Legs:    Lymphadenopathy:      Head:      Right side of head: No submental, submandibular or tonsillar adenopathy.      Left side of head: No submental, submandibular or tonsillar adenopathy.   Skin:     General: Skin is warm and " dry.   Neurological:      Mental Status: She is alert.      Sensory: Sensation is intact.      Motor: Motor function is intact.      Coordination: Coordination is intact.      Gait: Gait is intact.   Psychiatric:         Attention and Perception: Attention and perception normal.         Mood and Affect: Mood and affect normal.         Speech: Speech normal.         Behavior: Behavior normal. Behavior is cooperative.         Thought Content: Thought content normal.         Cognition and Memory: Cognition and memory normal.         Judgment: Judgment normal.        Physical Exam  Vital Signs  BMI has decreased from 39.5 to 38.3.    Result Review :          Results             Assessment and Plan     Diagnoses and all orders for this visit:    1. Primary hypertension  -     metoprolol succinate XL (TOPROL-XL) 50 MG 24 hr tablet; Take 1 tablet by mouth Daily.  Dispense: 90 tablet; Refill: 1  -     chlorthalidone (HYGROTON) 25 MG tablet; Take 1 tablet by mouth Daily.  Dispense: 90 tablet; Refill: 0    2. Vitamin D deficiency  -     ergocalciferol (ERGOCALCIFEROL) 1.25 MG (29010 UT) capsule; Take 1 capsule by mouth 2 (Two) Times a Week.  Dispense: 12 capsule; Refill: 5    3. Mixed hyperlipidemia  -     atorvastatin (LIPITOR) 40 MG tablet; Take 1 tablet by mouth Every Night.  Dispense: 90 tablet; Refill: 0    4. Tobacco use  -     buPROPion SR (Wellbutrin SR) 150 MG 12 hr tablet; Take 1 tablet by mouth 2 (Two) Times a Day for 180 days. Take 150mg daily for 3 days, then 150mg 2 times daily  Dispense: 180 tablet; Refill: 1    5. Tobacco abuse counseling  -     buPROPion SR (Wellbutrin SR) 150 MG 12 hr tablet; Take 1 tablet by mouth 2 (Two) Times a Day for 180 days. Take 150mg daily for 3 days, then 150mg 2 times daily  Dispense: 180 tablet; Refill: 1    6. Controlled type 2 diabetes mellitus with hyperglycemia, without long-term current use of insulin  -     Semaglutide, 2 MG/DOSE, (Ozempic, 2 MG/DOSE,) 8 MG/3ML solution  pen-injector; Inject 2 mg under the skin into the appropriate area as directed 1 (One) Time Per Week.  Dispense: 6 mL; Refill: 2      Assessment & Plan  1. Chronic hypertension.  The patient has been experiencing this condition for over a year, which has shown improvement and is well-managed. The patient is currently on a regimen of metoprolol for this condition. She has expressed a desire to reduce her metoprolol dosage to observe if this alleviates her symptoms. Consequently, this approach will be implemented.    2. Dizziness.  The etiology of the dizziness remains undetermined.    Follow-up  A follow-up appointment is scheduled for 1 month from now, with the provision for an earlier visit if necessary.      ICD-10-CM ICD-9-CM   1. Primary hypertension  I10 401.9   2. Vitamin D deficiency  E55.9 268.9   3. Mixed hyperlipidemia  E78.2 272.2   4. Tobacco use  Z72.0 305.1   5. Tobacco abuse counseling  Z71.6 V65.42     305.1   6. Controlled type 2 diabetes mellitus with hyperglycemia, without long-term current use of insulin  E11.65 250.80     790.29                Follow Up     Return in about 3 months (around 10/30/2024) for Recheck.  Patient was given instructions and counseling regarding her condition or for health maintenance advice. Please see specific information pulled into the AVS if appropriate.       Patient or patient representative verbalized consent for the use of Ambient Listening during the visit with  Nani Adkins DNP, AMY for chart documentation. 7/30/2024  15:45 CDT    Electronically signed by Nani Adkins DNP, AMY, 07/30/24, 3:45 PM CDT.

## 2024-08-08 ENCOUNTER — TELEPHONE (OUTPATIENT)
Age: 54
End: 2024-08-08
Payer: COMMERCIAL

## 2024-08-10 DIAGNOSIS — E78.2 MIXED HYPERLIPIDEMIA: ICD-10-CM

## 2024-08-13 RX ORDER — ATORVASTATIN CALCIUM 40 MG/1
40 TABLET, FILM COATED ORAL NIGHTLY
Qty: 90 TABLET | Refills: 0 | OUTPATIENT
Start: 2024-08-13

## 2024-08-16 DIAGNOSIS — B37.2 YEAST INFECTION OF THE SKIN: ICD-10-CM

## 2024-08-16 NOTE — TELEPHONE ENCOUNTER
Fax received from pharmacy for med refill       Rx Refill Note  Requested Prescriptions     Pending Prescriptions Disp Refills    nystatin-zinc oxide 60 g 0     Sig: Apply  topically to the appropriate area as directed As Needed (yeast infection).      Last office visit with prescribing clinician: 7/30/2024     Next office visit with prescribing clinician: 9/20/2024     LRX:3/2024      Radha Fajardo MA  08/16/24, 14:24 CDT

## 2024-09-01 DIAGNOSIS — I10 PRIMARY HYPERTENSION: ICD-10-CM

## 2024-09-01 DIAGNOSIS — R11.0 NAUSEA: ICD-10-CM

## 2024-09-01 DIAGNOSIS — E78.2 MIXED HYPERLIPIDEMIA: ICD-10-CM

## 2024-09-03 RX ORDER — ONDANSETRON 4 MG/1
4 TABLET, ORALLY DISINTEGRATING ORAL EVERY 8 HOURS PRN
Qty: 20 TABLET | Refills: 2 | OUTPATIENT
Start: 2024-09-03

## 2024-09-03 RX ORDER — CHLORTHALIDONE 25 MG/1
25 TABLET ORAL DAILY
Qty: 90 TABLET | Refills: 0 | Status: SHIPPED | OUTPATIENT
Start: 2024-09-03

## 2024-09-03 RX ORDER — ATORVASTATIN CALCIUM 40 MG/1
40 TABLET, FILM COATED ORAL NIGHTLY
Qty: 90 TABLET | Refills: 0 | Status: SHIPPED | OUTPATIENT
Start: 2024-09-03

## 2024-09-03 NOTE — TELEPHONE ENCOUNTER
Rx Refill Note  Requested Prescriptions     Pending Prescriptions Disp Refills    atorvastatin (LIPITOR) 40 MG tablet [Pharmacy Med Name: Atorvastatin Calcium 40 MG Oral Tablet] 90 tablet 0     Sig: TAKE 1 TABLET BY MOUTH ONCE DAILY AT NIGHT    chlorthalidone (HYGROTON) 25 MG tablet [Pharmacy Med Name: Chlorthalidone 25 MG Oral Tablet] 90 tablet 0     Sig: Take 1 tablet by mouth once daily      Last office visit with prescribing clinician: 7/30/2024   Last telemedicine visit with prescribing clinician: Visit date not found   Next office visit with prescribing clinician: 9/20/2024                         Would you like a call back once the refill request has been completed: [] Yes [] No    If the office needs to give you a call back, can they leave a voicemail: [] Yes [] No    Chelsea Lee MA  09/03/24, 09:22 CDT

## 2024-09-04 DIAGNOSIS — R11.0 NAUSEA: ICD-10-CM

## 2024-09-05 RX ORDER — ONDANSETRON 4 MG/1
4 TABLET, ORALLY DISINTEGRATING ORAL EVERY 8 HOURS PRN
Qty: 20 TABLET | Refills: 0 | OUTPATIENT
Start: 2024-09-05

## 2024-10-01 ENCOUNTER — OFFICE VISIT (OUTPATIENT)
Dept: FAMILY MEDICINE CLINIC | Facility: CLINIC | Age: 54
End: 2024-10-01
Payer: COMMERCIAL

## 2024-10-01 VITALS
TEMPERATURE: 98.2 F | SYSTOLIC BLOOD PRESSURE: 108 MMHG | WEIGHT: 221 LBS | RESPIRATION RATE: 18 BRPM | HEART RATE: 76 BPM | DIASTOLIC BLOOD PRESSURE: 62 MMHG | BODY MASS INDEX: 39.16 KG/M2 | OXYGEN SATURATION: 99 % | HEIGHT: 63 IN

## 2024-10-01 DIAGNOSIS — E11.40 TYPE 2 DIABETES, CONTROLLED, WITH NEUROPATHY: ICD-10-CM

## 2024-10-01 DIAGNOSIS — Z23 IMMUNIZATION DUE: ICD-10-CM

## 2024-10-01 DIAGNOSIS — Z00.01 ENCOUNTER FOR WELL ADULT EXAM WITH ABNORMAL FINDINGS: Primary | ICD-10-CM

## 2024-10-01 DIAGNOSIS — G47.33 OBSTRUCTIVE SLEEP APNEA SYNDROME: ICD-10-CM

## 2024-10-01 DIAGNOSIS — F17.200 TOBACCO DEPENDENCE: ICD-10-CM

## 2024-10-01 DIAGNOSIS — E03.8 OTHER SPECIFIED HYPOTHYROIDISM: ICD-10-CM

## 2024-10-01 DIAGNOSIS — Z12.31 ENCOUNTER FOR SCREENING MAMMOGRAM FOR MALIGNANT NEOPLASM OF BREAST: ICD-10-CM

## 2024-10-01 DIAGNOSIS — K21.9 GASTROESOPHAGEAL REFLUX DISEASE WITHOUT ESOPHAGITIS: ICD-10-CM

## 2024-10-01 DIAGNOSIS — M54.32 SCIATICA OF LEFT SIDE: ICD-10-CM

## 2024-10-01 DIAGNOSIS — I10 PRIMARY HYPERTENSION: ICD-10-CM

## 2024-10-01 DIAGNOSIS — R11.0 NAUSEA: ICD-10-CM

## 2024-10-01 DIAGNOSIS — E78.2 MIXED HYPERLIPIDEMIA: ICD-10-CM

## 2024-10-01 DIAGNOSIS — Z99.81 SUPPLEMENTAL OXYGEN DEPENDENT: ICD-10-CM

## 2024-10-01 DIAGNOSIS — M25.552 LEFT HIP PAIN: ICD-10-CM

## 2024-10-01 RX ORDER — ONDANSETRON 4 MG/1
4 TABLET, ORALLY DISINTEGRATING ORAL EVERY 8 HOURS PRN
Qty: 20 TABLET | Refills: 1 | Status: SHIPPED | OUTPATIENT
Start: 2024-10-01

## 2024-10-01 RX ORDER — DICLOFENAC SODIUM 75 MG/1
75 TABLET, DELAYED RELEASE ORAL 2 TIMES DAILY
Qty: 180 TABLET | Refills: 1 | Status: SHIPPED | OUTPATIENT
Start: 2024-10-01

## 2024-10-01 NOTE — ASSESSMENT & PLAN NOTE
Tobacco use is unchanged.  Smoking cessation counseling was provided.  Tobacco use will be reassessed at the next regular appointment.

## 2024-10-01 NOTE — PROGRESS NOTES
Subjective   Annual well     Anastasiia Rubio is a 54 y.o. patient who presents for an annual well visit     Chronic problems include: HTN, hyperlipidemia,depression, IBS, tachycardia, vit d def, shortness of breath with supplemental oxygen prn, COPD, asthma, Tobacco abuse, GERD, hypothyroidism,      The following portions of the patient's history were reviewed and updated as appropriate: allergies, current medications, past family history, past medical history, past social history, past surgical history, and problem list.    Compared to one year ago, the patient's physical health is better.  Compared to one year ago, the patient's mental health is better.    Recent Hospitalizations:  She was not admitted to the hospital during the last year.     Current Medical Providers:  Patient Care Team:  Nani Adkins, DNP, APRN as PCP - General (Family Medicine)  Christopher Freeman MD as Consulting Physician (Gastroenterology)    Outpatient Medications Prior to Visit   Medication Sig Dispense Refill    albuterol (PROVENTIL) (2.5 MG/3ML) 0.083% nebulizer solution USE 1 VIAL IN NEBULIZER EVERY 4 HOURS AS NEEDED      albuterol sulfate HFA (ProAir HFA) 108 (90 Base) MCG/ACT inhaler Inhale 2 puffs Every 4 (Four) Hours As Needed for Wheezing. 8 g 11    atorvastatin (LIPITOR) 40 MG tablet TAKE 1 TABLET BY MOUTH ONCE DAILY AT NIGHT 90 tablet 0    azelastine (ASTEPRO) 0.15 % solution nasal spray 2 sprays into the nostril(s) as directed by provider 2 (Two) Times a Day. 30 mL 2    Blood Glucose Monitoring Suppl (OneTouch Verio Flex System) w/Device kit Test blood glucose level 1-2 times per day DX:E11.9      budesonide-formoterol (SYMBICORT) 80-4.5 MCG/ACT inhaler Inhale 2 puffs Every 6 (Six) Hours. 10.2 g 5    buPROPion SR (Wellbutrin SR) 150 MG 12 hr tablet Take 1 tablet by mouth 2 (Two) Times a Day for 180 days. Take 150mg daily for 3 days, then 150mg 2 times daily 180 tablet 1    chlorthalidone (HYGROTON) 25 MG tablet Take 1 tablet  by mouth once daily 90 tablet 0    clobetasol (TEMOVATE) 0.05 % external solution Apply  topically to the appropriate area as directed 2 (Two) Times a Day. 50 mL 1    dicyclomine (BENTYL) 10 MG capsule Take 1 capsule by mouth Every 6 (Six) Hours As Needed for Abdominal Cramping. 60 capsule 3    ergocalciferol (ERGOCALCIFEROL) 1.25 MG (31233 UT) capsule Take 1 capsule by mouth 2 (Two) Times a Week. 12 capsule 5    Lancets (OneTouch Delica Plus [id number]) misc 1 each by Other route Daily. 100 each 5    meloxicam (MOBIC) 15 MG tablet Take 1 tablet by mouth Daily. 90 tablet 1    metoprolol succinate XL (TOPROL-XL) 50 MG 24 hr tablet Take 1 tablet by mouth Daily. 90 tablet 1    nystatin-zinc oxide Apply  topically to the appropriate area as directed As Needed (yeast infection). 60 g 0    O2 (OXYGEN) Inhale 2 L/min 1 (One) Time. AS NEEDED, MOSTLY THROUGH NIGHT      ondansetron ODT (ZOFRAN-ODT) 4 MG disintegrating tablet Place 1 tablet on the tongue Every 8 (Eight) Hours As Needed for Nausea or Vomiting. 20 tablet 1    OneTouch Verio test strip Check FBS daily and prn for Diabetes 100 each 5    Semaglutide, 2 MG/DOSE, (Ozempic, 2 MG/DOSE,) 8 MG/3ML solution pen-injector Inject 2 mg under the skin into the appropriate area as directed 1 (One) Time Per Week. 6 mL 2    Spiriva Respimat 1.25 MCG/ACT aerosol solution inhaler Inhale 2 puffs Daily. 4 g 2     No facility-administered medications prior to visit.     No opioid medication identified on active medication list. I have reviewed chart for other potential  high risk medication/s and harmful drug interactions in the elderly.      Aspirin is not on active medication list.  Aspirin use is not indicated based on review of current medical condition/s. Risk of harm outweighs potential benefits.  .    Patient Active Problem List   Diagnosis    Amenorrhea    Actinic keratitis    Asthma    COPD (chronic obstructive pulmonary disease)    Depression    Diabetes mellitus, type 2     "Elevated hemoglobin    Erythrocytosis    ET (eustachian tube disorder)    Hearing loss    GERD (gastroesophageal reflux disease)    History of 2019 novel coronavirus disease (COVID-19)    Hypertension    Hypothyroidism    Leg edema    Leukocytosis    Low HDL (under 40)    Seborrheic keratosis    Stress incontinence, female    Tobacco abuse    Tobacco dependence    Transaminitis    Inappropriate sinus tachycardia    Supplemental oxygen dependent    Sleep apnea    Hyperlipidemia    RABAGO (dyspnea on exertion)    Wheezing    Reactive airway disease    Sleep related hypoxia    Cigarette nicotine dependence without complication     Advance Care Planning Advance Directive is not on file.  ACP discussion was held with the patient during this visit. Patient does not have an advance directive, information provided.      Objective   Vitals:    10/01/24 1416   BP: 108/62   BP Location: Right arm   Patient Position: Sitting   Cuff Size: Adult   Pulse: 76   Resp: 18   Temp: 98.2 °F (36.8 °C)   TempSrc: Infrared   SpO2: 99%   Weight: 100 kg (221 lb)   Height: 160 cm (63\")  Comment: per patient   PainSc: 0-No pain     Estimated body mass index is 39.15 kg/m² as calculated from the following:    Height as of this encounter: 160 cm (63\").    Weight as of this encounter: 100 kg (221 lb).     Does the patient have evidence of cognitive impairment? No                                                                                      Health  Risk Assessment    Smoking Status:  Social History     Tobacco Use   Smoking Status Every Day    Current packs/day: 1.00    Average packs/day: 1 pack/day for 35.0 years (35.0 ttl pk-yrs)    Types: Cigarettes    Passive exposure: Past   Smokeless Tobacco Never     Alcohol Consumption:  Social History     Substance and Sexual Activity   Alcohol Use Never       Fall Risk Screen  STEADI Fall Risk Assessment has not been completed.    Depression Screenin/29/2024     2:14 PM   PHQ-2/PHQ-9 " Depression Screening   Little Interest or Pleasure in Doing Things 0-->not at all   Feeling Down, Depressed or Hopeless 0-->not at all   PHQ-9: Brief Depression Severity Measure Score 0     Health Habits and Functional and Cognitive Screenin/8/2023     2:00 PM   Functional & Cognitive Status   Do you have difficulty preparing food and eating? No   Do you have difficulty bathing yourself, getting dressed or grooming yourself? No   Do you have difficulty using the toilet? No   Do you have difficulty moving around from place to place? No   Do you have trouble with steps or getting out of a bed or a chair? No   Current Diet Well Balanced Diet   Dental Exam Up to date   Eye Exam Up to date   Exercise (times per week) 4 times per week   Do you need help using the phone?  No   Are you deaf or do you have serious difficulty hearing?  No   Do you need help to go to places out of walking distance? No   Do you need help shopping? No   Do you need help preparing meals?  No   Do you need help with housework?  No   Do you need help with laundry? No   Do you need help taking your medications? No   Do you need help managing money? No   Do you ever drive or ride in a car without wearing a seat belt? No   Have you felt unusual stress, anger or loneliness in the last month? No   Who do you live with? Spouse   If you need help, do you have trouble finding someone available to you? No   Have you been bothered in the last four weeks by sexual problems? No   Do you have difficulty concentrating, remembering or making decisions? No           Age-appropriate Screening Schedule:  Refer to the list below for future screening recommendations based on patient's age, sex and/or medical conditions. Orders for these recommended tests are listed in the plan section. The patient has been provided with a written plan.    Health Maintenance List  Health Maintenance   Topic Date Due    DIABETIC EYE EXAM  Never done    Hepatitis B ( 3 - 19+  3-dose series) Never done    PAP SMEAR  Never done    ZOSTER VACCINE (2 of 2) 07/22/2022    DIABETIC FOOT EXAM  05/03/2024    URINE MICROALBUMIN  05/03/2024    INFLUENZA VACCINE  08/01/2024    MAMMOGRAM  08/05/2024    HEMOGLOBIN A1C  08/29/2024    COVID-19 Vaccine (4 - 2023-24 season) 09/01/2024    ANNUAL PHYSICAL  09/08/2024    LUNG CANCER SCREENING  09/27/2024    LIPID PANEL  02/28/2025    BMI FOLLOWUP  07/30/2025    COLORECTAL CANCER SCREENING  05/30/2026    TDAP/TD VACCINES (2 - Td or Tdap) 05/27/2032    HEPATITIS C SCREENING  Completed    Pneumococcal Vaccine 0-64  Completed     Physical Exam  Vitals and nursing note reviewed.   Constitutional:       General: She is awake.      Appearance: Normal appearance. She is well-developed and well-groomed.   HENT:      Head: Normocephalic and atraumatic.      Right Ear: Hearing, tympanic membrane, ear canal and external ear normal.      Left Ear: Hearing, tympanic membrane, ear canal and external ear normal.      Nose: Nose normal.      Mouth/Throat:      Lips: Pink.      Pharynx: Oropharynx is clear.   Eyes:      General: Lids are normal.      Conjunctiva/sclera: Conjunctivae normal.   Cardiovascular:      Rate and Rhythm: Normal rate and regular rhythm.      Heart sounds: Normal heart sounds.   Pulmonary:      Effort: Pulmonary effort is normal.      Breath sounds: Normal breath sounds and air entry.   Musculoskeletal:      Cervical back: Full passive range of motion without pain.      Right lower leg: No edema.      Left lower leg: No edema.   Lymphadenopathy:      Head:      Right side of head: No submental, submandibular or tonsillar adenopathy.      Left side of head: No submental, submandibular or tonsillar adenopathy.   Skin:     General: Skin is warm and dry.   Neurological:      Mental Status: She is alert.      Sensory: Sensation is intact.      Motor: Motor function is intact.      Coordination: Coordination is intact.      Gait: Gait is intact.    Psychiatric:         Attention and Perception: Attention and perception normal.         Mood and Affect: Mood and affect normal.         Speech: Speech normal.         Behavior: Behavior normal. Behavior is cooperative.         Thought Content: Thought content normal.         Cognition and Memory: Cognition and memory normal.         Judgment: Judgment normal.                                                                                                                                             The above risks/problems have been discussed with the patient.  Pertinent information has been shared with the patient in the After Visit Summary.  An After Visit Summary and PPPS were made available to the patient.    Follow Up:      Diagnoses and all orders for this visit:    1. Encounter for well adult exam with abnormal findings (Primary)    2. Type 2 diabetes, controlled, with neuropathy  -     Microalbumin / Creatinine Urine Ratio - Urine, Clean Catch; Future  -     Hemoglobin A1c; Future  -     CBC (No Diff); Future  -     Comprehensive metabolic panel; Future  -     Continue semaglutide as prescribed    3. Primary hypertension  Assessment & Plan:  Hypertension is controlled  Continue current treatment regimen.  Dietary sodium restriction.  Blood pressure will be reassessed.  Continue metoprolol as perscribed    4. Mixed hyperlipidemia  Assessment & Plan:  Eat baked instead of fried or fast foods  Exercise and lose weight    Continue atorvastatin as prescribed     Orders:  -     Lipid panel; Future    5. Obstructive sleep apnea syndrome    6. Other specified hypothyroidism       Stable does not have any symptoms        Continue hygroton 25 mg as prescribed    7. Gastroesophageal reflux disease without esophagitis       Avoid foods that trigger gerd         8. Supplemental oxygen dependent        Uses it prn    9. Tobacco dependence  Assessment & Plan:  Recommend smoking cessation   Ryland Sandoval is a current  cigarettes user.  sHe currently smokes 1 pack of cigarettes per day for a duration of 35 years. I have educated him on the risk of diseases from using tobacco products such as cancer, COPD, and heart diease.     I advised him to quit and he is not willing to quit.    I spent 3  minutes counseling the patient.    10. Immunization due  -     Fluzone >6mos (3112-1309); Future  -     “Discussed risks/benefits to vaccination, reviewed components of the vaccine, discussed VIS, discussed informed consent, informed consent obtained. Patient/Parent was allowed to accept or refuse vaccine. Questions answered to satisfactory state of patient/Parent. We reviewed typical age appropriate and seasonally appropriate vaccinations. Reviewed immunization history and updated state vaccination form as needed. Patient was counseled on Influenza     11. Encounter for screening mammogram for malignant neoplasm of breast  -     Mammo Screening Digital Tomosynthesis Bilateral With CAD; Future    12. Nausea  -     ondansetron ODT (ZOFRAN-ODT) 4 MG disintegrating tablet; Place 1 tablet on the tongue Every 8 (Eight) Hours As Needed for Nausea or Vomiting.  Dispense: 20 tablet; Refill: 1    13. Left hip pain  14. Sciatica of left side  -     diclofenac (VOLTAREN) 75 MG EC tablet; Take 1 tablet by mouth 2 (Two) Times a Day.  Dispense: 180 tablet; Refill: 1      Assessment & Plan  Encounter for well adult exam with abnormal findings    Type 2 diabetes, controlled, with neuropathy  Diabetes is stable.   Continue current treatment regimen.  Diabetes will be reassessed in 1 month  Primary hypertension  Hypertension is  controlled  Continue current treatment regimen.  Dietary sodium restriction.  Blood pressure will be reassessedat the next regular appointment.  Mixed hyperlipidemia   Lipid abnormalities are stable    Plan:  Continue same medication/s without change.      Discussed medication dosage, use, side effects, and goals of treatment in  detail.    Counseled patient on lifestyle modifications to help control hyperlipidemia.     Patient Treatment Goals:   LDL goal is less than 70    Followup at the next regular appointment.  Obstructive sleep apnea syndrome    Other specified hypothyroidism    Gastroesophageal reflux disease without esophagitis    Supplemental oxygen dependent    Tobacco dependence  Tobacco use is unchanged.  Smoking cessation counseling was provided.  Tobacco use will be reassessed at the next regular appointment.        Immunization due    Encounter for screening mammogram for malignant neoplasm of breast    Nausea    Left hip pain    Sciatica of left side               Follow Up:   Return in about 1 month (around 11/1/2024) for Annual physical.    Electronically signed by Nani Adkins, CHANDU, APRN, 10/01/24, 4:13 PM CDT.

## 2024-10-01 NOTE — ASSESSMENT & PLAN NOTE
Hypertension is controlled  Continue current treatment regimen.  Dietary sodium restriction.  Blood pressure will be reassessedat the next regular appointment.

## 2024-10-07 NOTE — TELEPHONE ENCOUNTER
CALLED PT TO INFORM HER OF THE APPT WITH DR WESTFALL ON 1/7/2022 AT 1045 AM, SHE VOICES UNDERSTANDING AND WILL CALL THEM TO SEE ABOUT BEING PUT ON A CANCELLATION LIST  
I called the provider listed on the referral and was told that  was no longer at .    [facility] scheduled pt with Dr. Borrero on 1/7/2022 at 1045 am.   I will call the patient to inform of this appt.   
Pt states at her last visit she was  Supposed to get scheduled with a gyn urology and was told to call the office if she hasn't heard anything in a week.  
No

## 2024-10-23 ENCOUNTER — PATIENT MESSAGE (OUTPATIENT)
Dept: FAMILY MEDICINE CLINIC | Facility: CLINIC | Age: 54
End: 2024-10-23
Payer: COMMERCIAL

## 2024-10-24 ENCOUNTER — OFFICE VISIT (OUTPATIENT)
Dept: FAMILY MEDICINE CLINIC | Facility: CLINIC | Age: 54
End: 2024-10-24
Payer: COMMERCIAL

## 2024-10-24 ENCOUNTER — TELEPHONE (OUTPATIENT)
Dept: FAMILY MEDICINE CLINIC | Facility: CLINIC | Age: 54
End: 2024-10-24

## 2024-10-24 VITALS
HEIGHT: 63 IN | BODY MASS INDEX: 40.57 KG/M2 | TEMPERATURE: 98.2 F | HEART RATE: 101 BPM | RESPIRATION RATE: 16 BRPM | DIASTOLIC BLOOD PRESSURE: 80 MMHG | OXYGEN SATURATION: 92 % | SYSTOLIC BLOOD PRESSURE: 122 MMHG | WEIGHT: 229 LBS

## 2024-10-24 DIAGNOSIS — Z72.0 TOBACCO ABUSE: ICD-10-CM

## 2024-10-24 DIAGNOSIS — J44.1 COPD WITH EXACERBATION: Primary | ICD-10-CM

## 2024-10-24 DIAGNOSIS — J18.9 COMMUNITY ACQUIRED PNEUMONIA, UNSPECIFIED LATERALITY: ICD-10-CM

## 2024-10-24 RX ORDER — CEFTRIAXONE 1 G/1
1 INJECTION, POWDER, FOR SOLUTION INTRAMUSCULAR; INTRAVENOUS ONCE
Status: COMPLETED | OUTPATIENT
Start: 2024-10-24 | End: 2024-10-24

## 2024-10-24 RX ORDER — CEFDINIR 300 MG/1
300 CAPSULE ORAL 2 TIMES DAILY
Qty: 14 CAPSULE | Refills: 0 | Status: SHIPPED | OUTPATIENT
Start: 2024-10-24

## 2024-10-24 RX ORDER — VARENICLINE TARTRATE 1 MG/1
1 TABLET, FILM COATED ORAL 2 TIMES DAILY
Qty: 56 TABLET | Refills: 1 | Status: SHIPPED | OUTPATIENT
Start: 2024-11-21 | End: 2025-01-16

## 2024-10-24 RX ORDER — PREDNISONE 10 MG/1
TABLET ORAL
Qty: 30 TABLET | Refills: 0 | Status: SHIPPED | OUTPATIENT
Start: 2024-10-24

## 2024-10-24 RX ORDER — ALBUTEROL SULFATE 0.83 MG/ML
2.5 SOLUTION RESPIRATORY (INHALATION) EVERY 4 HOURS PRN
Qty: 90 ML | Refills: 1 | Status: SHIPPED | OUTPATIENT
Start: 2024-10-24

## 2024-10-24 RX ORDER — DEXAMETHASONE SODIUM PHOSPHATE 10 MG/ML
10 INJECTION INTRAMUSCULAR; INTRAVENOUS ONCE
Status: COMPLETED | OUTPATIENT
Start: 2024-10-24 | End: 2024-10-24

## 2024-10-24 RX ORDER — VARENICLINE TARTRATE 0.5 (11)-1
KIT ORAL
Qty: 1 EACH | Refills: 0 | Status: SHIPPED | OUTPATIENT
Start: 2024-10-24 | End: 2024-11-21

## 2024-10-24 RX ADMIN — DEXAMETHASONE SODIUM PHOSPHATE 10 MG: 10 INJECTION INTRAMUSCULAR; INTRAVENOUS at 14:30

## 2024-10-24 RX ADMIN — CEFTRIAXONE 1 G: 1 INJECTION, POWDER, FOR SOLUTION INTRAMUSCULAR; INTRAVENOUS at 14:29

## 2024-10-24 NOTE — PROGRESS NOTES
"Chief Complaint  COPD (Pt is here for copd follow up )    Subjective        Anastasiia Rubio presents to [facility]  History of Present Illness  Mrs. Rubio presents today with a cough.  She has had a cough for ~2 weeks.  She says her cough is worse when she lays down.  She smokes.      Objective   Vital Signs:  /80 (BP Location: Left arm, Patient Position: Sitting, Cuff Size: Adult)   Pulse 101   Temp 98.2 °F (36.8 °C) (Temporal)   Resp 16   Ht 160 cm (62.99\")   Wt 104 kg (229 lb)   SpO2 92%   BMI 40.58 kg/m²   Estimated body mass index is 40.58 kg/m² as calculated from the following:    Height as of this encounter: 160 cm (62.99\").    Weight as of this encounter: 104 kg (229 lb).            Physical Exam  Vitals reviewed.   Constitutional:       General: She is not in acute distress.     Appearance: Normal appearance. She is normal weight. She is not ill-appearing, toxic-appearing or diaphoretic.   HENT:      Head: Normocephalic and atraumatic.      Right Ear: External ear normal.      Left Ear: External ear normal.      Nose: Nose normal.   Eyes:      Extraocular Movements: Extraocular movements intact.   Cardiovascular:      Rate and Rhythm: Normal rate and regular rhythm.   Pulmonary:      Effort: Pulmonary effort is normal. No respiratory distress.      Breath sounds: Normal breath sounds.   Skin:     General: Skin is warm and dry.      Coloration: Skin is not jaundiced or pale.   Neurological:      Mental Status: She is alert and oriented to person, place, and time.   Psychiatric:         Mood and Affect: Mood normal.         Behavior: Behavior normal.         Thought Content: Thought content normal.         Judgment: Judgment normal.            Result Review :                Assessment and Plan   Diagnoses and all orders for this visit:    1. COPD with exacerbation (Primary)  -     XR Chest PA & Lateral  -     albuterol (PROVENTIL) (2.5 MG/3ML) 0.083% nebulizer " solution; Take 2.5 mg by nebulization Every 4 (Four) Hours As Needed for Wheezing.  Dispense: 90 mL; Refill: 1  -     Home Nebulizer  -     dexAMETHasone (DECADRON) injection 10 mg  -     cefTRIAXone (ROCEPHIN) injection 1 g  -     cefdinir (OMNICEF) 300 MG capsule; Take 1 capsule by mouth 2 (Two) Times a Day.  Dispense: 14 capsule; Refill: 0  -     predniSONE (DELTASONE) 10 MG tablet; 4 tabs PO daily x 3 days, then 3 tabs PO daily x 3 days, then 2 tabs PO daily x 3 days, then 1 tab PO daily x 3 days  Dispense: 30 tablet; Refill: 0    2. Tobacco abuse  -     Varenicline Tartrate, Starter, 0.5 MG X 11 & 1 MG X 42 tablet therapy pack; Take 0.5 mg by mouth Daily for 3 days, THEN 0.5 mg 2 (Two) Times a Day for 4 days, THEN 1 mg 2 (Two) Times a Day for 21 days. Take 0.5 mg po daily x 3 days, then 0.5 mg po bid x 4 days, then 1 mg po bid  Dispense: 1 each; Refill: 0  -     varenicline (Chantix Continuing Month Michele) 1 MG tablet; Take 1 tablet by mouth 2 (Two) Times a Day for 56 days.  Dispense: 56 tablet; Refill: 1    3. Community acquired pneumonia, unspecified laterality  -     XR Chest PA & Lateral  -     cefTRIAXone (ROCEPHIN) injection 1 g  -     cefdinir (OMNICEF) 300 MG capsule; Take 1 capsule by mouth 2 (Two) Times a Day.  Dispense: 14 capsule; Refill: 0  -     doxycycline (VIBRAMYCIN) 100 MG capsule; Take 1 capsule by mouth 2 (Two) Times a Day.  Dispense: 14 capsule; Refill: 0    IM Rocephin x 1  Cefdinir and Doxycycline  Decadron x 1  PO Medrol dose pack  Will start Chantix to help with smoking cessation    Follow up if worsening or failing to improve         Follow Up   No follow-ups on file.  Patient was given instructions and counseling regarding her condition or for health maintenance advice. Please see specific information pulled into the AVS if appropriate.

## 2024-10-24 NOTE — TELEPHONE ENCOUNTER
Caller: Anastasiia Rubio    Relationship: Self    Best call back number: [phone]     Which medication are you concerned about: varenicline (Chantix Continuing Month Pak) 1 MG tablet      [address] [phone] [facility] [phone] FX      Who prescribed you this medication: DR. FRANK    When did you start taking this medication: TODAY 10.24.24    What are your concerns: TO STOP SMOKING

## 2024-10-25 ENCOUNTER — TELEPHONE (OUTPATIENT)
Dept: FAMILY MEDICINE CLINIC | Facility: CLINIC | Age: 54
End: 2024-10-25
Payer: COMMERCIAL

## 2024-10-25 DIAGNOSIS — F17.200 TOBACCO DEPENDENCE: Primary | ICD-10-CM

## 2024-10-25 DIAGNOSIS — R91.1 LUNG NODULE: ICD-10-CM

## 2024-10-25 RX ORDER — DOXYCYCLINE 100 MG/1
100 CAPSULE ORAL 2 TIMES DAILY
Qty: 14 CAPSULE | Refills: 0 | Status: SHIPPED | OUTPATIENT
Start: 2024-10-25

## 2024-10-25 NOTE — TELEPHONE ENCOUNTER
----- Message from Prakash Garcia sent at 10/25/2024  9:27 AM CDT -----  Let her know:  there is a spot on the x-ray that could be pneumonia.  Continue the antibiotic.  I'm going to send in a second antibiotic.  I want her to take both.  
Called and notified pt of results, voiced an understanding. Pt would like to have her yearly CT scan of her chest ordered.   
What Is The Reason For Today's Visit?: Full Body Skin Examination
What Is The Reason For Today's Visit? (Being Monitored For X): concerning skin lesions on an annual basis
Additional History: \\nPatient states she uses good sun protection while outdoors.

## 2024-10-29 NOTE — TELEPHONE ENCOUNTER
Called pharmacy to see if there was an issue with - reports that pt has picked up medication. Called pt back to see if there were and further issues/concerns. NA LVM - HUB to transfer

## 2024-10-30 ENCOUNTER — OFFICE VISIT (OUTPATIENT)
Dept: FAMILY MEDICINE CLINIC | Facility: CLINIC | Age: 54
End: 2024-10-30
Payer: COMMERCIAL

## 2024-10-30 VITALS
BODY MASS INDEX: 40.4 KG/M2 | DIASTOLIC BLOOD PRESSURE: 72 MMHG | TEMPERATURE: 98.1 F | HEART RATE: 64 BPM | HEIGHT: 63 IN | SYSTOLIC BLOOD PRESSURE: 132 MMHG | RESPIRATION RATE: 16 BRPM | OXYGEN SATURATION: 96 % | WEIGHT: 228 LBS

## 2024-10-30 DIAGNOSIS — E11.69 TYPE 2 DIABETES MELLITUS WITH OTHER SPECIFIED COMPLICATION, WITHOUT LONG-TERM CURRENT USE OF INSULIN: Primary | ICD-10-CM

## 2024-10-30 DIAGNOSIS — Z13.79 GENETIC TESTING: Primary | ICD-10-CM

## 2024-10-30 DIAGNOSIS — J44.9 CHRONIC OBSTRUCTIVE PULMONARY DISEASE, UNSPECIFIED COPD TYPE: ICD-10-CM

## 2024-10-30 DIAGNOSIS — F33.1 MODERATE EPISODE OF RECURRENT MAJOR DEPRESSIVE DISORDER: ICD-10-CM

## 2024-10-30 DIAGNOSIS — R05.1 ACUTE COUGH: ICD-10-CM

## 2024-10-30 LAB
NCCN CRITERIA FLAG: ABNORMAL
TYRER CUZICK SCORE: 10.8

## 2024-10-30 PROCEDURE — 99214 OFFICE O/P EST MOD 30 MIN: CPT | Performed by: NURSE PRACTITIONER

## 2024-10-30 PROCEDURE — 3075F SYST BP GE 130 - 139MM HG: CPT | Performed by: NURSE PRACTITIONER

## 2024-10-30 PROCEDURE — 1126F AMNT PAIN NOTED NONE PRSNT: CPT | Performed by: NURSE PRACTITIONER

## 2024-10-30 PROCEDURE — 3044F HG A1C LEVEL LT 7.0%: CPT | Performed by: NURSE PRACTITIONER

## 2024-10-30 PROCEDURE — 3078F DIAST BP <80 MM HG: CPT | Performed by: NURSE PRACTITIONER

## 2024-10-30 NOTE — PROGRESS NOTES
Chief Complaint  Diabetes (3 month follow up) and Results (X ray)    Subjective        Anastasiia Rubio presents to [facility]  Cough  This is a new problem. The current episode started in the past 7 days. The problem has been worse. The problem occurs every few hours. The cough is Productive of sputum. Associated symptoms include nasal congestion, shortness of breath and wheezing. Pertinent negatives include no chills, ear congestion, ear pain, fever, headaches, myalgias, sore throat or weight loss. The symptoms are aggravated by lying down. She has tried a beta-agonist inhaler and OTC cough suppressant for the symptoms. The treatment provided mild relief. Her past medical history is significant for asthma, bronchitis and COPD.   Diabetes  She presents for her follow-up diabetic visit. She has type 2 diabetes mellitus. No MedicAlert identification noted. The initial diagnosis of diabetes was made 5 years ago. Pertinent negatives for hypoglycemia include no confusion, dizziness, headaches or nervousness/anxiousness. Associated symptoms include polydipsia and polyuria. Pertinent negatives for diabetes include no blurred vision, no foot paresthesias, no polyphagia, no visual change and no weight loss. Pertinent negatives for hypoglycemia complications include no blackouts and no hospitalization. Symptoms are stable. Pertinent negatives for diabetic complications include no PVD or retinopathy. Risk factors for coronary artery disease include dyslipidemia, diabetes mellitus, hypertension, obesity, sedentary lifestyle and post-menopausal. Current diabetic treatment includes oral agent (dual therapy). Her weight is stable. She is following a generally healthy diet. She has not had a previous visit with a dietitian. She rarely participates in exercise. There is no change in her home blood glucose trend. Her highest blood glucose is 130-140 mg/dl. An ACE inhibitor/angiotensin II receptor blocker  is not being taken. She does not see a podiatrist. Eye exam is not current.   Hyperlipidemia  This is a chronic problem. The current episode started more than 1 year ago. The problem is uncontrolled. Recent lipid tests were reviewed and are high. Exacerbating diseases include diabetes and obesity. There are no known factors aggravating her hyperlipidemia. Associated symptoms include shortness of breath. Pertinent negatives include no myalgias. Current antihyperlipidemic treatment includes statins. The current treatment provides mild improvement of lipids. There are no compliance problems.  Risk factors for coronary artery disease include diabetes mellitus, dyslipidemia, family history, obesity and post-menopausal.   COPD  She complains of cough, shortness of breath, sputum production and wheezing. This is a chronic problem. The current episode started more than 1 year ago. The problem occurs daily. The problem has been gradually worsening. The cough is productive of sputum, dry and hacking. Associated symptoms include nasal congestion. Pertinent negatives include no ear congestion, ear pain, fever, headaches, malaise/fatigue, myalgias, sore throat or weight loss. Her symptoms are aggravated by any activity, exposure to smoke, lying down, climbing stairs and strenuous activity. Her symptoms are alleviated by beta-agonist and prescription cough suppressant. She reports minimal improvement on treatment. Her symptoms are not alleviated by rest, prescription cough suppressant and steroid inhaler. There are no known risk factors for lung disease. Her past medical history is significant for asthma, bronchitis and COPD.   Depression  Presents for follow-up visit. Symptoms include shortness of breath. Patient is not experiencing: compulsions, confusion, muscle tension, nervousness/anxiety, suicidal ideas, weight loss, dizziness and malaise/fatigue.Symptoms occur most days.  The severity of symptoms is moderate.  The patient  "sleeps 5 hours per night. The quality of sleep is poor. Awakens often during the night. Her past medical history is significant for asthma and depression. Past treatments include SSRI and lifestyle changes. The treatment provides moderate relief.   Shortness of Breath  This is a chronic problem. The current episode started more than 1 year ago. The problem occurs daily. The problem has been unchanged. Associated symptoms include sputum production and wheezing. Pertinent negatives include no ear pain, fever, headaches, sore throat or syncope. The symptoms are aggravated by any activity, weather changes, smoke, pollens and lying flat. The patient has no known risk factors for DVT/PE. She has tried prescription cough suppressants, steroid inhalers and beta agonist inhalers for the symptoms. The treatment provided mild relief. Her past medical history is significant for asthma and COPD. There is no history of aspirin allergies or PE. There has been no fever.     The following portions of the patient's history were reviewed and updated as appropriate: allergies, current medications, past family history, past medical history, past social history, past surgical history and problem list.    Objective   Vital Signs:  /72   Pulse 64   Temp 98.1 °F (36.7 °C)   Resp 16   Ht 160 cm (62.99\")   Wt 103 kg (228 lb)   SpO2 96%   BMI 40.40 kg/m²   Estimated body mass index is 40.4 kg/m² as calculated from the following:    Height as of this encounter: 160 cm (62.99\").    Weight as of this encounter: 103 kg (228 lb).    Physical Exam  Vitals and nursing note reviewed.   Constitutional:       General: She is awake.      Appearance: Normal appearance. She is well-developed and well-groomed. She is obese.   HENT:      Head: Normocephalic and atraumatic.      Right Ear: Hearing, tympanic membrane, ear canal and external ear normal.      Left Ear: Hearing, tympanic membrane, ear canal and external ear normal.      Nose: " Congestion present.      Mouth/Throat:      Lips: Pink.      Pharynx: Oropharynx is clear.   Eyes:      General: Lids are normal.      Conjunctiva/sclera: Conjunctivae normal.   Cardiovascular:      Rate and Rhythm: Normal rate and regular rhythm.      Heart sounds: Normal heart sounds.   Pulmonary:      Effort: Pulmonary effort is normal.      Breath sounds: Normal breath sounds and air entry.   Musculoskeletal:      Cervical back: Full passive range of motion without pain.      Right lower leg: No edema.      Left lower leg: No edema.   Lymphadenopathy:      Head:      Right side of head: No submental, submandibular or tonsillar adenopathy.      Left side of head: No submental, submandibular or tonsillar adenopathy.   Skin:     General: Skin is warm and dry.   Neurological:      Mental Status: She is alert.      Sensory: Sensation is intact.      Motor: Motor function is intact.      Coordination: Coordination is intact.      Gait: Gait is intact.   Psychiatric:         Attention and Perception: Attention and perception normal.         Mood and Affect: Mood and affect normal.         Speech: Speech normal.         Behavior: Behavior normal. Behavior is cooperative.         Thought Content: Thought content normal.         Cognition and Memory: Cognition and memory normal.         Judgment: Judgment normal.        Result Review :                Assessment and Plan   Diagnoses and all orders for this visit:    1. Type 2 diabetes mellitus with other specified complication, without long-term current use of insulin (Primary)      -  continue semaglutide as prescribed      -  Monitor blood sugar and follow up as needed     2. Acute cough      -  continue prescription cough med she has at house.       -  if no improvement follow up    3. Chronic obstructive pulmonary disease, unspecified COPD type      -  continue albuterol sulfate HFA       -  continue symbicort as prescribed       -  continue spiriva as prescribed         -  stop smoking and continue chantix as prescribed        - continue oxygen as prescribed     4. Moderate episode of recurrent major depressive disorder      - continue behavior modification as prescribed            Follow Up   Return in about 1 week (around 11/6/2024), or if symptoms worsen or fail to improve.    Patient was given instructions and counseling regarding her condition or for health maintenance advice. Please see specific information pulled into the AVS if appropriate.   Electronically signed by Nani Adkins DNP, APRALEX, 11/07/24, 10:45 AM MERI.

## 2024-11-01 ENCOUNTER — TELEPHONE (OUTPATIENT)
Dept: VASCULAR SURGERY | Facility: CLINIC | Age: 54
End: 2024-11-01
Payer: COMMERCIAL

## 2024-11-01 ENCOUNTER — HOSPITAL ENCOUNTER (OUTPATIENT)
Dept: CT IMAGING | Facility: HOSPITAL | Age: 54
Discharge: HOME | End: 2024-11-01
Payer: COMMERCIAL

## 2024-11-01 DIAGNOSIS — F17.200 TOBACCO DEPENDENCE: ICD-10-CM

## 2024-11-01 DIAGNOSIS — R91.1 LUNG NODULE: ICD-10-CM

## 2024-11-01 PROCEDURE — 71271 CT THORAX LUNG CANCER SCR C-: CPT

## 2024-11-25 ENCOUNTER — OFFICE VISIT (OUTPATIENT)
Dept: GASTROENTEROLOGY | Facility: CLINIC | Age: 54
End: 2024-11-25
Payer: COMMERCIAL

## 2024-11-25 VITALS
DIASTOLIC BLOOD PRESSURE: 78 MMHG | BODY MASS INDEX: 41.64 KG/M2 | OXYGEN SATURATION: 97 % | TEMPERATURE: 98.2 F | HEIGHT: 63 IN | WEIGHT: 235 LBS | SYSTOLIC BLOOD PRESSURE: 122 MMHG | HEART RATE: 83 BPM

## 2024-11-25 DIAGNOSIS — R10.10 UPPER ABDOMINAL PAIN: Primary | ICD-10-CM

## 2024-11-25 DIAGNOSIS — K21.9 GASTROESOPHAGEAL REFLUX DISEASE, UNSPECIFIED WHETHER ESOPHAGITIS PRESENT: ICD-10-CM

## 2024-11-25 DIAGNOSIS — K59.00 CONSTIPATION, UNSPECIFIED CONSTIPATION TYPE: ICD-10-CM

## 2024-11-25 PROCEDURE — 1159F MED LIST DOCD IN RCRD: CPT | Performed by: INTERNAL MEDICINE

## 2024-11-25 PROCEDURE — 3074F SYST BP LT 130 MM HG: CPT | Performed by: INTERNAL MEDICINE

## 2024-11-25 PROCEDURE — 99213 OFFICE O/P EST LOW 20 MIN: CPT | Performed by: INTERNAL MEDICINE

## 2024-11-25 PROCEDURE — 1160F RVW MEDS BY RX/DR IN RCRD: CPT | Performed by: INTERNAL MEDICINE

## 2024-11-25 PROCEDURE — 3078F DIAST BP <80 MM HG: CPT | Performed by: INTERNAL MEDICINE

## 2024-11-25 NOTE — PROGRESS NOTES
[facility]    Chief Complaint   Patient presents with    Abdominal Pain    Nausea       Subjective     HPI    Anastasiia Rubio is a 54 y.o. female who presents with a chief complaint of upper abdominal discomfort    When she was here in July she is feeling better.  She has chronic abdominal discomfort.  She is taking Bentyl as seem to help.  Lola advised on how to take it before meals.  We also talked about pursuing a CT scan.  Previously a CT was recommended but her insurance denied coverage.  Lola talked her about pursuing again but since she was feeling better declined to pursue.    She comes in today feeling okay.  She notes that she has had this discomfort off and on in her epigastric area since she had COVID in 2020.  Along with that she describes difficulty moving her bowels.  At times she will have abdominal bloating.  She has some regurgitation specially at nighttime when she lies down she wake up in the morning with a sour taste in her mouth.  She is no longer taking Bentyl.  She stopped a lot of her stomach meds.  Lately she has tried Pepcid Complete and will use that occasionally.  She states that does help when she takes.  Then she will wake up with troubles a few days later.  She took someone's Carafate and that helped her dyspepsia as well.    For bowel she takes Colace 2 tablets every morning that she takes too little pills which sounds like Dulcolax every evening.  She does not move her bowels every day.    ======= copy of July 3, 2024 HPI by Lola============  HISTORY OF PRESENT ILLNESS:      Anastasiia Rubio is a 54 y.o. female presents for f/u chronic abdominal pain. I prescribed bentyl and that has helped. She still has some episodes but it is not daily as before. Taking bentyl in am and pm ( not prior to eating). She quit drinking sodas. Trying to eat healthier. Some nausea.  No unintentional weight loss. No fever. No n/v.   .   Taking laxative every night x 3 mo. Having bm daily.  No rectal bleeding.         ========================================================================  Office visit  4-9-24  HPI  Anastasiia Rubio is a 54 y.o. female presents with abdominal pain. This is chronic for years. Occurs just above the umbilicus. Typically occurs after eating. No certain foods. Has some nausea. Tried omeprazole in the past the helped initially.  Has stress.  No unintentional weight loss. No fever. No vomiting.          She has bm every other day. No rectal bleeding. Takes stool softeners and laxative daily.            COLONOSCOPY (05/30/2023 11:35) recall 3 years.   Tissue Pathology Exam (05/30/2023 11:53) tubular adenomatous      UPPER GI ENDOSCOPY (05/30/2023 11:36) normal.        Past Medical History:   Diagnosis Date    Arthritis     Colon polyp     COPD (chronic obstructive pulmonary disease)     Cough 09/10/2013    COVID-19     CPAP (continuous positive airway pressure) dependence     Diabetes mellitus     GERD (gastroesophageal reflux disease)     Hyperlipidemia     Hypertension     Hypothyroidism 11/18/2013    Obesity     Sleep apnea     cpap with o2       Past Surgical History:   Procedure Laterality Date    AUGMENTATION MAMMAPLASTY      BREAST AUGMENTATION      COLONOSCOPY N/A 05/30/2023    Procedure: COLONOSCOPY WITH ANESTHESIA;  Surgeon: Christopher Freeman MD;  Location: [facility] ENDOSCOPY;  Service: Gastroenterology;  Laterality: N/A;  preop; epigastric pain   postop polyp  PCP Nani denis    CYSTOSCOPY W/ BULKING AGENT INJECTION N/A 10/09/2023    Procedure: CYSTOSCOPY WITH BULKING AGENT INJECTION;  Surgeon: Octaviano Wall MD;  Location: [facility] OR;  Service: Urology;  Laterality: N/A;    ENDOSCOPY N/A 05/30/2023    Procedure: ESOPHAGOGASTRODUODENOSCOPY WITH ANESTHESIA;  Surgeon: Christopher Freeman MD;  Location: [facility] ENDOSCOPY;  Service: Gastroenterology;  Laterality: N/A;  preop; epigastric pain  postop normal  PCP Nani denis    LAPAROSCOPIC CHOLECYSTECTOMY      TUBAL  ABDOMINAL LIGATION           Current Outpatient Medications:     albuterol (PROVENTIL) (2.5 MG/3ML) 0.083% nebulizer solution, Take 2.5 mg by nebulization Every 4 (Four) Hours As Needed for Wheezing., Disp: 90 mL, Rfl: 1    albuterol sulfate HFA (ProAir HFA) 108 (90 Base) MCG/ACT inhaler, Inhale 2 puffs Every 4 (Four) Hours As Needed for Wheezing., Disp: 8 g, Rfl: 11    atorvastatin (LIPITOR) 40 MG tablet, TAKE 1 TABLET BY MOUTH ONCE DAILY AT NIGHT, Disp: 90 tablet, Rfl: 0    azelastine (ASTEPRO) 0.15 % solution nasal spray, 2 sprays into the nostril(s) as directed by provider 2 (Two) Times a Day., Disp: 30 mL, Rfl: 2    Blood Glucose Monitoring Suppl (OneTouch Verio Flex System) w/Device kit, Test blood glucose level 1-2 times per day DX:E11.9, Disp: , Rfl:     budesonide-formoterol (SYMBICORT) 80-4.5 MCG/ACT inhaler, Inhale 2 puffs Every 6 (Six) Hours., Disp: 10.2 g, Rfl: 5    chlorthalidone (HYGROTON) 25 MG tablet, Take 1 tablet by mouth once daily, Disp: 90 tablet, Rfl: 0    ergocalciferol (ERGOCALCIFEROL) 1.25 MG (34979 UT) capsule, Take 1 capsule by mouth 2 (Two) Times a Week., Disp: 12 capsule, Rfl: 5    Lancets (OneTouch Delica Plus [id number]) misc, 1 each by Other route Daily., Disp: 100 each, Rfl: 5    metoprolol succinate XL (TOPROL-XL) 50 MG 24 hr tablet, Take 1 tablet by mouth Daily., Disp: 90 tablet, Rfl: 1    O2 (OXYGEN), Inhale 2 L/min 1 (One) Time. AS NEEDED, MOSTLY THROUGH NIGHT, Disp: , Rfl:     ondansetron ODT (ZOFRAN-ODT) 4 MG disintegrating tablet, Place 1 tablet on the tongue Every 8 (Eight) Hours As Needed for Nausea or Vomiting., Disp: 20 tablet, Rfl: 1    OneTouch Verio test strip, Check FBS daily and prn for Diabetes, Disp: 100 each, Rfl: 5    Semaglutide, 2 MG/DOSE, (Ozempic, 2 MG/DOSE,) 8 MG/3ML solution pen-injector, Inject 2 mg under the skin into the appropriate area as directed 1 (One) Time Per Week., Disp: 6 mL, Rfl: 2    varenicline (Chantix Continuing Month Michele) 1 MG tablet, Take  1 tablet by mouth 2 (Two) Times a Day for 56 days., Disp: 56 tablet, Rfl: 1    cefdinir (OMNICEF) 300 MG capsule, Take 1 capsule by mouth 2 (Two) Times a Day. (Patient not taking: Reported on 11/25/2024), Disp: 14 capsule, Rfl: 0    clobetasol (TEMOVATE) 0.05 % external solution, Apply  topically to the appropriate area as directed 2 (Two) Times a Day. (Patient not taking: Reported on 11/25/2024), Disp: 50 mL, Rfl: 1    diclofenac (VOLTAREN) 75 MG EC tablet, Take 1 tablet by mouth 2 (Two) Times a Day. (Patient not taking: Reported on 11/25/2024), Disp: 180 tablet, Rfl: 1    doxycycline (VIBRAMYCIN) 100 MG capsule, Take 1 capsule by mouth 2 (Two) Times a Day. (Patient not taking: Reported on 11/25/2024), Disp: 14 capsule, Rfl: 0    nystatin-zinc oxide, Apply  topically to the appropriate area as directed As Needed (yeast infection). (Patient not taking: Reported on 11/25/2024), Disp: 60 g, Rfl: 0    predniSONE (DELTASONE) 10 MG tablet, 4 tabs PO daily x 3 days, then 3 tabs PO daily x 3 days, then 2 tabs PO daily x 3 days, then 1 tab PO daily x 3 days (Patient not taking: Reported on 11/25/2024), Disp: 30 tablet, Rfl: 0    Spiriva Respimat 1.25 MCG/ACT aerosol solution inhaler, Inhale 2 puffs Daily. (Patient not taking: Reported on 11/25/2024), Disp: 4 g, Rfl: 2    Allergies   Allergen Reactions    Metformin Other (See Comments) and Myalgia     When taking metformin she feels run down, fatigued, and body aches.  Off it she improves       Social History     Socioeconomic History    Marital status:    Tobacco Use    Smoking status: Every Day     Current packs/day: 1.00     Average packs/day: 1 pack/day for 35.0 years (35.0 ttl pk-yrs)     Types: Cigarettes     Passive exposure: Past    Smokeless tobacco: Never   Vaping Use    Vaping status: Never Used   Substance and Sexual Activity    Alcohol use: Never    Drug use: Never    Sexual activity: Not Currently     Partners: Male     Birth control/protection: None      Comment: I do not use any form of birth control       Family History   Problem Relation Age of Onset    Heart failure Mother     Diabetes Mother         2000    Lung cancer Mother     Heart disease Mother     Cancer Mother         Lung    COPD Mother     Hypertension Mother     Heart attack Father     Heart disease Father     Diabetes Father         2010    Colon cancer Father     Cancer Father         Colon    Hyperlipidemia Father     Stroke Father     Hypertension Father     Alcohol abuse Sister     Colon cancer Brother     Cancer Brother         Colon    Alcohol abuse Brother     Drug abuse Brother         1998    Breast cancer Paternal Aunt     Cancer Paternal Grandfather         Lung Cancer       Review of Systems  She is gained a few pounds as last few weeks.  She originally had lost about 50 pounds but she took steroids and is now put some back on.    Objective     Vitals:    11/25/24 1448   BP: 122/78   Pulse: 83   Temp: 98.2 °F (36.8 °C)   SpO2: 97%       Physical Exam  Physical Exam  Vitals reviewed.   Constitutional:       Appearance: Normal appearance. He is not ill-appearing or diaphoretic.   Pulmonary:      Effort: Pulmonary effort is normal.   Neurological:      Mental Status: He is alert.   Psychiatric:         Thought Content: Thought content normal.         Judgment: Judgment normal.             Assessment & Plan   Problem List Items Addressed This Visit          Gastrointestinal Abdominal     GERD (gastroesophageal reflux disease)    Upper abdominal pain - Primary    Overview     Added automatically from request for surgery [id number]         Constipation         It sounds like the dyspepsia is related to acid reflux and along with some underlying constipation.  We talked about this.  We talked about reflux precautions.  Advised nothing to eat or drink 2 to 3 hours prior to lying down.  She does drink water at bedtime she will cut that out.  I advised also to elevate the head of her bed.  And if  needed take a Pepcid Complete once or twice daily or at bedtime if the first measures do not help.  In regards to the constipation I suggested MiraLAX and titrating the dose up or down to a dose that works well for her.  We talked at length about how to do this.  She expressed good understanding she is going to try these measures.  If she continues to have trouble she will come back and see us.    Continue ongoing management by primary care provider and other specialists.     Body mass index is 41.63 kg/m².  Elevated BMI, weight loss would be beneficial.      EMR Dragon/transcription disclaimer:  Much of this encounter note is electronic transcription/translation of spoken language to printed text.  The electronic translation of spoken language may be erroneous, or at times, nonsensical words or phrases may be inadvertently transcribed.  Although I have reviewed the note for such errors, some may still exist.    Christopher Freeman MD  15:41 CST  11/25/24

## 2024-12-04 ENCOUNTER — TELEPHONE (OUTPATIENT)
Dept: FAMILY MEDICINE CLINIC | Facility: CLINIC | Age: 54
End: 2024-12-04
Payer: COMMERCIAL

## 2024-12-04 NOTE — TELEPHONE ENCOUNTER
Caller: Anastasiia Rubio    Relationship: Self    Best call back number: [phone]    Any additional details: PATIENT IS REQUESTING A REFERRAL TO AN ENT WITHIN [facility].

## 2025-01-05 DIAGNOSIS — M54.32 SCIATICA OF LEFT SIDE: ICD-10-CM

## 2025-01-05 DIAGNOSIS — M25.552 LEFT HIP PAIN: ICD-10-CM

## 2025-01-06 RX ORDER — MELOXICAM 15 MG/1
15 TABLET ORAL DAILY
Qty: 90 TABLET | Refills: 0 | OUTPATIENT
Start: 2025-01-06

## 2025-01-07 ENCOUNTER — TELEPHONE (OUTPATIENT)
Dept: FAMILY MEDICINE CLINIC | Facility: CLINIC | Age: 55
End: 2025-01-07

## 2025-01-07 ENCOUNTER — OFFICE VISIT (OUTPATIENT)
Dept: FAMILY MEDICINE CLINIC | Facility: CLINIC | Age: 55
End: 2025-01-07
Payer: COMMERCIAL

## 2025-01-07 VITALS
WEIGHT: 246 LBS | RESPIRATION RATE: 20 BRPM | HEART RATE: 91 BPM | OXYGEN SATURATION: 95 % | TEMPERATURE: 97.8 F | BODY MASS INDEX: 43.59 KG/M2 | DIASTOLIC BLOOD PRESSURE: 77 MMHG | SYSTOLIC BLOOD PRESSURE: 117 MMHG | HEIGHT: 63 IN

## 2025-01-07 DIAGNOSIS — M25.511 PAIN OF BOTH SHOULDER JOINTS: ICD-10-CM

## 2025-01-07 DIAGNOSIS — E55.9 VITAMIN D DEFICIENCY: ICD-10-CM

## 2025-01-07 DIAGNOSIS — J44.1 COPD WITH EXACERBATION: ICD-10-CM

## 2025-01-07 DIAGNOSIS — Z99.89 CPAP (CONTINUOUS POSITIVE AIRWAY PRESSURE) DEPENDENCE: ICD-10-CM

## 2025-01-07 DIAGNOSIS — B37.9 YEAST INFECTION: ICD-10-CM

## 2025-01-07 DIAGNOSIS — J44.9 COPD MIXED TYPE: ICD-10-CM

## 2025-01-07 DIAGNOSIS — R11.0 NAUSEA: ICD-10-CM

## 2025-01-07 DIAGNOSIS — T78.40XA ALLERGY, INITIAL ENCOUNTER: ICD-10-CM

## 2025-01-07 DIAGNOSIS — G47.33 OBSTRUCTIVE SLEEP APNEA SYNDROME: ICD-10-CM

## 2025-01-07 DIAGNOSIS — E11.65 TYPE 2 DIABETES MELLITUS WITH HYPERGLYCEMIA, WITHOUT LONG-TERM CURRENT USE OF INSULIN: Primary | ICD-10-CM

## 2025-01-07 DIAGNOSIS — E78.2 MIXED HYPERLIPIDEMIA: ICD-10-CM

## 2025-01-07 DIAGNOSIS — M25.512 PAIN OF BOTH SHOULDER JOINTS: ICD-10-CM

## 2025-01-07 DIAGNOSIS — R06.89 BREATHING DIFFICULTY: ICD-10-CM

## 2025-01-07 DIAGNOSIS — I10 PRIMARY HYPERTENSION: ICD-10-CM

## 2025-01-07 DIAGNOSIS — K21.9 GASTROESOPHAGEAL REFLUX DISEASE WITHOUT ESOPHAGITIS: ICD-10-CM

## 2025-01-07 PROCEDURE — 1126F AMNT PAIN NOTED NONE PRSNT: CPT | Performed by: NURSE PRACTITIONER

## 2025-01-07 PROCEDURE — 99214 OFFICE O/P EST MOD 30 MIN: CPT | Performed by: NURSE PRACTITIONER

## 2025-01-07 PROCEDURE — 1159F MED LIST DOCD IN RCRD: CPT | Performed by: NURSE PRACTITIONER

## 2025-01-07 PROCEDURE — 1160F RVW MEDS BY RX/DR IN RCRD: CPT | Performed by: NURSE PRACTITIONER

## 2025-01-07 PROCEDURE — 3078F DIAST BP <80 MM HG: CPT | Performed by: NURSE PRACTITIONER

## 2025-01-07 PROCEDURE — 3074F SYST BP LT 130 MM HG: CPT | Performed by: NURSE PRACTITIONER

## 2025-01-07 RX ORDER — CELECOXIB 100 MG/1
100 CAPSULE ORAL 2 TIMES DAILY PRN
Qty: 180 CAPSULE | Refills: 1 | Status: SHIPPED | OUTPATIENT
Start: 2025-01-07

## 2025-01-07 RX ORDER — METHYLPREDNISOLONE 4 MG/1
TABLET ORAL
Qty: 21 TABLET | Refills: 0 | Status: SHIPPED | OUTPATIENT
Start: 2025-01-07

## 2025-01-07 RX ORDER — ONDANSETRON 4 MG/1
4 TABLET, ORALLY DISINTEGRATING ORAL EVERY 8 HOURS PRN
Qty: 20 TABLET | Refills: 1 | Status: SHIPPED | OUTPATIENT
Start: 2025-01-07

## 2025-01-07 RX ORDER — CHLORTHALIDONE 25 MG/1
25 TABLET ORAL DAILY
Qty: 90 TABLET | Refills: 0 | Status: SHIPPED | OUTPATIENT
Start: 2025-01-07

## 2025-01-07 RX ORDER — DOXYCYCLINE 100 MG/1
100 CAPSULE ORAL 2 TIMES DAILY
Qty: 20 CAPSULE | Refills: 0 | Status: SHIPPED | OUTPATIENT
Start: 2025-01-07 | End: 2025-01-17

## 2025-01-07 RX ORDER — FLUCONAZOLE 150 MG/1
150 TABLET ORAL ONCE
Qty: 1 TABLET | Refills: 0 | Status: SHIPPED | OUTPATIENT
Start: 2025-01-07 | End: 2025-01-07

## 2025-01-07 RX ORDER — TIOTROPIUM BROMIDE INHALATION SPRAY 1.56 UG/1
2 SPRAY, METERED RESPIRATORY (INHALATION)
Qty: 4 G | Refills: 2 | Status: SHIPPED | OUTPATIENT
Start: 2025-01-07

## 2025-01-07 RX ORDER — ERGOCALCIFEROL 1.25 MG/1
50000 CAPSULE ORAL 2 TIMES WEEKLY
Qty: 12 CAPSULE | Refills: 5 | Status: SHIPPED | OUTPATIENT
Start: 2025-01-09

## 2025-01-07 RX ORDER — ATORVASTATIN CALCIUM 40 MG/1
40 TABLET, FILM COATED ORAL NIGHTLY
Qty: 90 TABLET | Refills: 0 | Status: SHIPPED | OUTPATIENT
Start: 2025-01-07

## 2025-01-07 RX ORDER — ALBUTEROL SULFATE 90 UG/1
2 INHALANT RESPIRATORY (INHALATION) EVERY 4 HOURS PRN
Qty: 8 G | Refills: 11 | Status: SHIPPED | OUTPATIENT
Start: 2025-01-07

## 2025-01-07 RX ORDER — METOPROLOL SUCCINATE 50 MG/1
50 TABLET, EXTENDED RELEASE ORAL DAILY
Qty: 90 TABLET | Refills: 1 | Status: SHIPPED | OUTPATIENT
Start: 2025-01-07

## 2025-01-07 RX ORDER — SUCRALFATE 1 G/1
1 TABLET ORAL 4 TIMES DAILY
Qty: 90 TABLET | Refills: 1 | Status: SHIPPED | OUTPATIENT
Start: 2025-01-07

## 2025-01-07 NOTE — TELEPHONE ENCOUNTER
Caller: Anastasiia Rubio    Relationship: Self    Best call back number: [phone]     What medication are you requesting: ZEPBOUND &     Spiriva Respimat 1.25 MCG/ACT aerosol solution inhaler  2 puff, Daily - RT                    What are your current symptoms: DIABETES    Have you had these symptoms before:    [x] Yes  [] No    Have you been treated for these symptoms before:   [] Yes  [x] No    If a prescription is needed, what is your preferred pharmacy and phone number: [facility] [address] [place] [address] [phone] [facility] [phone] FX     Additional notes: PATIENT STATED THAT HER PHARMACY TOLD HER THAT SHE HAS TO HAVE A P.A. FOR HER INSURANCE
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
## 2025-01-08 ENCOUNTER — TELEPHONE (OUTPATIENT)
Dept: FAMILY MEDICINE CLINIC | Facility: CLINIC | Age: 55
End: 2025-01-08
Payer: COMMERCIAL

## 2025-01-08 ENCOUNTER — TELEPHONE (OUTPATIENT)
Dept: FAMILY MEDICINE CLINIC | Facility: CLINIC | Age: 55
End: 2025-01-08

## 2025-01-08 NOTE — TELEPHONE ENCOUNTER
Caller: Anastasiia Rubio    Relationship: Self    Best call back number:  [phone]     What is the best time to reach you: ANY    Who are you requesting to speak with (clinical staff, provider,  specific staff member): CLINICAL       What was the call regardin. WANTS TO GET ON PATCHES FOR QUITTING SMOKING AND 2.  WANTS TO START BACK OZEMPIC, OBVIOUSLY NOT THE HIGHEST DOSE, OR WHAT SHE WAS ON BEFORE.  PLEASE CALL BACK.

## 2025-01-09 ENCOUNTER — PATIENT MESSAGE (OUTPATIENT)
Dept: FAMILY MEDICINE CLINIC | Facility: CLINIC | Age: 55
End: 2025-01-09
Payer: COMMERCIAL

## 2025-01-09 RX ORDER — NYSTATIN 100000 U/G
OINTMENT TOPICAL
Qty: 60 G | Refills: 0 | Status: SHIPPED | OUTPATIENT
Start: 2025-01-09

## 2025-01-09 NOTE — TELEPHONE ENCOUNTER
Rx Refill Note  Requested Prescriptions     Pending Prescriptions Disp Refills    nystatin (MYCOSTATIN) 553711 UNIT/GM ointment [Pharmacy Med Name: Nystatin [id number] UNIT/GM External Ointment] 60 g 0     Sig: MIX WITH EQUAL PARTS DESITIN AND APPLY   TOPICALLY TO AFFECTED AREA TWICE DAILY      Last office visit with prescribing clinician: 1/7/2025   Last telemedicine visit with prescribing clinician: Visit date not found   Next office visit with prescribing clinician: 1/8/2025                         Would you like a call back once the refill request has been completed: [] Yes [] No    If the office needs to give you a call back, can they leave a voicemail: [] Yes [] No    Vilma Wynne LPN  01/09/25, 08:47 CST

## 2025-01-13 ENCOUNTER — TELEPHONE (OUTPATIENT)
Dept: FAMILY MEDICINE CLINIC | Facility: CLINIC | Age: 55
End: 2025-01-13
Payer: COMMERCIAL

## 2025-01-13 DIAGNOSIS — E11.65 TYPE 2 DIABETES MELLITUS WITH HYPERGLYCEMIA, WITHOUT LONG-TERM CURRENT USE OF INSULIN: Primary | ICD-10-CM

## 2025-01-13 RX ORDER — SEMAGLUTIDE 0.68 MG/ML
INJECTION, SOLUTION SUBCUTANEOUS
Qty: 5 ML | Refills: 0 | Status: SHIPPED | OUTPATIENT
Start: 2025-01-13 | End: 2025-03-10

## 2025-01-13 NOTE — TELEPHONE ENCOUNTER
PA denied- pt must have at least a 3 month try/fail to at least 2 preferred medications listed below:  Byetta   Ozempic  Slava   Trulicity     Pt has only tried/failed ozempic- nausea     Pt presents in office to check on status of this- advised that PA for mounjaro was denied and asked about Ozempic. Pt reports that she was previously on ozempic 2mg dose and had several deaths in family and missed about 4 weeks of medication, then once she remembered she tried to start back on medication at the 2mg dose and it made her very nauseous. Explained to pt that if she misses more than 2 consecutive doses, that she has to restart at the starting dose and titrate back up. Pt was unaware, and would liek to restart medication as she tolerated until then.  Pended if agreeable. Last A1C2/2024-5.7   Problem: Discharge Planning  Goal: Discharge to home or other facility with appropriate resources  12/28/2023 0831 by Veto Guzman RN  Outcome: Progressing  Flowsheets (Taken 12/28/2023 0809)  Discharge to home or other facility with appropriate resources: Identify barriers to discharge with patient and caregiver  12/27/2023 1939 by Grzegorz Plata RN  Outcome: Progressing     Problem: Safety - Adult  Goal: Free from fall injury  12/28/2023 0831 by Veto Guzman RN  Outcome: Progressing  Flowsheets (Taken 12/28/2023 0830)  Free From Fall Injury: Instruct family/caregiver on patient safety  12/27/2023 1939 by Grzegorz Plata RN  Outcome: Progressing     Problem: ABCDS Injury Assessment  Goal: Absence of physical injury  12/28/2023 0831 by Veto Guzman RN  Outcome: Progressing  Flowsheets (Taken 12/28/2023 0830)  Absence of Physical Injury: Implement safety measures based on patient assessment  12/27/2023 1939 by Grzegorz Plata RN  Outcome: Progressing

## 2025-01-13 NOTE — TELEPHONE ENCOUNTER
Caller: Anastasiia Rubio    Relationship: Self    Best call back number: [phone]     Requested Prescriptions:   Requested Prescriptions      No prescriptions requested or ordered in this encounter   OZEMPIC (NOT SHOWING IN ACTIVE)     Pharmacy where request should be sent: [facility] [address] [place] [address] [phone] [facility] [phone] FX     Last office visit with prescribing clinician: 1/7/2025   Last telemedicine visit with prescribing clinician: Visit date not found   Next office visit with prescribing clinician: 4/7/2025     Additional details provided by patient: PATIENT WANTS TO START OVER WITH LOWEST DOSAGE     Does the patient have less than a 3 day supply:  [x] Yes  [] No    Would you like a call back once the refill request has been completed: [] Yes [x] No    If the office needs to give you a call back, can they leave a voicemail: [] Yes [x] No    Sav Osborne III, RegSched Rep   01/13/25 14:57 CST

## 2025-01-27 ENCOUNTER — TELEPHONE (OUTPATIENT)
Dept: NEUROLOGY | Age: 55
End: 2025-01-27

## 2025-01-27 NOTE — TELEPHONE ENCOUNTER
Pt called to schedule from referral from Leeanna Howell, however office hasn't contacted pt to schedule. Please contact pt when able, thank you.

## 2025-01-29 NOTE — TELEPHONE ENCOUNTER
Patient has been scheduled. Patient has been called with appointment information. Patient is on wait list.

## 2025-02-07 DIAGNOSIS — I73.9 PAD (PERIPHERAL ARTERY DISEASE): Primary | ICD-10-CM

## 2025-02-07 DIAGNOSIS — I65.23 BILATERAL CAROTID ARTERY STENOSIS: ICD-10-CM

## 2025-02-10 NOTE — PROGRESS NOTES
Subjective    Ms. Rubio is 55 y.o. female    Chief Complaint: urinary incontinence    History of Present Illness    55-year-old female established patient in with report return of incontinence.  Symptom onset November 2024 after respiratory illness.  Reports initially started out after a large cough.  Now battling more urinary leakage from inability to make it to the bathroom in time also during the night upon awakening.  Reports very small leaks with coughing, laughing or sneezing.  Biggest issue at present which is now requiring depends usage is the urgency.  No prior OAB meds tried.    Previously underwent Bulkamid Dr. Wall 10/2023 due to stress incontinence/ISD.  At which time noted complete resolution.      The following portions of the patient's history were reviewed and updated as appropriate: allergies, current medications, past family history, past medical history, past social history, past surgical history and problem list.    Review of Systems   Constitutional:  Negative for chills, fatigue and fever.   Gastrointestinal:  Negative for nausea and vomiting.   Genitourinary:  Positive for frequency and urgency.         Current Outpatient Medications:     albuterol (PROVENTIL) (2.5 MG/3ML) 0.083% nebulizer solution, Take 2.5 mg by nebulization Every 4 (Four) Hours As Needed for Wheezing., Disp: 90 mL, Rfl: 1    albuterol sulfate HFA (ProAir HFA) 108 (90 Base) MCG/ACT inhaler, Inhale 2 puffs Every 4 (Four) Hours As Needed for Wheezing., Disp: 8 g, Rfl: 11    atorvastatin (LIPITOR) 40 MG tablet, Take 1 tablet by mouth Every Night., Disp: 90 tablet, Rfl: 0    azelastine (ASTEPRO) 0.15 % solution nasal spray, 2 sprays into the nostril(s) as directed by provider 2 (Two) Times a Day., Disp: 30 mL, Rfl: 2    Blood Glucose Monitoring Suppl (OneTouch Verio Flex System) w/Device kit, Test blood glucose level 1-2 times per day DX:E11.9, Disp: , Rfl:     budesonide-formoterol (SYMBICORT) 80-4.5 MCG/ACT inhaler, Inhale  2 puffs Every 6 (Six) Hours., Disp: 10.2 g, Rfl: 5    celecoxib (CeleBREX) 100 MG capsule, Take 1 capsule by mouth 2 (Two) Times a Day As Needed for Mild Pain., Disp: 180 capsule, Rfl: 1    chlorthalidone (HYGROTON) 25 MG tablet, Take 1 tablet by mouth Daily., Disp: 90 tablet, Rfl: 0    clobetasol (TEMOVATE) 0.05 % external solution, Apply  topically to the appropriate area as directed 2 (Two) Times a Day., Disp: 50 mL, Rfl: 1    ergocalciferol (ERGOCALCIFEROL) 1.25 MG (32741 UT) capsule, Take 1 capsule by mouth 2 (Two) Times a Week., Disp: 12 capsule, Rfl: 5    Lancets (OneTouch Delica Plus [id number]) misc, 1 each by Other route Daily., Disp: 100 each, Rfl: 5    methylPREDNISolone (MEDROL) 4 MG dose pack, Take as directed on package instructions., Disp: 21 tablet, Rfl: 0    metoprolol succinate XL (TOPROL-XL) 50 MG 24 hr tablet, Take 1 tablet by mouth Daily., Disp: 90 tablet, Rfl: 1    nystatin (MYCOSTATIN) 200038 UNIT/GM ointment, MIX WITH EQUAL PARTS DESITIN AND APPLY   TOPICALLY TO AFFECTED AREA TWICE DAILY, Disp: 60 g, Rfl: 0    nystatin-zinc oxide, Apply  topically to the appropriate area as directed As Needed (yeast infection)., Disp: 60 g, Rfl: 0    O2 (OXYGEN), Inhale 2 L/min 1 (One) Time. AS NEEDED, MOSTLY THROUGH NIGHT, Disp: , Rfl:     ondansetron ODT (ZOFRAN-ODT) 4 MG disintegrating tablet, Place 1 tablet on the tongue Every 8 (Eight) Hours As Needed for Nausea or Vomiting., Disp: 20 tablet, Rfl: 1    OneTouch Verio test strip, Check FBS daily and prn for Diabetes, Disp: 100 each, Rfl: 5    Semaglutide,0.25 or 0.5MG/DOS, (Ozempic, 0.25 or 0.5 MG/DOSE,) 2 MG/3ML solution pen-injector, Inject 0.25 mg under the skin into the appropriate area as directed 1 (One) Time Per Week for 28 days, THEN 0.5 mg 1 (One) Time Per Week for 28 days., Disp: 5 mL, Rfl: 0    Spiriva Respimat 1.25 MCG/ACT aerosol solution inhaler, Inhale 2 puffs Daily., Disp: 4 g, Rfl: 2    sucralfate (Carafate) 1 g tablet, Take 1 tablet by  mouth 4 (Four) Times a Day., Disp: 90 tablet, Rfl: 1    Tirzepatide 2.5 MG/0.5ML solution auto-injector, Inject 2.5 mg under the skin into the appropriate area as directed 1 (One) Time Per Week., Disp: 6 mL, Rfl: 0    fluconazole (DIFLUCAN) 150 MG tablet, Take 1 tablet by mouth. (Patient not taking: Reported on 2/13/2025), Disp: , Rfl:     oxybutynin XL (DITROPAN-XL) 10 MG 24 hr tablet, Take 1 tablet by mouth Daily., Disp: 30 tablet, Rfl: 11    Past Medical History:   Diagnosis Date    Arthritis     Colon polyp     COPD (chronic obstructive pulmonary disease)     Cough 09/10/2013    COVID-19     CPAP (continuous positive airway pressure) dependence     Diabetes mellitus     GERD (gastroesophageal reflux disease)     Hyperlipidemia     Hypertension     Hypothyroidism 11/18/2013    Obesity     Sleep apnea     cpap with o2       Past Surgical History:   Procedure Laterality Date    AUGMENTATION MAMMAPLASTY      BREAST AUGMENTATION      COLONOSCOPY N/A 05/30/2023    Procedure: COLONOSCOPY WITH ANESTHESIA;  Surgeon: Christopher Freeman MD;  Location: [facility] ENDOSCOPY;  Service: Gastroenterology;  Laterality: N/A;  preop; epigastric pain   postop polyp  PCP Nani denis    CYSTOSCOPY W/ BULKING AGENT INJECTION N/A 10/09/2023    Procedure: CYSTOSCOPY WITH BULKING AGENT INJECTION;  Surgeon: Octaviano Wall MD;  Location: [facility] OR;  Service: Urology;  Laterality: N/A;    ENDOSCOPY N/A 05/30/2023    Procedure: ESOPHAGOGASTRODUODENOSCOPY WITH ANESTHESIA;  Surgeon: Christopher Freeman MD;  Location: [facility] ENDOSCOPY;  Service: Gastroenterology;  Laterality: N/A;  preop; epigastric pain  postop normal  PCP Nani denis    LAPAROSCOPIC CHOLECYSTECTOMY      TUBAL ABDOMINAL LIGATION         Social History     Socioeconomic History    Marital status:    Tobacco Use    Smoking status: Every Day     Current packs/day: 1.00     Average packs/day: 1 pack/day for 35.0 years (35.0 ttl pk-yrs)     Types: Cigarettes     Passive  "exposure: Past    Smokeless tobacco: Never   Vaping Use    Vaping status: Never Used   Substance and Sexual Activity    Alcohol use: Never    Drug use: Never    Sexual activity: Not Currently     Partners: Male     Birth control/protection: None     Comment: I do not use any form of birth control       Family History   Problem Relation Age of Onset    Heart failure Mother     Diabetes Mother         2000    Lung cancer Mother     Heart disease Mother     Cancer Mother         Lung    COPD Mother     Hypertension Mother     Heart attack Father     Heart disease Father     Diabetes Father         2010    Colon cancer Father     Cancer Father         Colon    Hyperlipidemia Father     Stroke Father     Hypertension Father     Alcohol abuse Sister     Colon cancer Brother     Cancer Brother         Colon    Alcohol abuse Brother     Drug abuse Brother         1998    Breast cancer Paternal Aunt     Cancer Paternal Grandfather         Lung Cancer       Objective    Temp 97.5 °F (36.4 °C)   Ht 160 cm (62.99\")   Wt 112 kg (246 lb)   LMP  (LMP Unknown) Comment: lmp January  BMI 43.59 kg/m²     Physical Exam  Nursing note reviewed.   Constitutional:       General: She is not in acute distress.     Appearance: Normal appearance. She is not ill-appearing.   HENT:      Nose: No congestion.   Abdominal:      Tenderness: There is no right CVA tenderness or left CVA tenderness.      Hernia: No hernia is present.   Skin:     General: Skin is warm and dry.   Neurological:      Mental Status: She is alert and oriented to person, place, and time.   Psychiatric:         Mood and Affect: Mood normal.         Behavior: Behavior normal.             Results for orders placed or performed in visit on 10/30/24   [facility] GENETIC RISK ASSESSMENT QUESTIONNAIRE - ,    Collection Time: 10/30/24 11:49 AM   Result Value Ref Range    TyrerCuzick 10.8     NCCN NCCN met (A)      Assessment and Plan    Diagnoses and all orders for this visit:    1. " Stress incontinence (female) (male) (Primary)  -     Cancel: POC Urinalysis Dipstick, Multipro    2. Mixed incontinence  -     oxybutynin XL (DITROPAN-XL) 10 MG 24 hr tablet; Take 1 tablet by mouth Daily.  Dispense: 30 tablet; Refill: 11      Now battling what sounds to be more urge incontinence than stress incontinence.  No prior overactive bladder medications tried.  Will start on oxybutynin XL 10 mg daily    Follow-up 2 months

## 2025-02-13 ENCOUNTER — OFFICE VISIT (OUTPATIENT)
Dept: UROLOGY | Facility: CLINIC | Age: 55
End: 2025-02-13
Payer: COMMERCIAL

## 2025-02-13 VITALS — WEIGHT: 246 LBS | TEMPERATURE: 97.5 F | BODY MASS INDEX: 43.59 KG/M2 | HEIGHT: 63 IN

## 2025-02-13 DIAGNOSIS — N39.3 STRESS INCONTINENCE (FEMALE) (MALE): Primary | ICD-10-CM

## 2025-02-13 DIAGNOSIS — N39.46 MIXED INCONTINENCE: ICD-10-CM

## 2025-02-13 DIAGNOSIS — I10 PRIMARY HYPERTENSION: ICD-10-CM

## 2025-02-13 DIAGNOSIS — E78.2 MIXED HYPERLIPIDEMIA: ICD-10-CM

## 2025-02-13 DIAGNOSIS — E11.65 TYPE 2 DIABETES MELLITUS WITH HYPERGLYCEMIA, WITHOUT LONG-TERM CURRENT USE OF INSULIN: ICD-10-CM

## 2025-02-13 DIAGNOSIS — B37.9 YEAST INFECTION: Primary | ICD-10-CM

## 2025-02-13 DIAGNOSIS — B37.9 YEAST INFECTION: ICD-10-CM

## 2025-02-13 RX ORDER — ATORVASTATIN CALCIUM 40 MG/1
40 TABLET, FILM COATED ORAL NIGHTLY
Qty: 90 TABLET | Refills: 0 | Status: CANCELLED | OUTPATIENT
Start: 2025-02-13

## 2025-02-13 RX ORDER — NYSTATIN 100000 [USP'U]/G
POWDER TOPICAL
Qty: 60 G | Refills: 0 | Status: SHIPPED | OUTPATIENT
Start: 2025-02-13

## 2025-02-13 RX ORDER — SEMAGLUTIDE 0.68 MG/ML
INJECTION, SOLUTION SUBCUTANEOUS
Qty: 5 ML | Refills: 0 | Status: CANCELLED | OUTPATIENT
Start: 2025-02-13 | End: 2025-04-10

## 2025-02-13 RX ORDER — METOPROLOL SUCCINATE 50 MG/1
50 TABLET, EXTENDED RELEASE ORAL DAILY
Qty: 90 TABLET | Refills: 1 | Status: CANCELLED | OUTPATIENT
Start: 2025-02-13

## 2025-02-13 RX ORDER — FLUCONAZOLE 150 MG/1
150 TABLET ORAL
COMMUNITY
Start: 2025-01-07 | End: 2025-02-17

## 2025-02-13 RX ORDER — NYSTATIN 100000 U/G
OINTMENT TOPICAL
Qty: 60 G | Refills: 0 | Status: CANCELLED | OUTPATIENT
Start: 2025-02-13

## 2025-02-13 RX ORDER — OXYBUTYNIN CHLORIDE 10 MG/1
10 TABLET, EXTENDED RELEASE ORAL DAILY
Qty: 30 TABLET | Refills: 11 | Status: SHIPPED | OUTPATIENT
Start: 2025-02-13

## 2025-02-13 RX ORDER — CHLORTHALIDONE 25 MG/1
25 TABLET ORAL DAILY
Qty: 90 TABLET | Refills: 0 | Status: CANCELLED | OUTPATIENT
Start: 2025-02-13

## 2025-02-13 NOTE — TELEPHONE ENCOUNTER
Rx Refill Note  Requested Prescriptions     Pending Prescriptions Disp Refills    metoprolol succinate XL (TOPROL-XL) 50 MG 24 hr tablet 90 tablet 1     Sig: Take 1 tablet by mouth Daily.    chlorthalidone (HYGROTON) 25 MG tablet 90 tablet 0     Sig: Take 1 tablet by mouth Daily.    Semaglutide,0.25 or 0.5MG/DOS, (Ozempic, 0.25 or 0.5 MG/DOSE,) 2 MG/3ML solution pen-injector 5 mL 0     Sig: Inject 0.25 mg under the skin into the appropriate area as directed 1 (One) Time Per Week for 28 days, THEN 0.5 mg 1 (One) Time Per Week for 28 days.    atorvastatin (LIPITOR) 40 MG tablet 90 tablet 0     Sig: Take 1 tablet by mouth Every Night.      Last office visit with prescribing clinician: 1/7/2025     Next office visit with prescribing clinician: 4/7/2025       LRX:1/7/2025-3 months- was too soon to fill on 1/7/25  LRX:1/7/2025-3 months - was too soon to fill on 1/7/25  LRX:1/7/2025-56 days - picked up on 1/14/2025  LRX:1/7/2025-3 months- picked up on 1/7/2025      Spoke with pharmacy to confirm        Radha Fajardo MA  02/13/25, 16:34 CST

## 2025-02-13 NOTE — TELEPHONE ENCOUNTER
SHE IS COMPLETELY WITHOUT MED AND WOULD LIKE FILLED ASAP IF POSSIBLE PLEASE.     ON THE OZEMPIC SHE NEEDS THE NEXT DOSAGE UP PLEASE.     NYSTATIN SHE WANTS THE POWDER AND NOT THE CREAM.

## 2025-02-14 RX ORDER — NYSTATIN 100000 [USP'U]/G
POWDER TOPICAL
Qty: 60 G | Refills: 0 | OUTPATIENT
Start: 2025-02-14

## 2025-02-14 RX ORDER — SEMAGLUTIDE 0.68 MG/ML
INJECTION, SOLUTION SUBCUTANEOUS
Qty: 5 ML | Refills: 0 | OUTPATIENT
Start: 2025-02-14 | End: 2025-04-10

## 2025-02-14 RX ORDER — CHLORTHALIDONE 25 MG/1
25 TABLET ORAL DAILY
Qty: 90 TABLET | Refills: 0 | OUTPATIENT
Start: 2025-02-14

## 2025-02-14 RX ORDER — ATORVASTATIN CALCIUM 40 MG/1
40 TABLET, FILM COATED ORAL NIGHTLY
Qty: 90 TABLET | Refills: 0 | OUTPATIENT
Start: 2025-02-14

## 2025-02-14 RX ORDER — METOPROLOL SUCCINATE 50 MG/1
50 TABLET, EXTENDED RELEASE ORAL DAILY
Qty: 90 TABLET | Refills: 1 | OUTPATIENT
Start: 2025-02-14

## 2025-02-14 NOTE — TELEPHONE ENCOUNTER
Per Dr. Garcia--- Patient needs to have A1C, kidney function and cholesterol checked.      Patient advised to stop in for labs.  She currently has enough meds and will stop in the office on Monday for blood work.

## 2025-02-17 ENCOUNTER — OFFICE VISIT (OUTPATIENT)
Dept: OTOLARYNGOLOGY | Facility: CLINIC | Age: 55
End: 2025-02-17
Payer: COMMERCIAL

## 2025-02-17 ENCOUNTER — TELEPHONE (OUTPATIENT)
Dept: FAMILY MEDICINE CLINIC | Facility: CLINIC | Age: 55
End: 2025-02-17
Payer: COMMERCIAL

## 2025-02-17 ENCOUNTER — PATIENT ROUNDING (BHMG ONLY) (OUTPATIENT)
Dept: OTOLARYNGOLOGY | Facility: CLINIC | Age: 55
End: 2025-02-17
Payer: COMMERCIAL

## 2025-02-17 ENCOUNTER — CLINICAL SUPPORT (OUTPATIENT)
Dept: FAMILY MEDICINE CLINIC | Facility: CLINIC | Age: 55
End: 2025-02-17
Payer: COMMERCIAL

## 2025-02-17 VITALS
BODY MASS INDEX: 43.94 KG/M2 | TEMPERATURE: 97.7 F | WEIGHT: 248 LBS | DIASTOLIC BLOOD PRESSURE: 85 MMHG | HEART RATE: 89 BPM | SYSTOLIC BLOOD PRESSURE: 126 MMHG | HEIGHT: 63 IN

## 2025-02-17 DIAGNOSIS — J34.829 NASAL VALVE COLLAPSE: ICD-10-CM

## 2025-02-17 DIAGNOSIS — J34.3 HYPERTROPHY OF INFERIOR NASAL TURBINATE: ICD-10-CM

## 2025-02-17 DIAGNOSIS — J34.2 DEVIATED NASAL SEPTUM: Primary | ICD-10-CM

## 2025-02-17 DIAGNOSIS — E11.65 TYPE 2 DIABETES MELLITUS WITH HYPERGLYCEMIA, WITHOUT LONG-TERM CURRENT USE OF INSULIN: ICD-10-CM

## 2025-02-17 DIAGNOSIS — Z13.79 GENETIC TESTING: ICD-10-CM

## 2025-02-17 DIAGNOSIS — Z71.6 ENCOUNTER FOR SMOKING CESSATION COUNSELING: Primary | ICD-10-CM

## 2025-02-17 PROCEDURE — 3074F SYST BP LT 130 MM HG: CPT | Performed by: EMERGENCY MEDICINE

## 2025-02-17 PROCEDURE — 1159F MED LIST DOCD IN RCRD: CPT | Performed by: EMERGENCY MEDICINE

## 2025-02-17 PROCEDURE — 99213 OFFICE O/P EST LOW 20 MIN: CPT | Performed by: EMERGENCY MEDICINE

## 2025-02-17 PROCEDURE — 3079F DIAST BP 80-89 MM HG: CPT | Performed by: EMERGENCY MEDICINE

## 2025-02-17 PROCEDURE — 1160F RVW MEDS BY RX/DR IN RCRD: CPT | Performed by: EMERGENCY MEDICINE

## 2025-02-17 NOTE — TELEPHONE ENCOUNTER
Patient is requesting patches to help stop smoking. Please send to [facility] in [place] at [phone]

## 2025-02-17 NOTE — PROGRESS NOTES
[facility]AR [facility]SE & THROAT  [address], [address]  [place] [address]  Fax [phone]  Phone [phone]      Visit Type: NEW PATIENT   Chief Complaint   Patient presents with    [facility]     New pt here today for breathing difficulty due to allergies, referred by Nani Adkins. Patient states she feels like breathing difficulty is not d/t allergies. She states she was admitted to hospital for COVID in 2020 and sinus issues arose during her stay and have continued. She reports she has used several nasal sprays with no relief. Patient is CPAP dependent at night, hx of continuous O2 use.            HPI      History of Present Illness  The patient is a 55-year-old female who presents as a new patient for evaluation of allergies.    She reports that her symptoms began following a COVID-19 infection in 2020, which required a 3-month hospitalization. During this period, she experienced significant facial discomfort due to the drying effect of supplemental oxygen on her sinuses. Despite treatment, she has continued to experience respiratory issues, particularly on the left side, characterized by difficulty in inhaling air. She has been using a nasal CPAP machine since 2020 but has recently discontinued its use at night due to increased discomfort. She reports no history of nasal trauma. Additionally, she notes that blowing her nose often results in throat irritation and infrequently produces any discharge. She has attempted to manage these symptoms with various over-the-counter nasal sprays, including Astelin and Flonase, but has not found relief.    MEDICATIONS  Current: Astelin, Flonase    Results      Past Medical History:   Diagnosis Date    Arthritis     Colon polyp     COPD (chronic obstructive pulmonary disease)     Cough 09/10/2013    COVID-19     CPAP (continuous positive airway pressure) dependence     Diabetes  mellitus     GERD (gastroesophageal reflux disease)     Hyperlipidemia     Hypertension     Hypothyroidism 11/18/2013    Obesity     Sleep apnea     cpap with o2       Past Surgical History:   Procedure Laterality Date    AUGMENTATION MAMMAPLASTY      COLONOSCOPY N/A 05/30/2023    Procedure: COLONOSCOPY WITH ANESTHESIA;  Surgeon: Christopher Freeman MD;  Location: [facility] ENDOSCOPY;  Service: Gastroenterology;  Laterality: N/A;  preop; epigastric pain   postop polyp  PCP Nani denis    CYSTOSCOPY W/ BULKING AGENT INJECTION N/A 10/09/2023    Procedure: CYSTOSCOPY WITH BULKING AGENT INJECTION;  Surgeon: Octaviano Wall MD;  Location: [facility] OR;  Service: Urology;  Laterality: N/A;    ENDOSCOPY N/A 05/30/2023    Procedure: ESOPHAGOGASTRODUODENOSCOPY WITH ANESTHESIA;  Surgeon: Christopher Freeman MD;  Location: [facility] ENDOSCOPY;  Service: Gastroenterology;  Laterality: N/A;  preop; epigastric pain  postop normal  PCP Nani denis    LAPAROSCOPIC CHOLECYSTECTOMY      TUBAL ABDOMINAL LIGATION         Family History: Her family history includes Alcohol abuse in her brother and sister; Breast cancer in her paternal aunt; COPD in her mother; Cancer in her brother, father, mother, and paternal grandfather; Colon cancer in her brother and father; Diabetes in her father and mother; Drug abuse in her brother; Heart attack in her father; Heart disease in her father and mother; Heart failure in her mother; Hyperlipidemia in her father; Hypertension in her father and mother; Lung cancer in her mother; Stroke in her father.     Social History: She  reports that she has been smoking cigarettes. She has a 35 pack-year smoking history. She has been exposed to tobacco smoke. She has never used smokeless tobacco. She reports that she does not drink alcohol and does not use drugs.    Home Medications:  OneTouch Delica Plus [id number], OneTouch Verio Flex System, Semaglutide(0.25 or 0.5MG/DOS), Tiotropium Bromide Monohydrate, albuterol,  albuterol sulfate HFA, atorvastatin, azelastine, budesonide-formoterol, celecoxib, chlorthalidone, clobetasol, ergocalciferol, glucose blood, metoprolol succinate XL, nystatin, ondansetron ODT, oxybutynin XL, and sucralfate    Allergies:  She is allergic to metformin.       Vital Signs:   Temp:  [97.7 °F (36.5 °C)] 97.7 °F (36.5 °C)  Heart Rate:  [89] 89  BP: (126)/(85) 126/85  ENT Physical Exam  Constitutional  Appearance: patient appears well-developed, well-nourished and well-groomed,  Communication/Voice: communication appropriate for developmental age; vocal quality normal;  Head and Face  Appearance: head appears normal, face appears normal and face appears atraumatic;  Palpation: facial palpation normal;  Salivary: glands normal;  Ear  Hearing: intact;  Auricles: right auricle normal; left auricle normal;  External Mastoids: right external mastoid normal; left external mastoid normal;  Nose  External Nose: nasal deformity visible; rightward deviation noted;  Internal Nose: bilateral intranasal mucosa edematous; nasal septal deviation present; bilateral inferior turbinates with hypertrophy;  Nose comments: Nasal valve collapse, improved with blair maneuver  Oral Cavity/Oropharynx  Lips: normal;  Teeth: normal;  Gums: gingiva normal;  Tongue: normal;  Oral mucosa: normal;  Hard palate: normal;  Neck  Neck: neck normal;  Respiratory  Inspection: breathing unlabored;  Cardiovascular  Inspection: extremities are warm and well perfused;  Lymphatic  Palpation: lymph nodes normal;       Physical Exam  Nasal examination reveals a nasal valve collapse, a deviated septum, and inferior turbinate hypertrophy.               Assessment & Plan  Deviated nasal septum    Hypertrophy of inferior nasal turbinate    Nasal valve collapse       Assessment & Plan  1. Allergic rhinitis.  She has been utilizing appropriate nasal sprays, including Astelin and Flonase, which should have alleviated her symptoms if they were to respond  to medical management. Examination reveals nasal valve collapse, a deviated septum, and inferior turbinate hypertrophy. A CT scan of the sinuses will be ordered to further evaluate her condition. She will be contacted within a week to schedule this procedure. A consultation with Dr. Lund is planned for approximately 6 weeks from now to discuss potential surgical intervention.     Orders Placed This Encounter   Procedures    CT Sinus Without Contrast         Continue nasal sprays as previously prescribed.  We will add medications and get a CT sinus after completion of treatment to asess the sinuses.  Return in about 6 weeks (around 3/31/2025) for Follow up with Dr. Navarrete, with CT sinus.        Electronically signed by AMY Dunn 02/17/25 2:27 PM CST.     Patient or patient representative verbalized consent for the use of Ambient Listening during the visit with  AMY Dunn for chart documentation. 2/17/2025  14:27 CST

## 2025-02-17 NOTE — PROGRESS NOTES
February 17, 2025    Hello, may I speak with Anastasiia Ramiro?    My name is lico      I am  with [facility] EAR NOSE & THRO[facility]  [address], [address]  [facility] [address] [phone].    Before we get started may I verify your date of birth? 1970    I am calling to officially welcome you to our practice and ask about your recent visit. Is this a good time to talk? yes    Tell me about your visit with us. What things went well?  it was a good visit       We're always looking for ways to make our patients' experiences even better. Do you have recommendations on ways we may improve?  no    Overall were you satisfied with your first visit to our practice? yes       I appreciate you taking the time to speak with me today. Is there anything else I can do for you? no      Thank you, and have a great day.

## 2025-02-18 RX ORDER — NICOTINE 21 MG/24HR
1 PATCH, TRANSDERMAL 24 HOURS TRANSDERMAL EVERY 24 HOURS
Qty: 60 EACH | Refills: 0 | Status: SHIPPED | OUTPATIENT
Start: 2025-02-18

## 2025-02-18 RX ORDER — SEMAGLUTIDE 0.68 MG/ML
INJECTION, SOLUTION SUBCUTANEOUS
Qty: 3 ML | Refills: 0 | Status: SHIPPED | OUTPATIENT
Start: 2025-02-18 | End: 2025-02-20

## 2025-02-20 ENCOUNTER — TELEPHONE (OUTPATIENT)
Dept: FAMILY MEDICINE CLINIC | Facility: CLINIC | Age: 55
End: 2025-02-20
Payer: COMMERCIAL

## 2025-02-20 RX ORDER — SEMAGLUTIDE 0.68 MG/ML
0.5 INJECTION, SOLUTION SUBCUTANEOUS WEEKLY
Qty: 3 ML | Refills: 0 | Status: SHIPPED | OUTPATIENT
Start: 2025-02-20 | End: 2025-03-20

## 2025-02-20 NOTE — TELEPHONE ENCOUNTER
Pt called to ask about lab results- advised that labs have not been resulted by the provider yet. Once they are a result note will appear in MyChart for review.   Pt reports that she thinks that her smoking is attributed to some of her lab levels being off. Wanted to know if Nani would send in an rx for nicotine patches. Explained that she has tried that tablets and they gave her terrible nightmares. Advised that patched were sent to pharmacy for her on 2/18/2025.pt verbalized understanding.

## 2025-02-23 DIAGNOSIS — R94.5 ABNORMAL LIVER FUNCTION: Primary | ICD-10-CM

## 2025-02-23 DIAGNOSIS — R89.9 ABNORMAL LABORATORY TEST: ICD-10-CM

## 2025-02-23 DIAGNOSIS — D58.2 ELEVATED HEMOGLOBIN: ICD-10-CM

## 2025-03-03 LAB
AMBRY INTERPRETATION REPORT: NORMAL
GENE DIS ANL INTERP-IMP: NORMAL

## 2025-03-04 ENCOUNTER — TELEMEDICINE (OUTPATIENT)
Dept: FAMILY MEDICINE CLINIC | Facility: CLINIC | Age: 55
End: 2025-03-04
Payer: COMMERCIAL

## 2025-03-04 DIAGNOSIS — J44.1 COPD WITH EXACERBATION: Primary | ICD-10-CM

## 2025-03-04 PROCEDURE — 1160F RVW MEDS BY RX/DR IN RCRD: CPT | Performed by: NURSE PRACTITIONER

## 2025-03-04 PROCEDURE — 3044F HG A1C LEVEL LT 7.0%: CPT | Performed by: NURSE PRACTITIONER

## 2025-03-04 PROCEDURE — 1159F MED LIST DOCD IN RCRD: CPT | Performed by: NURSE PRACTITIONER

## 2025-03-04 PROCEDURE — 99214 OFFICE O/P EST MOD 30 MIN: CPT | Performed by: NURSE PRACTITIONER

## 2025-03-04 PROCEDURE — 1126F AMNT PAIN NOTED NONE PRSNT: CPT | Performed by: NURSE PRACTITIONER

## 2025-03-04 RX ORDER — LEVOFLOXACIN 500 MG/1
500 TABLET, FILM COATED ORAL DAILY
Qty: 7 TABLET | Refills: 0 | Status: SHIPPED | OUTPATIENT
Start: 2025-03-04 | End: 2025-03-11

## 2025-03-04 RX ORDER — PREDNISONE 20 MG/1
20 TABLET ORAL DAILY
Qty: 7 TABLET | Refills: 0 | Status: SHIPPED | OUTPATIENT
Start: 2025-03-04 | End: 2025-03-11

## 2025-03-04 RX ORDER — DEXTROMETHORPHAN HYDROBROMIDE AND PROMETHAZINE HYDROCHLORIDE 15; 6.25 MG/5ML; MG/5ML
5 SYRUP ORAL 4 TIMES DAILY PRN
Qty: 120 ML | Refills: 0 | Status: SHIPPED | OUTPATIENT
Start: 2025-03-04

## 2025-03-04 NOTE — PROGRESS NOTES
Chief Complaint  Cough    Subjective        Anastasiia Rubio presents to [facility]  History of Present Illness  History of Present Illness  The patient is a 55-year-old female who presents via virtual visit for evaluation of cough, fever, and pain. This was an audio and video enabled telemedicine encounter. She is in her home and I am @ [facility]     She has been experiencing a severe cough, which has become painful. She reports difficulty in expectorating and experiences pain during inhalation. She has been using her nebulizer every 4 to 6 hours, including during the night when she wakes up coughing. This has improved her ability to expectorate. She has been using a binder on her abdomen due to the pain caused by coughing. She has not eaten in the past few days but believes she is adequately hydrated. She reports feeling better than the previous day. She experienced shortness of breath yesterday, but this has improved with the use of her nebulizer. She has not been monitoring her blood glucose levels for the past few days. She has been using her nebulizer every 4 to 6 hours, including during the night when she wakes up coughing. This has improved her ability to expectorate. She has been taking Tylenol, Motrin, and liquid Mucinex for throat discomfort due to coughing. She has been using a binder on her abdomen due to the pain caused by coughing.    Two days ago, she developed a fever, initially mild at 100 degrees, but escalating to 103.4 degrees that night. Despite taking Tylenol and Motrin, she has been unable to reduce her temperature below 100 degrees. This morning, her temperature was 101.7 degrees. She has been bedridden for the past few days due to generalized body aches. Her son noted a purplish hue to her lips yesterday. She reports that her nephew, who resides with her, has been ill for approximately a week and tested negative for COVID-19, influenza, and strep throat.  "She rates her pain level as 7 out of 10. Despite taking Tylenol and Motrin, she has been unable to reduce her temperature below 100 degrees.    SOCIAL HISTORY  She does smoke, but she said that it has been several days since she smoked.    MEDICATIONS  Current: Tylenol, Motrin, Mucinex, albuterol, Symbicort, atorvastatin, metoprolol, oxybutynin, semaglutide    The following portions of the patient's history were reviewed and updated as appropriate: allergies, current medications, past family history, past medical history, past social history, past surgical history and problem list.    Objective   Vital Signs:  There were no vitals taken for this visit.  Estimated body mass index is 43.94 kg/m² as calculated from the following:    Height as of 2/17/25: 160 cm (62.99\").    Weight as of 2/17/25: 112 kg (248 lb).       Class 3 Severe Obesity (BMI >=40). Obesity-related health conditions include the following: hypertension, diabetes mellitus, dyslipidemias, and GERD. Obesity is unchanged. BMI is is above average; BMI management plan is completed. We discussed portion control and increasing exercise.        Physical Exam   Constitutional: She is oriented to person, place, and time. She appears well-developed and well-nourished. She appears ill.   HENT:   Head: Normocephalic and atraumatic.   Right Ear: Hearing normal.   Left Ear: Hearing normal.   Nose: Congestion present. Right sinus exhibits maxillary sinus tenderness and frontal sinus tenderness. Left sinus exhibits maxillary sinus tenderness and frontal sinus tenderness.   Mouth/Throat: Mouth/Lips are normal.Oropharynx is clear and moist.   Lymphadenopathy:        Head (right side): No submental and no submandibular adenopathy present.        Head (left side): No submental and no submandibular adenopathy present.        Right cervical: No anterior cervical adenopathy present.       Left cervical: No anterior cervical adenopathy present.   Neurological: She is alert " and oriented to person, place, and time. She has normal strength.   Skin: Skin is warm and intact. Turgor is normal. Her skin appears normal.  Psychiatric: She has a normal mood and affect. Her speech is normal and behavior is normal. Judgment normal.   Vitals reviewed.  Pt coughed through the whole visit and complained that her ribs hurt from all the coughing     Physical Exam      Result Review :          Results             Assessment and Plan     Diagnoses and all orders for this visit:    1. COPD with exacerbation (Primary)  -     XR Chest PA & Lateral; Future  -     promethazine-dextromethorphan (PROMETHAZINE-DM) 6.25-15 MG/5ML syrup; Take 5 mL by mouth 4 (Four) Times a Day As Needed for Cough.  Dispense: 120 mL; Refill: 0  -     levoFLOXacin (LEVAQUIN) 500 MG tablet; Take 1 tablet by mouth Daily for 7 days.  Dispense: 7 tablet; Refill: 0  -     predniSONE (DELTASONE) 20 MG tablet; Take 1 tablet by mouth Daily for 7 days.  Dispense: 7 tablet; Refill: 0  -     Home Nebulizer    Pt lives in [place] and doesn't feel like coming into office.  I told her that I put an order in for the cxr and if she gets worse, fever increases come in for cxr  Assessment & Plan  1. Cough.  Her cough has shown slight improvement over the past 7 days, but remains frequent and severe enough to cause chest discomfort and productive sputum. She also reports fever, chills, body aches, nasal congestion, and sore throat. Shortness of breath was initially present but has improved with nebulizer use. Symptoms worsen when lying down. Despite being a smoker, she has abstained for several days. Over-the-counter treatments have been attempted with minimal success, although her fever has decreased. Given her history of COPD, this could potentially be a flare-up. Family members have tested negative for COVID-19, influenza, and strep. A chest x-ray has been ordered for future use if her condition deteriorates. A prescription for promethazine  DM cough syrup, 5 mL four times daily, has been provided. She is advised not to drive after taking this medication due to its sedative effects. A prescription for levofloxacin, one tablet daily for 7 days, has also been given. She is instructed to continue her nebulizer treatments and albuterol inhaler as needed for shortness of breath. She is also advised to continue using Symbicort as her maintenance inhaler. A prescription for a new nebulizer has been provided.    2. Fever.  She has been experiencing a fever with temperatures reaching up to 103.4°F. Despite taking Tylenol and Motrin, her temperature has not gone below 100°F. She is advised to continue taking Tylenol and Motrin as needed for fever management. If her fever persists or worsens, she should come in for further evaluation.    3. Pain.  She reports significant body aches and chest pain due to coughing, rating her pain level at a 7. She is advised to continue using her binder to alleviate chest pain from coughing. Over-the-counter pain relievers such as Tylenol and Motrin can be used as needed.    4. Chronic obstructive pulmonary disease (COPD).  This could be a flare. Family members had been tested for the COVID-19, the flu, and strep and they do not have any. She is advised to continue using her nebulizer solution, which is albuterol, and her Symbicort maintenance inhaler. A prescription for levofloxacin, one tablet daily for 7 days, has been given to address any potential infection. A prescription for prednisone, one tablet daily for 7 days, has also been provided to reduce inflammation.    5. Hyperlipidemia.  She is advised to continue her atorvastatin (Lipitor) as prescribed.    6. Hypertension and irregular heartbeat.  She is advised to continue her metoprolol as prescribed.    7. Overactive bladder.  She is advised to continue her oxybutynin as prescribed.    8. Diabetes.  She is advised to continue her semaglutide as prescribed. She should resume  checking her blood sugar levels daily once her symptoms improve.      ICD-10-CM ICD-9-CM   1. COPD with exacerbation  J44.1 491.21                Follow Up     Return in about 1 week (around 3/11/2025), or if symptoms worsen or fail to improve.  Patient was given instructions and counseling regarding her condition or for health maintenance advice. Please see specific information pulled into the AVS if appropriate.       Patient or patient representative verbalized consent for the use of Ambient Listening during the visit with  Nani Adkins DNP, AMY for chart     Electronically signed by Nani Adkins DNP, AMY, 03/04/25, 9:17 AM CST.  documentation. 3/4/2025  09:13 CST

## 2025-03-12 ENCOUNTER — TELEPHONE (OUTPATIENT)
Dept: VASCULAR SURGERY | Facility: CLINIC | Age: 55
End: 2025-03-12
Payer: COMMERCIAL

## 2025-03-13 ENCOUNTER — OFFICE VISIT (OUTPATIENT)
Dept: VASCULAR SURGERY | Facility: CLINIC | Age: 55
End: 2025-03-13
Payer: COMMERCIAL

## 2025-03-13 ENCOUNTER — HOSPITAL ENCOUNTER (OUTPATIENT)
Dept: ULTRASOUND IMAGING | Facility: HOSPITAL | Age: 55
Discharge: HOME | End: 2025-03-13
Admitting: NURSE PRACTITIONER
Payer: COMMERCIAL

## 2025-03-13 ENCOUNTER — HOSPITAL ENCOUNTER (OUTPATIENT)
Dept: ULTRASOUND IMAGING | Facility: HOSPITAL | Age: 55
Discharge: HOME | End: 2025-03-13
Payer: COMMERCIAL

## 2025-03-13 ENCOUNTER — TELEPHONE (OUTPATIENT)
Dept: UROLOGY | Facility: CLINIC | Age: 55
End: 2025-03-13
Payer: COMMERCIAL

## 2025-03-13 VITALS
HEIGHT: 63 IN | WEIGHT: 240 LBS | HEART RATE: 51 BPM | OXYGEN SATURATION: 98 % | DIASTOLIC BLOOD PRESSURE: 76 MMHG | SYSTOLIC BLOOD PRESSURE: 142 MMHG | BODY MASS INDEX: 42.52 KG/M2

## 2025-03-13 DIAGNOSIS — Z72.0 TOBACCO ABUSE: ICD-10-CM

## 2025-03-13 DIAGNOSIS — I73.9 PAD (PERIPHERAL ARTERY DISEASE): ICD-10-CM

## 2025-03-13 DIAGNOSIS — I65.23 BILATERAL CAROTID ARTERY STENOSIS: ICD-10-CM

## 2025-03-13 DIAGNOSIS — E66.01 CLASS 3 SEVERE OBESITY DUE TO EXCESS CALORIES WITH BODY MASS INDEX (BMI) OF 40.0 TO 44.9 IN ADULT, UNSPECIFIED WHETHER SERIOUS COMORBIDITY PRESENT: ICD-10-CM

## 2025-03-13 DIAGNOSIS — E11.69 TYPE 2 DIABETES MELLITUS WITH OTHER SPECIFIED COMPLICATION, WITHOUT LONG-TERM CURRENT USE OF INSULIN: ICD-10-CM

## 2025-03-13 DIAGNOSIS — I65.23 BILATERAL CAROTID ARTERY STENOSIS: Primary | ICD-10-CM

## 2025-03-13 DIAGNOSIS — E66.813 CLASS 3 SEVERE OBESITY DUE TO EXCESS CALORIES WITH BODY MASS INDEX (BMI) OF 40.0 TO 44.9 IN ADULT, UNSPECIFIED WHETHER SERIOUS COMORBIDITY PRESENT: ICD-10-CM

## 2025-03-13 DIAGNOSIS — N39.3 STRESS INCONTINENCE (FEMALE) (MALE): Primary | ICD-10-CM

## 2025-03-13 DIAGNOSIS — I10 PRIMARY HYPERTENSION: ICD-10-CM

## 2025-03-13 PROCEDURE — 93880 EXTRACRANIAL BILAT STUDY: CPT

## 2025-03-13 PROCEDURE — 93923 UPR/LXTR ART STDY 3+ LVLS: CPT

## 2025-03-13 RX ORDER — SOLIFENACIN SUCCINATE 10 MG/1
10 TABLET, FILM COATED ORAL DAILY
Qty: 30 TABLET | Refills: 2 | Status: SHIPPED | OUTPATIENT
Start: 2025-03-13

## 2025-03-13 NOTE — PROGRESS NOTES
"3/14/2025        Nani Adkins DNP, APRN  [address] [address]      Anastasiia Rubio  1970    Chief Complaint   Patient presents with    Follow-up     1 year follow up w/ testing last seen 11/14/23. Patient denies any issues with legs.  Has cut down and smoking and can tell a difference in legs.        Dear Nani Adkins DNP, APRN      HPI  I had the pleasure of seeing your patient Anastasiia Rubio in the office today.  As you recall, Anastasiia Rubio is a 55 y.o.  female who you are currently following for routine health maintenance.  She states that her legs have improved since cutting back on smoking.  She denies symptoms of claudication or ischemia pain.  She denies any strokelike symptoms.  She is maintained on Lipitor.  She did have noninvasive testing performed, which I did review in office.      Review of Systems   Constitutional: Negative.  Negative for diaphoresis and fever.   HENT: Negative.     Eyes: Negative.    Respiratory: Negative.  Negative for shortness of breath and wheezing.    Cardiovascular: Negative.  Negative for leg swelling.   Gastrointestinal: Negative.  Negative for abdominal pain.   Endocrine: Negative.    Genitourinary: Negative.    Musculoskeletal: Negative.  Negative for gait problem.   Skin: Negative.    Allergic/Immunologic: Negative.    Neurological: Negative.  Negative for dizziness and numbness.   Hematological: Negative.    Psychiatric/Behavioral: Negative.       /76   Pulse 51   Ht 160 cm (63\")   Wt 109 kg (240 lb)   LMP  (LMP Unknown) Comment: lmp January  SpO2 98%   BMI 42.51 kg/m²     Physical Exam  Vitals and nursing note reviewed.   Constitutional:       General: She is not in acute distress.     Appearance: Normal appearance. She is morbidly obese. She is not diaphoretic.   HENT:      Head: Normocephalic. No right periorbital erythema or left periorbital erythema.      Nose: Nose normal.   Eyes:      General: No scleral icterus.     " Pupils: Pupils are equal.   Cardiovascular:      Rate and Rhythm: Normal rate and regular rhythm.      Pulses: Normal pulses.           Dorsalis pedis pulses are 2+ on the right side and 2+ on the left side.        Posterior tibial pulses are 2+ on the right side and 2+ on the left side.      Heart sounds: Normal heart sounds. No murmur heard.  Pulmonary:      Effort: Pulmonary effort is normal. No respiratory distress.      Breath sounds: Normal breath sounds.   Abdominal:      General: Bowel sounds are normal. There is no distension.      Palpations: Abdomen is soft.      Tenderness: There is no abdominal tenderness. There is no guarding.   Musculoskeletal:         General: No swelling or tenderness. Normal range of motion.      Cervical back: Normal range of motion and neck supple.      Right lower leg: No edema.      Left lower leg: No edema.   Feet:      Right foot:      Skin integrity: Skin integrity normal.      Left foot:      Skin integrity: Skin integrity normal.   Skin:     General: Skin is warm and dry.      Findings: No erythema or rash.   Neurological:      General: No focal deficit present.      Mental Status: She is alert and oriented to person, place, and time. Mental status is at baseline.      Cranial Nerves: No cranial nerve deficit.      Gait: Gait normal.   Psychiatric:         Attention and Perception: Attention normal.         Mood and Affect: Mood normal.         Behavior: Behavior normal.         Thought Content: Thought content normal.         Judgment: Judgment normal.     DIAGNOSTIC DATA  Narrative & Impression   History: Carotid occlusive disease     IMPRESSION:  Impression:  1. There is less than 50% stenosis of the right internal carotid artery.  2. There is less than 50% stenosis of the left internal carotid artery.  3. Antegrade flow is demonstrated in bilateral vertebral arteries.     Comments: Bilateral carotid vertebral arterial duplex scan was  performed.     Grayscale imaging  shows intimal thickening and calcified elements at the  carotid bifurcation. The right internal carotid artery peak systolic  velocity is 97.9 cm/sec. The end-diastolic velocity is 39.4 cm/sec. The  right ICA/CCA ratio is approximately 1.2. These findings correlate with  less than 50% stenosis of the right internal carotid artery.     Grayscale imaging shows intimal thickening and calcified elements at the  carotid bifurcation. The left internal carotid artery peak systolic  velocity is 90.4 cm/sec. The end-diastolic velocity is 26.5 cm/sec. The  left ICA/CCA ratio is approximately 1.0. These findings correlate with  less than 50% stenosis of the left internal carotid artery.     Antegrade flow is demonstrated in bilateral vertebral arteries.        This report was signed and finalized on 3/13/2025 4:14 PM by Dr. Wing Sánchez MD.     Narrative & Impression      History: PAD     Comments: Bilateral lower extremity arterial with multi-level pulse  volume recordings and segmental pressures were performed at rest and  stress.     The right ankle/brachial index is 1.53. The waveforms are triphasic  without dampening. These findings are consistent with no significant  arterial insufficiency of the right lower extremity at rest.     The left ankle/brachial index is 1.41. The waveforms are triphasic  without dampening. These findings are consistent with no significant  arterial insufficiency of the left lower extremity at rest.     IMPRESSION:  Impression:  1. No significant arterial insufficiency of the right lower extremity at  rest.  2. No significant arterial insufficiency of the left lower extremity at  rest.           This report was signed and finalized on 3/13/2025 3:58 PM by Dr. Wing Sánchez MD.        Patient Active Problem List   Diagnosis    Amenorrhea    Actinic keratitis    Asthma    COPD (chronic obstructive pulmonary disease)    Depression    Diabetes mellitus, type 2    Elevated hemoglobin     Erythrocytosis    ET (eustachian tube disorder)    Hearing loss    GERD (gastroesophageal reflux disease)    History of 2019 novel coronavirus disease (COVID-19)    Hypertension    Hypothyroidism    Leg edema    Leukocytosis    Low HDL (under 40)    Seborrheic keratosis    Stress incontinence, female    Tobacco abuse    Tobacco dependence    Transaminitis    Upper abdominal pain    Inappropriate sinus tachycardia    Supplemental oxygen dependent    Sleep apnea    Hyperlipidemia    RABAGO (dyspnea on exertion)    Wheezing    Reactive airway disease    Sleep related hypoxia    Cigarette nicotine dependence without complication    Constipation    Deviated nasal septum    Hypertrophy of inferior nasal turbinate    Nasal valve collapse        Diagnosis Plan   1. Bilateral carotid artery stenosis  US Carotid Bilateral      2. PAD (peripheral artery disease)  US Ankle / Brachial Indices Extremity Complete      3. Primary hypertension  Ambulatory Referral to Cardiology      4. Type 2 diabetes mellitus with other specified complication, without long-term current use of insulin        5. Tobacco abuse        6. Class 3 severe obesity due to excess calories with body mass index (BMI) of 40.0 to 44.9 in adult, unspecified whether serious comorbidity present              Plan: After thoroughly evaluating Anastasiia Rubio, I believe the best course of action is to remain conservative from vascular surgery standpoint.  Overall she is doing okay, she states she does get fatigued often and she relates that possibly to her metoprolol.  She states she used to see Dr. Rios and she was wondering who she should see since he retired because she would really like to wean down off of that medication.  I told her that AMY Mack was his nurse practitioner and that I would put in a referral for her to see her to discuss further.  We will see her back in 1 year with noninvasive test to include ABIs and carotid duplex for continued  surveillance.  She should continue her Lipitor 40 mg nightly in addition to her other medications.  I did discuss vascular risk factors as they pertain to the progression of vascular disease including controlling her hypertension, hyperlipidemia, diabetes and smoking cessation.  Her blood pressure is slightly elevated today in office at 142/76, heart rate is decreased to 51 which may be the reason she is fatigued.  Her lipid panel from 3 weeks ago shows all values within normal limits.  Her hemoglobin A1c is controlled at 6.1%.  Unfortunately she is a daily smoker, although she has decreased her nicotine use, she has no desire to quit entirely at this time. Body mass index is 42.51 kg/m².        This was all discussed in full with complete understanding.    Thank you for allowing me to participate in the care of your patient.  Please do not hesitate to call with any questions or concerns.  We will keep you aware of any further encounters with Anastasiia Rubio.        Sincerely yours,         Alexa Garcia, AMY Adkins, Nani Crabtree, CHANDU, APRN

## 2025-03-13 NOTE — TELEPHONE ENCOUNTER
Pt called in stating that the oxybutynin is causing diarrhea. She is requesting another OAB medication. Pt has only had oxybutynin.

## 2025-03-25 ENCOUNTER — OFFICE VISIT (OUTPATIENT)
Dept: CARDIOLOGY | Facility: CLINIC | Age: 55
End: 2025-03-25
Payer: COMMERCIAL

## 2025-03-25 VITALS
RESPIRATION RATE: 18 BRPM | HEIGHT: 63 IN | WEIGHT: 247 LBS | BODY MASS INDEX: 43.77 KG/M2 | OXYGEN SATURATION: 95 % | DIASTOLIC BLOOD PRESSURE: 85 MMHG | SYSTOLIC BLOOD PRESSURE: 140 MMHG | HEART RATE: 87 BPM

## 2025-03-25 DIAGNOSIS — I10 PRIMARY HYPERTENSION: ICD-10-CM

## 2025-03-25 DIAGNOSIS — R00.0 SINUS TACHYCARDIA: ICD-10-CM

## 2025-03-25 DIAGNOSIS — R07.89 OTHER CHEST PAIN: Primary | ICD-10-CM

## 2025-03-25 PROCEDURE — 1160F RVW MEDS BY RX/DR IN RCRD: CPT | Performed by: NURSE PRACTITIONER

## 2025-03-25 PROCEDURE — 3077F SYST BP >= 140 MM HG: CPT | Performed by: NURSE PRACTITIONER

## 2025-03-25 PROCEDURE — 99214 OFFICE O/P EST MOD 30 MIN: CPT | Performed by: NURSE PRACTITIONER

## 2025-03-25 PROCEDURE — 93000 ELECTROCARDIOGRAM COMPLETE: CPT | Performed by: NURSE PRACTITIONER

## 2025-03-25 PROCEDURE — 3079F DIAST BP 80-89 MM HG: CPT | Performed by: NURSE PRACTITIONER

## 2025-03-25 PROCEDURE — 1159F MED LIST DOCD IN RCRD: CPT | Performed by: NURSE PRACTITIONER

## 2025-03-25 NOTE — PROGRESS NOTES
Subjective:     Encounter Date:03/25/2025      Patient ID: Anastasiia Rubio is a 55 y.o. female     Chief Complaint:  History of Present Illness  Patient presents today to reestablish care. Patient was seen last in 2023 by Dr. Rios for sinus tachycardia. She had already been started on Lopressor. Dr. Rios had switched Lopressor to Toprol. In review she was concerned about being on a beta blocker at that time. It appears she has been on once since 2020. She had Covid in 2020 and was acutely ill requiring hospitalization. Notes she was on oxygen until last year. She saw her vascular surgeon and PCP and asked them about Metoprolol- but were advised to see cardiology. She denies any significant symptoms. She does note she has occasional tightness around her chest when she is active. She notes she had this before she started the metoprolol but it went away but has come back in the last few months. She has chronic shortness of breath. She has hypertension- and in review her BP has been borderline high on last checks. She does not monitor at home. Patient has COPD, chronic respiratory failure but has been weaned off oxygen in the last 6 months, GERD, Obesity, Hypertension, Hyperlipidemia, Type II Diabetes.     The following portions of the patient's history were reviewed and updated as appropriate: allergies, current medications, past medical history, past social history, past and problem list.    Allergies   Allergen Reactions    Metformin Other (See Comments) and Myalgia     When taking metformin she feels run down, fatigued, and body aches.  Off it she improves       Current Outpatient Medications:     albuterol (PROVENTIL) (2.5 MG/3ML) 0.083% nebulizer solution, Take 2.5 mg by nebulization Every 4 (Four) Hours As Needed for Wheezing., Disp: 90 mL, Rfl: 1    albuterol sulfate HFA (ProAir HFA) 108 (90 Base) MCG/ACT inhaler, Inhale 2 puffs Every 4 (Four) Hours As Needed for Wheezing., Disp: 8 g, Rfl: 11    atorvastatin  (LIPITOR) 40 MG tablet, Take 1 tablet by mouth Every Night., Disp: 90 tablet, Rfl: 0    azelastine (ASTEPRO) 0.15 % solution nasal spray, 2 sprays into the nostril(s) as directed by provider 2 (Two) Times a Day., Disp: 30 mL, Rfl: 2    Blood Glucose Monitoring Suppl (OneTouch Verio Flex System) w/Device kit, Test blood glucose level 1-2 times per day DX:E11.9, Disp: , Rfl:     budesonide-formoterol (SYMBICORT) 80-4.5 MCG/ACT inhaler, Inhale 2 puffs Every 6 (Six) Hours., Disp: 10.2 g, Rfl: 5    celecoxib (CeleBREX) 100 MG capsule, Take 1 capsule by mouth 2 (Two) Times a Day As Needed for Mild Pain., Disp: 180 capsule, Rfl: 1    chlorthalidone (HYGROTON) 25 MG tablet, Take 1 tablet by mouth Daily., Disp: 90 tablet, Rfl: 0    clobetasol (TEMOVATE) 0.05 % external solution, Apply  topically to the appropriate area as directed 2 (Two) Times a Day., Disp: 50 mL, Rfl: 1    ergocalciferol (ERGOCALCIFEROL) 1.25 MG (63739 UT) capsule, Take 1 capsule by mouth 2 (Two) Times a Week., Disp: 12 capsule, Rfl: 5    Lancets (OneTouch Delica Plus [id number]) misc, 1 each by Other route Daily., Disp: 100 each, Rfl: 5    metoprolol succinate XL (TOPROL-XL) 50 MG 24 hr tablet, Take 1 tablet by mouth Daily., Disp: 90 tablet, Rfl: 1    nicotine (Nicoderm CQ) 21 MG/24HR patch, Place 1 patch on the skin as directed by provider Daily., Disp: 60 each, Rfl: 0    nystatin (MYCOSTATIN) 268652 UNIT/GM powder, Apply 1 gm to the affected skin area twice daily, Disp: 60 g, Rfl: 0    ondansetron ODT (ZOFRAN-ODT) 4 MG disintegrating tablet, Place 1 tablet on the tongue Every 8 (Eight) Hours As Needed for Nausea or Vomiting., Disp: 20 tablet, Rfl: 1    OneTouch Verio test strip, Check FBS daily and prn for Diabetes, Disp: 100 each, Rfl: 5    promethazine-dextromethorphan (PROMETHAZINE-DM) 6.25-15 MG/5ML syrup, Take 5 mL by mouth 4 (Four) Times a Day As Needed for Cough., Disp: 120 mL, Rfl: 0    solifenacin (VESICARE) 10 MG tablet, Take 1 tablet by mouth  Daily., Disp: 30 tablet, Rfl: 2    Spiriva Respimat 1.25 MCG/ACT aerosol solution inhaler, Inhale 2 puffs Daily., Disp: 4 g, Rfl: 2    sucralfate (Carafate) 1 g tablet, Take 1 tablet by mouth 4 (Four) Times a Day., Disp: 90 tablet, Rfl: 1    Social History     Socioeconomic History    Marital status:    Tobacco Use    Smoking status: Every Day     Current packs/day: 1.00     Average packs/day: 1 pack/day for 35.0 years (35.0 ttl pk-yrs)     Types: Cigarettes     Passive exposure: Past    Smokeless tobacco: Never   Vaping Use    Vaping status: Never Used   Substance and Sexual Activity    Alcohol use: Never    Drug use: Never    Sexual activity: Not Currently     Partners: Male     Birth control/protection: None     Comment: I do not use any form of birth control       Review of Systems   Constitutional: Positive for malaise/fatigue. Negative for chills, decreased appetite, fever, weight gain and weight loss.   HENT:  Negative for nosebleeds.    Eyes:  Negative for visual disturbance.   Cardiovascular:  Positive for chest pain. Negative for dyspnea on exertion, leg swelling, near-syncope, orthopnea, palpitations, paroxysmal nocturnal dyspnea and syncope.   Respiratory:  Positive for shortness of breath. Negative for cough, hemoptysis and snoring.    Endocrine: Negative for cold intolerance and heat intolerance.   Hematologic/Lymphatic: Negative for bleeding problem. Does not bruise/bleed easily.   Skin:  Negative for rash.   Musculoskeletal:  Negative for back pain and falls.   Gastrointestinal:  Negative for abdominal pain, constipation, diarrhea, heartburn, melena, nausea and vomiting.   Genitourinary:  Negative for hematuria.   Neurological:  Negative for dizziness, headaches and light-headedness.   Psychiatric/Behavioral:  Negative for altered mental status.    Allergic/Immunologic: Negative for persistent infections.              Objective:     Constitutional:       Appearance: Healthy appearance. Not in  "distress.   Eyes:      Pupils: Pupils are equal, round, and reactive to light.   HENT:      Head: Normocephalic and atraumatic.      Nose: Nose normal.    Mouth/Throat:      Dentition: Normal.   Pulmonary:      Effort: Pulmonary effort is normal.      Breath sounds: Normal breath sounds.   Chest:      Chest wall: Not tender to palpatation.   Cardiovascular:      PMI at left midclavicular line. Normal rate. Regular rhythm.      Murmurs: There is no murmur.   Pulses:     Intact distal pulses.   Edema:     Peripheral edema absent.   Abdominal:      General: Bowel sounds are normal.      Palpations: Abdomen is soft.   Musculoskeletal: Normal range of motion.      Cervical back: Normal range of motion. Skin:     General: Skin is warm and dry.   Neurological:      Mental Status: Alert, oriented to person, place, and time and oriented to person, place and time.   Psychiatric:         Attention and Perception: Attention normal.         Mood and Affect: Mood normal.             ECG 12 Lead    Date/Time: 3/25/2025 2:05 PM  Performed by: Ced Krause APRN    Authorized by: Ced Krause APRN  Comparison: compared with previous ECG from 10/2/2023  Similar to previous ECG  Rhythm: sinus rhythm        /85 (BP Location: Right arm, Patient Position: Sitting, Cuff Size: Adult)   Pulse 87   Resp 18   Ht 160 cm (63\")   Wt 112 kg (247 lb)   LMP  (LMP Unknown) Comment: lmp January  SpO2 95%   BMI 43.75 kg/m²   Lab Review:   I have reviewed       Lab Results   Component Value Date    CHLPL 162 02/17/2025    TRIG 138 02/17/2025    HDL 48 02/17/2025    LDL 90 02/17/2025         Assessment:          Diagnosis Plan   1. Other chest pain  Adult Stress Echo W/ Cont or Stress Agent if Necessary Per Protocol      2. Sinus tachycardia        3. Primary hypertension               Plan:       Chest Pain- exertional. Will check stress echo.  Sinus tachycardia- offered monitor today. No symptoms concerning for intolerance to beta " blocker. Offered holter monitor.   Hypertension- borderline high. Continue Toprol.          Follow up in 6 months

## 2025-03-27 ENCOUNTER — OFFICE VISIT (OUTPATIENT)
Dept: BARIATRICS/WEIGHT MGMT | Facility: CLINIC | Age: 55
End: 2025-03-27
Payer: COMMERCIAL

## 2025-03-27 ENCOUNTER — TELEPHONE (OUTPATIENT)
Dept: FAMILY MEDICINE CLINIC | Facility: CLINIC | Age: 55
End: 2025-03-27
Payer: COMMERCIAL

## 2025-03-27 ENCOUNTER — PATIENT ROUNDING (BHMG ONLY) (OUTPATIENT)
Dept: BARIATRICS/WEIGHT MGMT | Facility: CLINIC | Age: 55
End: 2025-03-27
Payer: COMMERCIAL

## 2025-03-27 ENCOUNTER — NUTRITION (OUTPATIENT)
Dept: BARIATRICS/WEIGHT MGMT | Facility: CLINIC | Age: 55
End: 2025-03-27
Payer: COMMERCIAL

## 2025-03-27 VITALS
TEMPERATURE: 98.7 F | OXYGEN SATURATION: 97 % | DIASTOLIC BLOOD PRESSURE: 66 MMHG | BODY MASS INDEX: 44 KG/M2 | HEIGHT: 63 IN | SYSTOLIC BLOOD PRESSURE: 113 MMHG | HEART RATE: 90 BPM | WEIGHT: 248.3 LBS

## 2025-03-27 DIAGNOSIS — G47.30 SLEEP APNEA, UNSPECIFIED TYPE: ICD-10-CM

## 2025-03-27 DIAGNOSIS — F17.210 CIGARETTE NICOTINE DEPENDENCE WITHOUT COMPLICATION: ICD-10-CM

## 2025-03-27 DIAGNOSIS — E66.01 CLASS 3 SEVERE OBESITY DUE TO EXCESS CALORIES WITH SERIOUS COMORBIDITY AND BODY MASS INDEX (BMI) OF 40.0 TO 44.9 IN ADULT: Primary | ICD-10-CM

## 2025-03-27 DIAGNOSIS — I10 PRIMARY HYPERTENSION: ICD-10-CM

## 2025-03-27 DIAGNOSIS — E11.69 TYPE 2 DIABETES MELLITUS WITH OTHER SPECIFIED COMPLICATION, WITHOUT LONG-TERM CURRENT USE OF INSULIN: ICD-10-CM

## 2025-03-27 DIAGNOSIS — K21.00 GASTROESOPHAGEAL REFLUX DISEASE WITH ESOPHAGITIS WITHOUT HEMORRHAGE: ICD-10-CM

## 2025-03-27 DIAGNOSIS — E66.813 CLASS 3 SEVERE OBESITY DUE TO EXCESS CALORIES WITH SERIOUS COMORBIDITY AND BODY MASS INDEX (BMI) OF 40.0 TO 44.9 IN ADULT: Primary | ICD-10-CM

## 2025-03-27 PROCEDURE — 3074F SYST BP LT 130 MM HG: CPT | Performed by: NURSE PRACTITIONER

## 2025-03-27 PROCEDURE — 3078F DIAST BP <80 MM HG: CPT | Performed by: NURSE PRACTITIONER

## 2025-03-27 PROCEDURE — 99214 OFFICE O/P EST MOD 30 MIN: CPT | Performed by: NURSE PRACTITIONER

## 2025-03-27 PROCEDURE — 99406 BEHAV CHNG SMOKING 3-10 MIN: CPT | Performed by: NURSE PRACTITIONER

## 2025-03-27 PROCEDURE — 1160F RVW MEDS BY RX/DR IN RCRD: CPT | Performed by: NURSE PRACTITIONER

## 2025-03-27 PROCEDURE — 1159F MED LIST DOCD IN RCRD: CPT | Performed by: NURSE PRACTITIONER

## 2025-03-27 RX ORDER — SEMAGLUTIDE 0.68 MG/ML
0.25 INJECTION, SOLUTION SUBCUTANEOUS WEEKLY
COMMUNITY
Start: 2025-03-25

## 2025-03-27 NOTE — PROGRESS NOTES
Patient Care Team:  Nani Adkins, DNP, APRN as PCP - General (Family Medicine)  Christopher Freeman MD as Consulting Physician (Gastroenterology)      Subjective     History of Present Illness  The patient is a 55-year-old female who presents for weight management.    She has been struggling with weight loss, despite her earnest efforts, she has not achieved significant success. She has experienced some weight gain, but nothing substantial. She has attempted various weight loss strategies, including the Atkins diet, fasting, calorie counting, and Weight Watchers. She initiated Ozempic therapy, not primarily for weight loss, but to manage her type 2 diabetes. While Ozempic has been effective in controlling her diabetes, it has not facilitated weight loss. She has multiple comorbidities, including diabetes, sleep apnea, hypertension, and high cholesterol. She has no history of hernia repair, Bazan's esophagus, or gastroparesis.    She continues to use nicotine, although at a reduced rate of 1 pack per day from her previous 3 packs. She is currently attempting to quit smoking using patches and other aids. She recently discontinued oxygen therapy 6 months ago after a 5-year dependence due to severe COVID-19 infection. She was hospitalized for 3 months and required oxygen support. She gradually weaned off daytime oxygen use and has been off oxygen completely for the past 3 months. She expresses concern about potential weight gain following smoking cessation.    SOCIAL HISTORY  The patient admits to smoking but has reduced from 3 packs a day to 1 pack a day and is trying to quit using patches.    MEDICATIONS  Current: Ozempic        History  Past Medical History:   Diagnosis Date    Arthritis     Colon polyp     COPD (chronic obstructive pulmonary disease)     Cough 09/10/2013    COVID-19     CPAP (continuous positive airway pressure) dependence     Diabetes mellitus     GERD (gastroesophageal reflux disease)      Hyperlipidemia     Hypertension     Hypothyroidism 11/18/2013    Obesity     Sleep apnea     cpap with o2      Past Surgical History:   Procedure Laterality Date    AUGMENTATION MAMMAPLASTY      COLONOSCOPY N/A 05/30/2023    Procedure: COLONOSCOPY WITH ANESTHESIA;  Surgeon: Christopher Freeman MD;  Location: [facility] ENDOSCOPY;  Service: Gastroenterology;  Laterality: N/A;  preop; epigastric pain   postop polyp  PCP Nani denis    CYSTOSCOPY W/ BULKING AGENT INJECTION N/A 10/09/2023    Procedure: CYSTOSCOPY WITH BULKING AGENT INJECTION;  Surgeon: Octaviano Wall MD;  Location: [facility] OR;  Service: Urology;  Laterality: N/A;    ENDOSCOPY N/A 05/30/2023    Procedure: ESOPHAGOGASTRODUODENOSCOPY WITH ANESTHESIA;  Surgeon: Christopher Freeman MD;  Location: [facility] ENDOSCOPY;  Service: Gastroenterology;  Laterality: N/A;  preop; epigastric pain  postop normal  PCP Nani denis    LAPAROSCOPIC CHOLECYSTECTOMY      TUBAL ABDOMINAL LIGATION        Social History     Socioeconomic History    Marital status:    Tobacco Use    Smoking status: Every Day     Current packs/day: 1.00     Average packs/day: 1 pack/day for 35.0 years (35.0 ttl pk-yrs)     Types: Cigarettes     Passive exposure: Past    Smokeless tobacco: Never   Vaping Use    Vaping status: Never Used   Substance and Sexual Activity    Alcohol use: Never    Drug use: Never    Sexual activity: Not Currently     Partners: Male     Birth control/protection: None     Comment: I do not use any form of birth control      Family History   Problem Relation Age of Onset    Heart failure Mother     Diabetes Mother         2000    Lung cancer Mother     Heart disease Mother     Cancer Mother         Lung    COPD Mother     Hypertension Mother     Heart attack Father     Heart disease Father     Diabetes Father         2010    Colon cancer Father     Cancer Father         Colon    Hyperlipidemia Father     Stroke Father     Hypertension Father     Alcohol abuse Sister      Colon cancer Brother     Cancer Brother         Colon    Alcohol abuse Brother     Drug abuse Brother         1998    Breast cancer Paternal Aunt     Cancer Paternal Grandfather         Lung Cancer      Allergies   Allergen Reactions    Metformin Other (See Comments) and Myalgia     When taking metformin she feels run down, fatigued, and body aches.  Off it she improves          Current Outpatient Medications:     albuterol (PROVENTIL) (2.5 MG/3ML) 0.083% nebulizer solution, Take 2.5 mg by nebulization Every 4 (Four) Hours As Needed for Wheezing., Disp: 90 mL, Rfl: 1    albuterol sulfate HFA (ProAir HFA) 108 (90 Base) MCG/ACT inhaler, Inhale 2 puffs Every 4 (Four) Hours As Needed for Wheezing., Disp: 8 g, Rfl: 11    atorvastatin (LIPITOR) 40 MG tablet, Take 1 tablet by mouth Every Night., Disp: 90 tablet, Rfl: 0    Blood Glucose Monitoring Suppl (OneTouch Verio Flex System) w/Device kit, Test blood glucose level 1-2 times per day DX:E11.9, Disp: , Rfl:     celecoxib (CeleBREX) 100 MG capsule, Take 1 capsule by mouth 2 (Two) Times a Day As Needed for Mild Pain., Disp: 180 capsule, Rfl: 1    chlorthalidone (HYGROTON) 25 MG tablet, Take 1 tablet by mouth Daily., Disp: 90 tablet, Rfl: 0    ergocalciferol (ERGOCALCIFEROL) 1.25 MG (76487 UT) capsule, Take 1 capsule by mouth 2 (Two) Times a Week., Disp: 12 capsule, Rfl: 5    Lancets (OneTouch Delica Plus [id number]) misc, 1 each by Other route Daily., Disp: 100 each, Rfl: 5    metoprolol succinate XL (TOPROL-XL) 50 MG 24 hr tablet, Take 1 tablet by mouth Daily., Disp: 90 tablet, Rfl: 1    nicotine (Nicoderm CQ) 21 MG/24HR patch, Place 1 patch on the skin as directed by provider Daily., Disp: 60 each, Rfl: 0    ondansetron ODT (ZOFRAN-ODT) 4 MG disintegrating tablet, Place 1 tablet on the tongue Every 8 (Eight) Hours As Needed for Nausea or Vomiting., Disp: 20 tablet, Rfl: 1    OneTouch Verio test strip, Check FBS daily and prn for Diabetes, Disp: 100 each, Rfl: 5     Ozempic, 0.25 or 0.5 MG/DOSE, 2 MG/3ML solution pen-injector, Inject 0.25 mg under the skin into the appropriate area as directed 1 (One) Time Per Week., Disp: , Rfl:     solifenacin (VESICARE) 10 MG tablet, Take 1 tablet by mouth Daily., Disp: 30 tablet, Rfl: 2    Spiriva Respimat 1.25 MCG/ACT aerosol solution inhaler, Inhale 2 puffs Daily., Disp: 4 g, Rfl: 2    azelastine (ASTEPRO) 0.15 % solution nasal spray, 2 sprays into the nostril(s) as directed by provider 2 (Two) Times a Day., Disp: 30 mL, Rfl: 2    budesonide-formoterol (SYMBICORT) 80-4.5 MCG/ACT inhaler, Inhale 2 puffs Every 6 (Six) Hours. (Patient not taking: Reported on 3/27/2025), Disp: 10.2 g, Rfl: 5    clobetasol (TEMOVATE) 0.05 % external solution, Apply  topically to the appropriate area as directed 2 (Two) Times a Day., Disp: 50 mL, Rfl: 1    nystatin (MYCOSTATIN) 876256 UNIT/GM powder, Apply 1 gm to the affected skin area twice daily (Patient not taking: Reported on 3/27/2025), Disp: 60 g, Rfl: 0    Objective     Vital Signs     Body mass index is 43.98 kg/m².      03/27/25  1409   Weight: 113 kg (248 lb 4.8 oz)            Physical Exam  Vitals reviewed.   Constitutional:       Appearance: She is obese.   Cardiovascular:      Rate and Rhythm: Normal rate and regular rhythm.   Pulmonary:      Effort: Pulmonary effort is normal.   Musculoskeletal:         General: Normal range of motion.   Skin:     General: Skin is warm and dry.   Neurological:      Mental Status: She is alert and oriented to person, place, and time.   Psychiatric:         Mood and Affect: Mood normal.         Behavior: Behavior normal.          Physical Exam  Vital Signs  The patient's weight is 248 pounds. BMI is 43.9.    Results      I reviewed the patient's new clinical results.      Assessment & Plan     Assessment & Plan  1. Obesity.  Her Body Mass Index (BMI) is currently at 43.9, placing her in the class III category of obesity. Despite previous attempts with Atkins,  fasting, calorie counting, Weight Watchers, and Ozempic, she has not achieved significant weight loss. A comprehensive discussion was held regarding the potential for weight gain following smoking cessation, as food may become a coping mechanism for stress and anxiety. She was advised to snack on healthy foods such as carrots to mitigate this risk. The importance of psychiatric clearance was emphasized, as it can provide strategies to manage weight gain. It was clarified that she would not be excluded from the program due to minor weight gain post-smoking cessation, but efforts will be made to prevent further weight gain for her overall health. A plan will be made to proceed with the surgery workup. The plan is to prepare her for a sleeve gastrectomy during her next visit.    2. Type 2 Diabetes Mellitus.  She is currently on Ozempic, which helps control her diabetes but has not contributed to weight loss. She should continue her current medication regimen.    3. Nicotine Dependence.  She has reduced her nicotine use from 3 packs a day to 1 pack a day and is attempting to quit entirely using patches. She was informed about the availability of smoking cessation classes, both in-person and virtual, and was encouraged to enroll. She must abstain from smoking for a minimum of 30 days prior to surgery, including the use of patches, and successfully pass the test.    Diagnoses and all orders for this visit:    1. Class 3 severe obesity due to excess calories with serious comorbidity and body mass index (BMI) of 40.0 to 44.9 in adult (Primary)    2. Primary hypertension    3. Gastroesophageal reflux disease with esophagitis without hemorrhage    4. Type 2 diabetes mellitus with other specified complication, without long-term current use of insulin    5. Sleep apnea, unspecified type    6. Cigarette nicotine dependence without complication        I discussed the patient's findings and my recommendations with patient.      Anastasiia Rubio  reports that she has been smoking cigarettes. She has a 35 pack-year smoking history. She has been exposed to tobacco smoke. She has never used smokeless tobacco. I have educated her on the risk of diseases from using tobacco products such as cancer, COPD, and heart disease.     I advised her to quit and she is willing to quit. We have discussed the following method/s for tobacco cessation:  Education Material and Counseling.  Together we have set a quit date for 1 month from today.  She will follow up with me in 1 month or sooner to check on her progress.    I spent 3.5 minutes counseling the patient.          She has been provided a structured dietary regimen based off of her behavior.  I discussed with the patient the etiology of the disease of obesity and the potential comorbid conditions associated with this disease.  She was instructed to follow the dietary regimen and follow-up with our program in 1 month's time with any additional questions as they may arise during this time.  We emphasized on focusing on adequate protein and meals high in fiber as well as adequate hydration that exceed 64 ounces of water daily.    I explained that I anticipate the patient to lose weight prior to her next monthly visit.  I encouraged patient to have a reward day once a month.  Patient will begin GREEN meal plan.      AMY Nolasco   14:58 CDT  03/31/25      A total of 30 minutes was spent face to face with this patient and over half of the time was spent on counseling and coordination of care for the disease of obesity. We specifically reviewed the dietary prescription and I made recommendations toward increasing exercise as tolerated as well as focusing on training their behavior toward storing less.  Patient or patient representative verbalized consent for the use of Ambient Listening during the visit with  AMY Nolasco for chart documentation. 3/31/2025  14:44 CDT

## 2025-03-27 NOTE — PROGRESS NOTES
"Metabolic and Bariatric Surgery Adult Nutrition Assessment    Patient Name: Anastasiia Rubio   YOB: 1970   MRN: [id number]     Assessment Date:  03/27/2025     Reason for Visit: New Patient Nutrition Education Class    Treatment Pathway: Preoperative Bariatric Surgery    Assessment    Anthropometrics   Wt Readings from Last 1 Encounters:   03/27/25 113 kg (248 lb 4.8 oz)     Ht Readings from Last 1 Encounters:   03/27/25 160 cm (63\")     BMI Readings from Last 1 Encounters:   03/27/25 43.98 kg/m²        Initial Weight/Date: 248.3 lbs (March 2025)    Past Medical History:   Diagnosis Date    Arthritis     Colon polyp     COPD (chronic obstructive pulmonary disease)     Cough 09/10/2013    COVID-19     CPAP (continuous positive airway pressure) dependence     Diabetes mellitus     GERD (gastroesophageal reflux disease)     Hyperlipidemia     Hypertension     Hypothyroidism 11/18/2013    Obesity     Sleep apnea     cpap with o2      Past Surgical History:   Procedure Laterality Date    AUGMENTATION MAMMAPLASTY      COLONOSCOPY N/A 05/30/2023    Procedure: COLONOSCOPY WITH ANESTHESIA;  Surgeon: Christopher Freeman MD;  Location: [facility] ENDOSCOPY;  Service: Gastroenterology;  Laterality: N/A;  preop; epigastric pain   postop polyp  PCP Nani denis    CYSTOSCOPY W/ BULKING AGENT INJECTION N/A 10/09/2023    Procedure: CYSTOSCOPY WITH BULKING AGENT INJECTION;  Surgeon: Octaviano Wall MD;  Location: [facility] OR;  Service: Urology;  Laterality: N/A;    ENDOSCOPY N/A 05/30/2023    Procedure: ESOPHAGOGASTRODUODENOSCOPY WITH ANESTHESIA;  Surgeon: Christopher Freeman MD;  Location: [facility] ENDOSCOPY;  Service: Gastroenterology;  Laterality: N/A;  preop; epigastric pain  postop normal  PCP Nani denis    LAPAROSCOPIC CHOLECYSTECTOMY      TUBAL ABDOMINAL LIGATION        Current Outpatient Medications   Medication Sig Dispense Refill    albuterol (PROVENTIL) (2.5 MG/3ML) 0.083% nebulizer solution Take 2.5 mg by " nebulization Every 4 (Four) Hours As Needed for Wheezing. 90 mL 1    albuterol sulfate HFA (ProAir HFA) 108 (90 Base) MCG/ACT inhaler Inhale 2 puffs Every 4 (Four) Hours As Needed for Wheezing. 8 g 11    atorvastatin (LIPITOR) 40 MG tablet Take 1 tablet by mouth Every Night. 90 tablet 0    azelastine (ASTEPRO) 0.15 % solution nasal spray 2 sprays into the nostril(s) as directed by provider 2 (Two) Times a Day. 30 mL 2    Blood Glucose Monitoring Suppl (OneTouch Verio Flex System) w/Device kit Test blood glucose level 1-2 times per day DX:E11.9      budesonide-formoterol (SYMBICORT) 80-4.5 MCG/ACT inhaler Inhale 2 puffs Every 6 (Six) Hours. (Patient not taking: Reported on 3/27/2025) 10.2 g 5    celecoxib (CeleBREX) 100 MG capsule Take 1 capsule by mouth 2 (Two) Times a Day As Needed for Mild Pain. 180 capsule 1    chlorthalidone (HYGROTON) 25 MG tablet Take 1 tablet by mouth Daily. 90 tablet 0    clobetasol (TEMOVATE) 0.05 % external solution Apply  topically to the appropriate area as directed 2 (Two) Times a Day. 50 mL 1    ergocalciferol (ERGOCALCIFEROL) 1.25 MG (56548 UT) capsule Take 1 capsule by mouth 2 (Two) Times a Week. 12 capsule 5    Lancets (OneTouch Delica Plus [id number]) misc 1 each by Other route Daily. 100 each 5    metoprolol succinate XL (TOPROL-XL) 50 MG 24 hr tablet Take 1 tablet by mouth Daily. 90 tablet 1    nicotine (Nicoderm CQ) 21 MG/24HR patch Place 1 patch on the skin as directed by provider Daily. 60 each 0    nystatin (MYCOSTATIN) 770429 UNIT/GM powder Apply 1 gm to the affected skin area twice daily (Patient not taking: Reported on 3/27/2025) 60 g 0    ondansetron ODT (ZOFRAN-ODT) 4 MG disintegrating tablet Place 1 tablet on the tongue Every 8 (Eight) Hours As Needed for Nausea or Vomiting. 20 tablet 1    OneTouch Verio test strip Check FBS daily and prn for Diabetes 100 each 5    Ozempic, 0.25 or 0.5 MG/DOSE, 2 MG/3ML solution pen-injector Inject 0.25 mg under the skin into the  appropriate area as directed 1 (One) Time Per Week.      solifenacin (VESICARE) 10 MG tablet Take 1 tablet by mouth Daily. 30 tablet 2    Spiriva Respimat 1.25 MCG/ACT aerosol solution inhaler Inhale 2 puffs Daily. 4 g 2     No current facility-administered medications for this visit.      Allergies   Allergen Reactions    Metformin Other (See Comments) and Myalgia     When taking metformin she feels run down, fatigued, and body aches.  Off it she improves          Nutrition Intervention  Nutrition education and nutrition coaching for behavior change provided.  Strategies used included Comprehensive education & skill development for measuring portions, reading food labels, calculating protein, meal planning, understanding appropriate drink choices, and evaluating condiments, seasonings, and cooking methods.   Review of medical weight loss prescription plan and reviewed nutritional needs for Preoperative Bariatric Surgery.  Self-monitoring strategies such as keeping a food journal (on paper or electronically) were discussed.  Recommended increasing physical activity, beyond normal daily habits, gradually working to reach ~30 minutes daily.     Recommended Diet Changes- Green Prescription Meal Plan. Provided Sample Menus  Eat 4 meals per day with protein and vegetables at each meal, no carbs after meal 2., Protein goal: 65 gms., Eat vegetables first at each meal., Discussed protein guidelines for shakes and bars., Reduce snacking -use foods from free foods list only., Reduce fat, sugar, and/or salt in food choices., Choose more nutrient dense foods., Choose foods with increased fiber., Monitor portion sizes using a food scale and/or measuring cup., Eliminate soda and sugar-sweetened beverages, and Increase fluid intake to 64 ounces per day       Monitoring/Evaluation Plan  Anticipate follow up in one month. Continue collaboration of care with physician and treatment team.     Time Spent 30 minutes    Electronically  signed by  Jeny Epps RDN, DUSTIN  03/27/2025 14:51 CDT.

## 2025-03-27 NOTE — TELEPHONE ENCOUNTER
Patient having severe sciatica pain and would like to know if she could have something called in for her or maybe even if she could come in for a shot.

## 2025-04-02 ENCOUNTER — HOSPITAL ENCOUNTER (OUTPATIENT)
Dept: CT IMAGING | Facility: HOSPITAL | Age: 55
Discharge: HOME | End: 2025-04-02
Payer: COMMERCIAL

## 2025-04-04 ENCOUNTER — TELEPHONE (OUTPATIENT)
Dept: OTOLARYNGOLOGY | Facility: CLINIC | Age: 55
End: 2025-04-04
Payer: COMMERCIAL

## 2025-04-09 NOTE — PROGRESS NOTES
Subjective    Ms. Rubio is 55 y.o. female    Chief Complaint: Mixed urinary incontinence    History of Present Illness    55-year-old female established patient in with report return of incontinence.  Symptom onset November 2024 after respiratory illness.  Reports initially started out after a large cough.  Patient had started noticing inability to make it to the bathroom as well therefore started patient on oxybutynin.  Patient noted no improvement.  Was then sent in solifenacin for which patient states she did not know the prescription was sent and therefore has not been using.  Patient now reporting her biggest complaint being worsening leakage now with just walking, as well as significant leakage with coughing or sneezing to the point patient states she is requiring 4 pull-ups daily and is humiliated.       Previously underwent Bulkamid Dr. Wall 10/2023 due to stress incontinence/ISD.  At which time noted complete resolution.    The following portions of the patient's history were reviewed and updated as appropriate: allergies, current medications, past family history, past medical history, past social history, past surgical history and problem list.    Review of Systems   Constitutional:  Negative for chills, fatigue and fever.   Gastrointestinal:  Negative for nausea and vomiting.         Current Outpatient Medications:     albuterol (PROVENTIL) (2.5 MG/3ML) 0.083% nebulizer solution, Take 2.5 mg by nebulization Every 4 (Four) Hours As Needed for Wheezing., Disp: 90 mL, Rfl: 1    albuterol sulfate HFA (ProAir HFA) 108 (90 Base) MCG/ACT inhaler, Inhale 2 puffs Every 4 (Four) Hours As Needed for Wheezing., Disp: 8 g, Rfl: 11    azelastine (ASTEPRO) 0.15 % solution nasal spray, 2 sprays into the nostril(s) as directed by provider 2 (Two) Times a Day., Disp: 30 mL, Rfl: 2    Blood Glucose Monitoring Suppl (OneTouch Verio Flex System) w/Device kit, Test blood glucose level 1-2 times per day DX:E11.9, Disp: , Rfl:      celecoxib (CeleBREX) 100 MG capsule, Take 1 capsule by mouth 2 (Two) Times a Day As Needed for Mild Pain., Disp: 180 capsule, Rfl: 1    chlorthalidone (HYGROTON) 25 MG tablet, Take 1 tablet by mouth Daily., Disp: 90 tablet, Rfl: 0    clobetasol (TEMOVATE) 0.05 % external solution, Apply  topically to the appropriate area as directed 2 (Two) Times a Day., Disp: 50 mL, Rfl: 1    ergocalciferol (ERGOCALCIFEROL) 1.25 MG (86919 UT) capsule, Take 1 capsule by mouth 2 (Two) Times a Week., Disp: 12 capsule, Rfl: 5    Lancets (OneTouch Delica Plus [id number]) misc, 1 each by Other route Daily., Disp: 100 each, Rfl: 5    methocarbamol (ROBAXIN) 750 MG tablet, Take 1 tablet by mouth 4 (Four) Times a Day As Needed for Muscle Spasms., Disp: 120 tablet, Rfl: 1    metoprolol succinate XL (TOPROL-XL) 50 MG 24 hr tablet, Take 1 tablet by mouth Daily., Disp: 90 tablet, Rfl: 1    nicotine (Nicoderm CQ) 21 MG/24HR patch, Place 1 patch on the skin as directed by provider Daily., Disp: 60 each, Rfl: 0    ondansetron ODT (ZOFRAN-ODT) 4 MG disintegrating tablet, Place 1 tablet on the tongue Every 8 (Eight) Hours As Needed for Nausea or Vomiting., Disp: 20 tablet, Rfl: 1    OneTouch Verio test strip, Check FBS daily and prn for Diabetes, Disp: 100 each, Rfl: 5    Ozempic, 0.25 or 0.5 MG/DOSE, 2 MG/3ML solution pen-injector, Inject 0.25 mg under the skin into the appropriate area as directed 1 (One) Time Per Week., Disp: , Rfl:     solifenacin (VESICARE) 10 MG tablet, Take 1 tablet by mouth Daily., Disp: 30 tablet, Rfl: 11    Spiriva Respimat 1.25 MCG/ACT aerosol solution inhaler, Inhale 2 puffs Daily., Disp: 4 g, Rfl: 2    budesonide-formoterol (SYMBICORT) 80-4.5 MCG/ACT inhaler, Inhale 2 puffs Every 6 (Six) Hours. (Patient not taking: Reported on 4/14/2025), Disp: 10.2 g, Rfl: 5    nystatin (MYCOSTATIN) 326919 UNIT/GM powder, Apply 1 gm to the affected skin area twice daily (Patient not taking: Reported on 4/14/2025), Disp: 60 g, Rfl:  0    Past Medical History:   Diagnosis Date    Arthritis     Colon polyp     COPD (chronic obstructive pulmonary disease)     Cough 09/10/2013    COVID-19     CPAP (continuous positive airway pressure) dependence     Diabetes mellitus     GERD (gastroesophageal reflux disease)     Hyperlipidemia     Hypertension     Hypothyroidism 11/18/2013    Obesity     Sleep apnea     cpap with o2       Past Surgical History:   Procedure Laterality Date    AUGMENTATION MAMMAPLASTY      COLONOSCOPY N/A 05/30/2023    Procedure: COLONOSCOPY WITH ANESTHESIA;  Surgeon: Christopher Freeman MD;  Location: [facility] ENDOSCOPY;  Service: Gastroenterology;  Laterality: N/A;  preop; epigastric pain   postop polyp  PCP Nani denis    CYSTOSCOPY W/ BULKING AGENT INJECTION N/A 10/09/2023    Procedure: CYSTOSCOPY WITH BULKING AGENT INJECTION;  Surgeon: Octaviano Wall MD;  Location: [facility] OR;  Service: Urology;  Laterality: N/A;    ENDOSCOPY N/A 05/30/2023    Procedure: ESOPHAGOGASTRODUODENOSCOPY WITH ANESTHESIA;  Surgeon: Christopher Freeman MD;  Location: [facility] ENDOSCOPY;  Service: Gastroenterology;  Laterality: N/A;  preop; epigastric pain  postop normal  PCP Nani denis    LAPAROSCOPIC CHOLECYSTECTOMY      TUBAL ABDOMINAL LIGATION         Social History     Socioeconomic History    Marital status:    Tobacco Use    Smoking status: Every Day     Current packs/day: 1.00     Average packs/day: 1 pack/day for 35.0 years (35.0 ttl pk-yrs)     Types: Cigarettes     Passive exposure: Past    Smokeless tobacco: Never   Vaping Use    Vaping status: Never Used   Substance and Sexual Activity    Alcohol use: Never    Drug use: Never    Sexual activity: Not Currently     Partners: Male     Birth control/protection: None     Comment: I do not use any form of birth control       Family History   Problem Relation Age of Onset    Heart failure Mother     Diabetes Mother         2000    Lung cancer Mother     Heart disease Mother     Cancer  "Mother         Lung    COPD Mother     Hypertension Mother     Heart attack Father     Heart disease Father     Diabetes Father         2010    Colon cancer Father     Cancer Father         Colon    Hyperlipidemia Father     Stroke Father     Hypertension Father     Alcohol abuse Sister     Colon cancer Brother     Cancer Brother         Colon    Alcohol abuse Brother     Drug abuse Brother         1998    Breast cancer Paternal Aunt     Cancer Paternal Grandfather         Lung Cancer       Objective    Temp 97.8 °F (36.6 °C)   Ht 160 cm (62.99\")   Wt 111 kg (244 lb)   LMP  (LMP Unknown) Comment: lmp January  BMI 43.23 kg/m²     Physical Exam  Nursing note reviewed.   Constitutional:       General: She is not in acute distress.     Appearance: Normal appearance. She is not ill-appearing.   HENT:      Nose: No congestion.   Abdominal:      Tenderness: There is no right CVA tenderness or left CVA tenderness.      Hernia: No hernia is present.   Skin:     General: Skin is warm and dry.   Neurological:      Mental Status: She is alert and oriented to person, place, and time.   Psychiatric:         Mood and Affect: Mood normal.         Behavior: Behavior normal.             Results for orders placed or performed in visit on 02/17/25   [facility],    Collection Time: 02/17/25  4:05 PM    Specimen: Blood   Result Value Ref Range    Overall Interpretation       NEGATIVE: No Clinically Significant Variants Detected    Interpretation Report See Notes^^LN      Assessment and Plan    Diagnoses and all orders for this visit:    1. Mixed incontinence (Primary)  -     Cancel: POC Urinalysis Dipstick, Multipro  -     solifenacin (VESICARE) 10 MG tablet; Take 1 tablet by mouth Daily.  Dispense: 30 tablet; Refill: 11    2. Stress incontinence (female) (male)      Will resend in the prescription for solifenacin as it sound patient is dealing with mixed urinary incontinence and previously failed oxybutynin. "  Patient reporting stress greater than urge.  Patient is interested in possible undergoing a second Bulkamid if Dr. Wall feels would be warranted.  I will speak with Dr. Wall and get back with patient.

## 2025-04-10 ENCOUNTER — OFFICE VISIT (OUTPATIENT)
Dept: FAMILY MEDICINE CLINIC | Facility: CLINIC | Age: 55
End: 2025-04-10
Payer: COMMERCIAL

## 2025-04-10 VITALS
WEIGHT: 244 LBS | OXYGEN SATURATION: 92 % | HEART RATE: 106 BPM | DIASTOLIC BLOOD PRESSURE: 78 MMHG | HEIGHT: 63 IN | SYSTOLIC BLOOD PRESSURE: 135 MMHG | RESPIRATION RATE: 20 BRPM | TEMPERATURE: 98 F | BODY MASS INDEX: 43.23 KG/M2

## 2025-04-10 DIAGNOSIS — E78.2 MIXED HYPERLIPIDEMIA: ICD-10-CM

## 2025-04-10 DIAGNOSIS — I10 PRIMARY HYPERTENSION: ICD-10-CM

## 2025-04-10 DIAGNOSIS — J44.9 COPD MIXED TYPE: ICD-10-CM

## 2025-04-10 DIAGNOSIS — M62.838 MUSCLE SPASMS OF NECK: Primary | ICD-10-CM

## 2025-04-10 PROCEDURE — 3044F HG A1C LEVEL LT 7.0%: CPT | Performed by: FAMILY MEDICINE

## 2025-04-10 PROCEDURE — 1126F AMNT PAIN NOTED NONE PRSNT: CPT | Performed by: FAMILY MEDICINE

## 2025-04-10 PROCEDURE — 99214 OFFICE O/P EST MOD 30 MIN: CPT | Performed by: FAMILY MEDICINE

## 2025-04-10 PROCEDURE — 3078F DIAST BP <80 MM HG: CPT | Performed by: FAMILY MEDICINE

## 2025-04-10 PROCEDURE — 3075F SYST BP GE 130 - 139MM HG: CPT | Performed by: FAMILY MEDICINE

## 2025-04-10 RX ORDER — METHOCARBAMOL 750 MG/1
750 TABLET, FILM COATED ORAL 4 TIMES DAILY PRN
Qty: 120 TABLET | Refills: 1 | Status: SHIPPED | OUTPATIENT
Start: 2025-04-10

## 2025-04-10 NOTE — PROGRESS NOTES
"Chief Complaint  COPD (Pt is here for a follow up )    Subjective        Anastasiia Rubio presents to [facility]  COPD      Mrs. Rubio presents for a follow up on multiple issues.      She has COPD.  She was previously on oxygen after being hospitalized.  She was only using it at night time.  She has been off of oxygen for more than 6 months now.  She has recently started a new job.  She needs a letter saying she does not have restrictions.  She does not get SOA with activity or exertion.  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      She has concerns of headaches.  She says she has had a headache every day for the last week.  She has taken tylenol and Ibuprofen.  She had some left over pain medications and took 1 of those and none of those helped.      The headache is in the back of her head.  She has a pulsating sensation.  She does not have photophobia or phonophobia.  She does not get nauseated with the headaches.  She has neck pain.  She says she thinks she slept on it wrong.      She has HTN.  She is on Chlorthalidone 25 mg daily and Metoprolol XL 50 mg daily.  BP is well-controlled at 135/78.      She has HLD.  She stopped taking her statin due to myalgias.        Objective   Vital Signs:  /78 (BP Location: Left arm, Patient Position: Sitting, Cuff Size: Adult)   Pulse 106   Temp 98 °F (36.7 °C) (Temporal)   Resp 20   Ht 160 cm (62.99\")   Wt 111 kg (244 lb)   SpO2 92%   BMI 43.23 kg/m²   Estimated body mass index is 43.23 kg/m² as calculated from the following:    Height as of this encounter: 160 cm (62.99\").    Weight as of this encounter: 111 kg (244 lb).            Physical Exam  Vitals reviewed.   Constitutional:       General: She is not in acute distress.     Appearance: Normal appearance. She is normal weight. She is not ill-appearing, toxic-appearing or diaphoretic.   HENT:      Head: Normocephalic and atraumatic.      Right Ear: External ear normal.     "  Left Ear: External ear normal.      Nose: Nose normal.   Eyes:      Extraocular Movements: Extraocular movements intact.   Cardiovascular:      Rate and Rhythm: Normal rate and regular rhythm.   Pulmonary:      Effort: Pulmonary effort is normal. No respiratory distress.      Breath sounds: Normal breath sounds.   Skin:     General: Skin is warm and dry.      Coloration: Skin is not jaundiced or pale.   Neurological:      Mental Status: She is alert and oriented to person, place, and time.   Psychiatric:         Mood and Affect: Mood normal.         Behavior: Behavior normal.         Thought Content: Thought content normal.         Judgment: Judgment normal.            Result Review :                Assessment and Plan   Diagnoses and all orders for this visit:    1. Muscle spasms of neck (Primary)  -     methocarbamol (ROBAXIN) 750 MG tablet; Take 1 tablet by mouth 4 (Four) Times a Day As Needed for Muscle Spasms.  Dispense: 120 tablet; Refill: 1    2. Mixed hyperlipidemia  -     Lipid Panel; Future    3. COPD mixed type    4. Primary hypertension    Continue inhalers  Continue Hygroton and Metoprolol  Will check lipid panel before next appointment    Follow up in 2 months              Follow Up   Return in about 2 months (around 6/10/2025) for Recheck.  Patient was given instructions and counseling regarding her condition or for health maintenance advice. Please see specific information pulled into the AVS if appropriate.

## 2025-04-10 NOTE — LETTER
April 10, 2025     Patient: Anastasiia Rubio   YOB: 1970   Date of Visit: 4/10/2025       To Whom It May Concern:    Anastasiia Rubio is under [facility] care at [facility].  She was previously on oxygen therapy.  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  She has not been on this in over 6 months.  She is able to ambulate and perform activities without breathing issues.  To the best of my knowledge, she does not have a reason to restrict her activities at this time.  She is able to work without restriction.           Sincerely,        Prakash Garcia MD    CC: No Recipients

## 2025-04-14 ENCOUNTER — TELEMEDICINE (OUTPATIENT)
Dept: BARIATRICS/WEIGHT MGMT | Facility: CLINIC | Age: 55
End: 2025-04-14
Payer: COMMERCIAL

## 2025-04-14 ENCOUNTER — OFFICE VISIT (OUTPATIENT)
Dept: UROLOGY | Facility: CLINIC | Age: 55
End: 2025-04-14
Payer: COMMERCIAL

## 2025-04-14 VITALS — WEIGHT: 244 LBS | TEMPERATURE: 97.8 F | HEIGHT: 63 IN | BODY MASS INDEX: 43.23 KG/M2

## 2025-04-14 DIAGNOSIS — N39.46 MIXED INCONTINENCE: Primary | ICD-10-CM

## 2025-04-14 DIAGNOSIS — N39.3 STRESS INCONTINENCE (FEMALE) (MALE): ICD-10-CM

## 2025-04-14 DIAGNOSIS — R07.89 OTHER CHEST PAIN: Primary | ICD-10-CM

## 2025-04-14 DIAGNOSIS — E66.01 OBESITY, CLASS III, BMI 40-49.9 (MORBID OBESITY): Primary | ICD-10-CM

## 2025-04-14 PROCEDURE — 99214 OFFICE O/P EST MOD 30 MIN: CPT

## 2025-04-14 PROCEDURE — 1160F RVW MEDS BY RX/DR IN RCRD: CPT

## 2025-04-14 PROCEDURE — 1159F MED LIST DOCD IN RCRD: CPT

## 2025-04-14 RX ORDER — SOLIFENACIN SUCCINATE 10 MG/1
10 TABLET, FILM COATED ORAL DAILY
Qty: 30 TABLET | Refills: 11 | Status: SHIPPED | OUTPATIENT
Start: 2025-04-14
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
## 2025-04-17 DIAGNOSIS — E11.65 TYPE 2 DIABETES MELLITUS WITH HYPERGLYCEMIA: ICD-10-CM

## 2025-04-17 RX ORDER — SEMAGLUTIDE 0.68 MG/ML
0.5 INJECTION, SOLUTION SUBCUTANEOUS WEEKLY
Qty: 3 ML | Refills: 0 | Status: SHIPPED | OUTPATIENT
Start: 2025-04-17

## 2025-04-17 NOTE — TELEPHONE ENCOUNTER
Rx Refill Note  Requested Prescriptions     Pending Prescriptions Disp Refills    Ozempic, 0.25 or 0.5 MG/DOSE, 2 MG/3ML solution pen-injector [Pharmacy Med Name: Ozempic (0.25 or 0.5 MG/DOSE) 2 MG/3ML Subcutaneous Solution Pen-injector] 3 mL 0     Sig: INJECT 0.5 MG SUBCUTANEOUSLY ONCE A WEEK      Last office visit with prescribing clinician: 1/7/2025   Last telemedicine visit with prescribing clinician: 3/4/2025   Next office visit with prescribing clinician: 6/10/2025     Tommy Mayo MA  04/17/25, 14:01 CDT

## 2025-04-23 ENCOUNTER — HOSPITAL ENCOUNTER (OUTPATIENT)
Dept: CARDIOLOGY | Facility: HOSPITAL | Age: 55
Discharge: HOME | End: 2025-04-23
Payer: COMMERCIAL

## 2025-04-24 ENCOUNTER — TELEPHONE (OUTPATIENT)
Dept: UROLOGY | Facility: CLINIC | Age: 55
End: 2025-04-24
Payer: COMMERCIAL

## 2025-04-24 PROBLEM — N39.3 STRESS INCONTINENCE (FEMALE) (MALE): Status: ACTIVE | Noted: 2025-04-14

## 2025-04-24 NOTE — TELEPHONE ENCOUNTER
Spoke with patient to let her know her surgery date and time. She is aware to stop her Ozempic a week prior to surgery. Patient was no longer taking Celebrex. I let her know pre-work was on 5/2/25 @ 2:00

## 2025-04-29 DIAGNOSIS — I10 PRIMARY HYPERTENSION: ICD-10-CM

## 2025-04-30 RX ORDER — LANCETS 33 GAUGE
EACH MISCELLANEOUS
Qty: 100 EACH | Refills: 0 | Status: SHIPPED | OUTPATIENT
Start: 2025-04-30

## 2025-04-30 RX ORDER — METOPROLOL SUCCINATE 50 MG/1
50 TABLET, EXTENDED RELEASE ORAL DAILY
Qty: 90 TABLET | Refills: 0 | OUTPATIENT
Start: 2025-04-30

## 2025-04-30 RX ORDER — BLOOD SUGAR DIAGNOSTIC
STRIP MISCELLANEOUS
Qty: 100 EACH | Refills: 0 | Status: SHIPPED | OUTPATIENT
Start: 2025-04-30

## 2025-04-30 NOTE — TELEPHONE ENCOUNTER
Metoprolol too soon    Rx Refill Note  Requested Prescriptions     Pending Prescriptions Disp Refills    [employer] Verio test strip [Pharmacy Med Name: [employer] Verio In Vitro Strip] 100 each 0     Sig: USE AS DIRECTED DAILY  TO  CHECK  FASTING  BLOOD  SUGAR  AND  AS  NEEDED  FOR  DIABETES    Lancets (OneTouch Delica Plus [id number]) misc [Pharmacy Med Name: ONETOUCH DELICA BLANCO 33G MIS] 100 each 0     Sig: USE 1 TO CHECK GLUCOSE DAILY    metoprolol succinate XL (TOPROL-XL) 50 MG 24 hr tablet [Pharmacy Med Name: Metoprolol Succinate ER 50 MG Oral Tablet Extended Release 24 Hour] 90 tablet 0     Sig: Take 1 tablet by mouth once daily      Last office visit with prescribing clinician: 1/7/2025   Last telemedicine visit with prescribing clinician: 3/4/2025   Next office visit with prescribing clinician: 6/10/2025     Amna Prince MA  04/30/25, 15:22 CDT

## 2025-05-02 ENCOUNTER — PRE-ADMISSION TESTING (OUTPATIENT)
Dept: PREADMISSION TESTING | Facility: HOSPITAL | Age: 55
End: 2025-05-02
Payer: COMMERCIAL

## 2025-05-02 VITALS
OXYGEN SATURATION: 95 % | SYSTOLIC BLOOD PRESSURE: 119 MMHG | WEIGHT: 244.93 LBS | HEART RATE: 83 BPM | BODY MASS INDEX: 41.82 KG/M2 | RESPIRATION RATE: 26 BRPM | HEIGHT: 64 IN | DIASTOLIC BLOOD PRESSURE: 75 MMHG

## 2025-05-02 DIAGNOSIS — N39.3 STRESS INCONTINENCE (FEMALE) (MALE): ICD-10-CM

## 2025-05-02 LAB
ANION GAP SERPL CALCULATED.3IONS-SCNC: 12 MMOL/L (ref 5–15)
BUN SERPL-MCNC: 7 MG/DL (ref 6–20)
BUN/CREAT SERPL: 16.7 (ref 7–25)
CALCIUM SPEC-SCNC: 9.4 MG/DL (ref 8.6–10.5)
CHLORIDE SERPL-SCNC: 99 MMOL/L (ref 98–107)
CO2 SERPL-SCNC: 27 MMOL/L (ref 22–29)
CREAT SERPL-MCNC: 0.42 MG/DL (ref 0.57–1)
DEPRECATED RDW RBC AUTO: 42.7 FL (ref 37–54)
EGFRCR SERPLBLD CKD-EPI 2021: 117.1 ML/MIN/1.73
ERYTHROCYTE [DISTWIDTH] IN BLOOD BY AUTOMATED COUNT: 13.2 % (ref 12.3–15.4)
GLUCOSE SERPL-MCNC: 104 MG/DL (ref 65–99)
HCT VFR BLD AUTO: 53.6 % (ref 34–46.6)
HGB BLD-MCNC: 18.9 G/DL (ref 12–15.9)
MCH RBC QN AUTO: 31.1 PG (ref 26.6–33)
MCHC RBC AUTO-ENTMCNC: 35.3 G/DL (ref 31.5–35.7)
MCV RBC AUTO: 88.3 FL (ref 79–97)
PLATELET # BLD AUTO: 228 10*3/MM3 (ref 140–450)
PMV BLD AUTO: 9.8 FL (ref 6–12)
POTASSIUM SERPL-SCNC: 3.3 MMOL/L (ref 3.5–5.2)
RBC # BLD AUTO: 6.07 10*6/MM3 (ref 3.77–5.28)
SODIUM SERPL-SCNC: 138 MMOL/L (ref 136–145)
WBC NRBC COR # BLD AUTO: 12.09 10*3/MM3 (ref 3.4–10.8)

## 2025-05-02 PROCEDURE — 85027 COMPLETE CBC AUTOMATED: CPT

## 2025-05-02 PROCEDURE — 80048 BASIC METABOLIC PNL TOTAL CA: CPT

## 2025-05-02 PROCEDURE — 36415 COLL VENOUS BLD VENIPUNCTURE: CPT

## 2025-05-02 RX ORDER — ATORVASTATIN CALCIUM 40 MG/1
40 TABLET, FILM COATED ORAL NIGHTLY
COMMUNITY

## 2025-05-02 NOTE — DISCHARGE INSTRUCTIONS
Preparing for Surgery  Follow these instructions before the procedure:  Several days or weeks before your procedure  Medication(s) you need to stop   1 week prior to surgery:  Continue to hold your Ozempic until after surgery      Ask your health care provider about:  Changing or stopping your regular medicines. This is especially important if you are taking diabetes medicines or blood thinners.  Taking medicines such as aspirin and ibuprofen. These medicines can thin your blood. Do not take these medicines unless your health care provider tells you to take them.  Taking over-the-counter medicines, vitamins, herbs, and supplements.    Contact your surgeon if you:  Develop a fever of more than 100.4°F (38°C) or other feelings of illness during the 48 hours before your surgery.  Have symptoms that get worse.  Have questions or concerns about your surgery.  If you are going home the same day of your surgery you will need to arrange for a responsible adult, age 18 years old or older, to drive you home from the hospital and stay with you for 24 hours. Verification of the  will be made prior to any procedure requiring sedation. You may not go home in a taxi or any form of public transportation by yourself.     Day before your procedure    24 hours before your procedure DO NOT drink alcoholic beverages or smoke.  24 hours before your procedure STOP taking Erectile Dysfunction medication (i.e.,Cialis, Viagra)   You may be asked to shower with a germ-killing soap.  Day of your procedure   You may take the following medication(s) the morning of surgery with a sip of water:  Please take Metoprolol Day of Surgery with a sip of Water      8 hours before your scheduled arrival time, STOP all food, any dairy products, and full liquids. This includes hard candy, chewing gum or mints. This is extremely important to prevent serious complications.     Up to 2 hours before your scheduled arrival time, you may have clear liquids  no cream, powder, or pulp of any kind. Safe options are water, black coffee, plain tea, soda, Gatorade/Powerade, clear broth, apple juice.    2 hours before your scheduled arrival time, STOP drinking clear liquids.    You may need to take another shower with a germ-killing soap before you leave home in the morning. Do not use perfumes, colognes, or body lotions.  Wear comfortable loose-fitting clothing.  Remove all jewelry including body piercing and rings, dark colored nail polish, and make up prior to arrival at the hospital. Leave all valuables at home.   Bring your hearing aids if you rely on them.  Do not wear contact lenses. If you wear eyeglasses remember to bring a case to store them in while you are in surgery.  Do not use denture adhesives since you will be asked to remove them during your surgery.    You do not need to bring your home medications into the hospital.   Bring your sleep apnea device with you on the day of your surgery (if this applies to you).  If you have an Inspire implant for sleep apnea, please bring the remote with you on the day of surgery.  If you wear portable oxygen, bring it with you.   If you are staying overnight, you may bring a bag of items you may need such as slippers, robe and a change of clothes for your discharge. You may want to leave these items in the car until you are ready for them since your family will take your belongings when you leave the pre-operative area.  Arrive at the hospital as scheduled by the office. You will be asked to arrive 2 hours prior to your surgery time in order to prepare for your procedure.  When you arrive at the hospital  Go to the registration desk located at the main entrance of the hospital.  After registration is completed, you will be given a beeper and a sticker sheet. Take the stickers to Outpatient Surgery and place in the tray at the end of the desk to notify the staff that you have arrived and registered.   Return to the lobby to  wait. You are not always called back according to the time of arrival but rather the time your doctor will be ready.  When your beeper lights up and vibrates proceed through the double doors, under the stairs, and a member of the Outpatient Surgery staff will escort you to your preoperative room.

## 2025-05-07 ENCOUNTER — TELEPHONE (OUTPATIENT)
Dept: UROLOGY | Facility: CLINIC | Age: 55
End: 2025-05-07
Payer: COMMERCIAL

## 2025-05-07 NOTE — TELEPHONE ENCOUNTER
Called patient to remind them to arrive at patient registration on 5/9/2025 at 9:00 am for the procedure with Dr. Wall. Spoke with patient.  Patient was informed that the surgery schedule fluctuates so there is no given start time. And to be prepared to be here all day. Told patient if they had any questions to please contact our office at [phone].     Quality 226: Preventive Care And Screening: Tobacco Use: Screening And Cessation Intervention: Patient screened for tobacco use and is an ex/non-smoker Detail Level: Detailed Quality 130: Documentation Of Current Medications In The Medical Record: Current Medications Documented Quality 431: Preventive Care And Screening: Unhealthy Alcohol Use - Screening: Patient not identified as an unhealthy alcohol user when screened for unhealthy alcohol use using a systematic screening method

## 2025-05-09 ENCOUNTER — TELEPHONE (OUTPATIENT)
Dept: UROLOGY | Facility: CLINIC | Age: 55
End: 2025-05-09
Payer: COMMERCIAL

## 2025-05-09 NOTE — TELEPHONE ENCOUNTER
Patient called in sick with Fever.. we canceled her surgery. I let her know I would get a Message over to Dr. Wall's medical assistant to have this rescheduled and someone would be in touch with her.

## 2025-05-09 NOTE — TELEPHONE ENCOUNTER
Called pt to let her know to reach back out to us when she is feeling better so we can reschedule her procedure.

## 2025-05-15 ENCOUNTER — TELEPHONE (OUTPATIENT)
Dept: UROLOGY | Facility: CLINIC | Age: 55
End: 2025-05-15
Payer: COMMERCIAL

## 2025-05-15 NOTE — TELEPHONE ENCOUNTER
Pt called and said she is feeling better and is ready to reschedule her procedure. Told pt I would have to get with Dr. Wall to see what day he wants to put her on, and I would call her back.

## 2025-05-15 NOTE — TELEPHONE ENCOUNTER
Hub staff attempted to follow warm transfer process and was unsuccessful     Caller: EVERETT MAZARIEGOS    Relationship to patient: SELF    Best call back number: [phone]    Patient is needing: PAT CALLING TO RESCHEDULE SURGERY. STATED NOBLE SAID TO CALL BACK WHEN SHE WAS FEELING BETTER. PLEASE CALL PAT BACK. THANK YOU.

## 2025-05-16 ENCOUNTER — TELEPHONE (OUTPATIENT)
Dept: UROLOGY | Facility: CLINIC | Age: 55
End: 2025-05-16
Payer: COMMERCIAL

## 2025-05-16 NOTE — TELEPHONE ENCOUNTER
Called pt to see if 6/4/2025 would be doable. No answer, left message for pt to call our office back.

## 2025-05-16 NOTE — TELEPHONE ENCOUNTER
Called pt to let them know we scheduled their procedure for 6//4/2025. Told pt we will call them two days before their procedure to let them know their arrival time. Also let the pt know they will need a  and should anticipate being there most of the day as surgery times do fluctuate.    Told pt to hold celebrex and semiglutide injection for one week prior to the procedure. Told pt they cannot eat or drink after midnight the night before the procedure.    Told pt their pre-op testing is scheduled for 5/28/2025 at 12:00 pm.     Told pt they will go to patient registration for both their pre-op testing and procedure.     Pt expressed understanding and has no additional questions at this time.

## 2025-05-18 DIAGNOSIS — E11.65 TYPE 2 DIABETES MELLITUS WITH HYPERGLYCEMIA: ICD-10-CM

## 2025-05-19 RX ORDER — SEMAGLUTIDE 0.68 MG/ML
0.5 INJECTION, SOLUTION SUBCUTANEOUS WEEKLY
Qty: 3 ML | Refills: 0 | Status: SHIPPED | OUTPATIENT
Start: 2025-05-19

## 2025-05-21 ENCOUNTER — TELEPHONE (OUTPATIENT)
Dept: FAMILY MEDICINE CLINIC | Facility: CLINIC | Age: 55
End: 2025-05-21
Payer: COMMERCIAL

## 2025-05-28 ENCOUNTER — PRE-ADMISSION TESTING (OUTPATIENT)
Dept: PREADMISSION TESTING | Facility: HOSPITAL | Age: 55
End: 2025-05-28
Payer: COMMERCIAL

## 2025-05-28 ENCOUNTER — OFFICE VISIT (OUTPATIENT)
Dept: OTOLARYNGOLOGY | Facility: CLINIC | Age: 55
End: 2025-05-28
Payer: COMMERCIAL

## 2025-05-28 ENCOUNTER — HOSPITAL ENCOUNTER (OUTPATIENT)
Dept: CT IMAGING | Facility: HOSPITAL | Age: 55
Discharge: HOME | End: 2025-05-28
Payer: COMMERCIAL

## 2025-05-28 VITALS
WEIGHT: 244 LBS | SYSTOLIC BLOOD PRESSURE: 119 MMHG | DIASTOLIC BLOOD PRESSURE: 74 MMHG | TEMPERATURE: 97.9 F | RESPIRATION RATE: 18 BRPM | BODY MASS INDEX: 41.66 KG/M2 | HEART RATE: 78 BPM | HEIGHT: 64 IN

## 2025-05-28 DIAGNOSIS — J34.2 DEVIATED NASAL SEPTUM: Primary | ICD-10-CM

## 2025-05-28 DIAGNOSIS — J34.3 HYPERTROPHY OF INFERIOR NASAL TURBINATE: ICD-10-CM

## 2025-05-28 LAB
ANION GAP SERPL CALCULATED.3IONS-SCNC: 11 MMOL/L (ref 5–15)
BUN SERPL-MCNC: 9.6 MG/DL (ref 6–20)
BUN/CREAT SERPL: 16.6 (ref 7–25)
CALCIUM SPEC-SCNC: 9.3 MG/DL (ref 8.6–10.5)
CHLORIDE SERPL-SCNC: 100 MMOL/L (ref 98–107)
CO2 SERPL-SCNC: 30 MMOL/L (ref 22–29)
CREAT SERPL-MCNC: 0.58 MG/DL (ref 0.57–1)
DEPRECATED RDW RBC AUTO: 43.3 FL (ref 37–54)
EGFRCR SERPLBLD CKD-EPI 2021: 108.4 ML/MIN/1.73
ERYTHROCYTE [DISTWIDTH] IN BLOOD BY AUTOMATED COUNT: 13.1 % (ref 12.3–15.4)
GLUCOSE SERPL-MCNC: 161 MG/DL (ref 65–99)
HCT VFR BLD AUTO: 53.9 % (ref 34–46.6)
HGB BLD-MCNC: 18.4 G/DL (ref 12–15.9)
MCH RBC QN AUTO: 30.7 PG (ref 26.6–33)
MCHC RBC AUTO-ENTMCNC: 34.1 G/DL (ref 31.5–35.7)
MCV RBC AUTO: 90 FL (ref 79–97)
PLATELET # BLD AUTO: 220 10*3/MM3 (ref 140–450)
PMV BLD AUTO: 9.5 FL (ref 6–12)
POTASSIUM SERPL-SCNC: 3.6 MMOL/L (ref 3.5–5.2)
RBC # BLD AUTO: 5.99 10*6/MM3 (ref 3.77–5.28)
SODIUM SERPL-SCNC: 141 MMOL/L (ref 136–145)
WBC NRBC COR # BLD AUTO: 11.2 10*3/MM3 (ref 3.4–10.8)

## 2025-05-28 PROCEDURE — 36415 COLL VENOUS BLD VENIPUNCTURE: CPT

## 2025-05-28 PROCEDURE — 85027 COMPLETE CBC AUTOMATED: CPT

## 2025-05-28 PROCEDURE — 80048 BASIC METABOLIC PNL TOTAL CA: CPT

## 2025-05-28 PROCEDURE — 70486 CT MAXILLOFACIAL W/O DYE: CPT

## 2025-05-28 RX ORDER — OXYMETAZOLINE HYDROCHLORIDE 0.05 G/100ML
2 SPRAY NASAL ONCE
OUTPATIENT
Start: 2025-05-28

## 2025-05-28 RX ORDER — OXYMETAZOLINE HYDROCHLORIDE 0.05 G/100ML
2 SPRAY NASAL
OUTPATIENT
Start: 2025-05-28 | End: 2025-05-29

## 2025-05-28 NOTE — ASSESSMENT & PLAN NOTE
Orders:    Case Request; Standing    oxymetazoline (AFRIN) nasal spray 2 spray    oxymetazoline (AFRIN) nasal spray 2 spray    dexAMETHasone (DECADRON) 8 mg in sodium chloride 0.9 % IVPB

## 2025-05-28 NOTE — PROGRESS NOTES
Joseluis Navarrete MD  [facility]  [address], [address]  [place] [address]  Fax [phone]  Phone [phone]      Visit Type: FOLLOW UP   Chief Complaint   Patient presents with    Sinus Problem     Chronic sinusitis and deviated septum.   Pt says she can not breathe out of her left nostril.   CT prior to visit.            History of Present Illness  The patient returns for a follow-up visit concerning chronic sinus issues.    She reports experiencing nasal obstruction, which she believes may be contributing to other health complications. Despite utilizing prescribed intranasal corticosteroids, she has found no symptomatic relief. She notes that while her ability to exhale remains unimpaired, she struggles significantly with inhalation.    In 2020, the patient contracted COVID-19 and required hospitalization for a duration of three months. Upon discharge, she was prescribed supplemental oxygen therapy and various medications. Approximately six months ago, she elected to discontinue the use of supplemental oxygen. Since her discharge in 2020, she has not sought further medical evaluation, although she has continued to obtain medication refills from her primary care physician.    The patient has been self-monitoring her oxygen saturation levels, which tend to decrease during physical exertion, such as performing household chores like laundry or cleaning, but return to baseline after brief periods of rest. She also observes a drop in oxygen saturation to approximately 89% during outdoor activities, such as walking in the mall with her children. She suspects that her respiratory difficulties may be due to inadequate air intake. She describes an episode of abrupt awakening due to impaired breathing while sleeping in a prone position. Additionally, she reports that the use of her CPAP machine exacerbates her respiratory  difficulties.         History     Last Reviewed by Joseluis Navarrete MD on 5/28/2025 at  2:54 PM    Sections Reviewed    Tobacco, Family, Medical, Surgical    Problem list reviewed by Joseluis Navarrete MD on 5/28/2025 at  2:54 PM  Medicines reviewed by Joseluis Navarrete MD on 5/28/2025 at  2:54 PM  Allergies reviewed by Joseluis Navarrete MD on 5/28/2025 at  2:54 PM          Vital Signs:   Temp:  [97.9 °F (36.6 °C)] 97.9 °F (36.6 °C)  Heart Rate:  [78] 78  Resp:  [18] 18  BP: (119)/(74) 119/74  ENT Physical Exam   Physical Exam  Appearance: patient appears well-developed and well-nourished  Communication/Voice: communication appropriate for developmental age; vocal quality normal  Face: head appears normal, face appears normal and face appears atraumatic  Eyes: normal periorbita  Hearing: intact  Auricles: right auricle normal; left auricle normal  External Mastoids: right external mastoid normal; left external mastoid normal  External Nose: nares patent bilaterally; external nose normal  Nasal Cavity: The nose appears dry  Nasal Septum: deviated septum to left, severe  Inferior Turbinates: enlargement of the lower inferior turbinate on the right side  Lips: normal  Neck: normal  Respiratory: breathing unlabored; normal breathing rate  Cardiovascular: extremities are warm and well perfused; no peripheral edema present  Mental Status: alert and oriented  Psychiatric: mood normal; affect is appropriate  Skin: no skin lesions or rashes           Result Review       RESULTS REVIEW    Results  - Imaging:    - CAT scan of the nose:      - Deviated septum filling up the space on the left side of the nose      - Causing the right inferior turbinate to enlarge      - Sinuses appear normal             Assessment & Plan  Deviated nasal septum    Orders:    Case Request; Standing    oxymetazoline (AFRIN) nasal spray 2 spray    oxymetazoline (AFRIN) nasal spray 2 spray    dexAMETHasone (DECADRON) 8 mg in sodium  chloride 0.9 % IVPB    Hypertrophy of inferior nasal turbinate    Orders:    Case Request; Standing    oxymetazoline (AFRIN) nasal spray 2 spray    oxymetazoline (AFRIN) nasal spray 2 spray    dexAMETHasone (DECADRON) 8 mg in sodium chloride 0.9 % IVPB         Deviated septum  The patient's symptoms and CT scan results indicate a deviated septum causing significant nasal obstruction.  Surgical intervention recommended as the most effective treatment option.   Patient education on postoperative care, pain medication to be provided post-surgery, advised to avoid heavy lifting, straining, or yard work for 2 weeks post-surgery, instructed to withhold GLP-1 medication one week prior to surgery, and to abstain from food and drink after midnight on the day of surgery. Advised to refrain from using CPAP machine for a few weeks post-surgery.  Risks include anesthesia complications, nosebleeds, potential need for splints, septal perforation, and possible revision surgery.    Follow-up:  will contact patient to arrange surgery date.     Medical and surgical options were discussed including medical and surgical options. Risks, benefits and alternatives were discussed and questions were answered. After considering the options, the patient decided to proceed with surgery.     -----SURGERY SCHEDULING:-----  Schedule septoplasty with turbinate reduction (Bilateral)    ---INFORMED CONSENT DISCUSSION:---  SEPTOPLASTY AND TURBINOPLASTY: A septoplasty and inferior turbinoplasty were recommended. The risks and benefits were explained including but not limited to pain, bleeding, infection, risks of the general anesthesia, continued septal deviation, crusting, congestion and septal perforation. Possibilities of continued preoperative symptoms and the possible need for revision surgery and or medical therapy were discussed. Alternatives were discussed. No guarantees were made or implied. Questions were asked appropriately  answered.      ---PREOPERATIVE WORKUP:---  labs/ workup per anesthesia        Patient or patient representative verbalized consent for the use of Ambient Listening during the visit with  Joseluis Navarrete MD for chart documentation. 5/28/2025  14:54 CDT  Joseluis Navarrete MD

## 2025-05-28 NOTE — DISCHARGE INSTRUCTIONS
Preparing for Surgery  Follow these instructions before the procedure:  Several days or weeks before your procedure  Medication(s) you need to stop at least 1 week prior to surgery: Ozempic      Ask your health care provider about:  Changing or stopping your regular medicines. This is especially important if you are taking diabetes medicines or blood thinners.  Taking medicines such as aspirin and ibuprofen. These medicines can thin your blood. Do not take these medicines unless your health care provider tells you to take them.  Taking over-the-counter medicines, vitamins, herbs, and supplements.    Contact your surgeon if you:  Develop a fever of more than 100.4°F (38°C) or other feelings of illness during the 48 hours before your surgery.  Have symptoms that get worse.  Have questions or concerns about your surgery.  If you are going home the same day of your surgery you will need to arrange for a responsible adult, age 18 years old or older, to drive you home from the hospital and stay with you for 24 hours. Verification of the  will be made prior to any procedure requiring sedation. You may not go home in a taxi or any form of public transportation by yourself.     Day before your procedure    24 hours before your procedure DO NOT drink alcoholic beverages or smoke.  24 hours before your procedure STOP taking Erectile Dysfunction medication (i.e.,Cialis, Viagra)   You may be asked to shower with a germ-killing soap.  Day of your procedure   You may take the following medication(s) the morning of surgery with a sip of water: Metoprolol (TOPROL XL)      8 hours before your scheduled arrival time, STOP all food, any dairy products, and full liquids. This includes hard candy, chewing gum or mints. This is extremely important to prevent serious complications.     Up to 2 hours before your scheduled arrival time, you may have clear liquids no cream, powder, or pulp of any kind. Safe options are water, black  coffee, plain tea, soda, Gatorade/Powerade, clear broth, apple juice.    2 hours before your scheduled arrival time, STOP drinking clear liquids.    You may need to take another shower with a germ-killing soap before you leave home in the morning. Do not use perfumes, colognes, or body lotions.  Wear comfortable loose-fitting clothing.  Remove all jewelry including body piercing and rings, dark colored nail polish, and make up prior to arrival at the hospital. Leave all valuables at home.   Bring your hearing aids if you rely on them.  Do not wear contact lenses. If you wear eyeglasses remember to bring a case to store them in while you are in surgery.  Do not use denture adhesives since you will be asked to remove them during your surgery.    You do not need to bring your home medications into the hospital.   Bring your sleep apnea device with you on the day of your surgery (if this applies to you).  If you have an Inspire implant for sleep apnea, please bring the remote with you on the day of surgery.  If you wear portable oxygen, bring it with you.   If you are staying overnight, you may bring a bag of items you may need such as slippers, robe and a change of clothes for your discharge. You may want to leave these items in the car until you are ready for them since your family will take your belongings when you leave the pre-operative area.  Arrive at the hospital as scheduled by the office. You will be asked to arrive 2 hours prior to your surgery time in order to prepare for your procedure.  When you arrive at the hospital  Go to the registration desk located at the main entrance of the hospital.  After registration is completed, you will be given a beeper and a sticker sheet. Take the stickers to Outpatient Surgery and place in the tray at the end of the desk to notify the staff that you have arrived and registered.   Return to the lobby to wait. You are not always called back according to the time of arrival  but rather the time your doctor will be ready.  When your beeper lights up and vibrates proceed through the double doors, under the stairs, and a member of the Outpatient Surgery staff will escort you to your preoperative room.

## 2025-05-29 DIAGNOSIS — I10 PRIMARY HYPERTENSION: ICD-10-CM

## 2025-05-30 RX ORDER — CHLORTHALIDONE 25 MG/1
25 TABLET ORAL DAILY
Qty: 90 TABLET | Refills: 0 | Status: SHIPPED | OUTPATIENT
Start: 2025-05-30

## 2025-06-03 ENCOUNTER — TELEPHONE (OUTPATIENT)
Dept: UROLOGY | Facility: CLINIC | Age: 55
End: 2025-06-03
Payer: COMMERCIAL

## 2025-06-03 NOTE — TELEPHONE ENCOUNTER
Called patient to remind them to arrive at patient registration on 6/4/2025 at 5:00 am for the procedure with Dr. Wall. Spoke with patient.  Patient was informed that the surgery schedule fluctuates so there is no given start time. And to be prepared to be here all day. Told patient if they had any questions to please contact our office at [phone].

## 2025-06-04 ENCOUNTER — ANESTHESIA EVENT (OUTPATIENT)
Dept: PERIOP | Facility: HOSPITAL | Age: 55
End: 2025-06-04
Payer: COMMERCIAL

## 2025-06-04 ENCOUNTER — ANESTHESIA (OUTPATIENT)
Dept: PERIOP | Facility: HOSPITAL | Age: 55
End: 2025-06-04
Payer: COMMERCIAL

## 2025-06-04 ENCOUNTER — TELEPHONE (OUTPATIENT)
Dept: UROLOGY | Facility: CLINIC | Age: 55
End: 2025-06-04
Payer: COMMERCIAL

## 2025-06-04 NOTE — TELEPHONE ENCOUNTER
Patient called at 3:19 this morning and spoke with answering service. She said to cancel surgery and she will call back to reschedule.

## 2025-06-04 NOTE — TELEPHONE ENCOUNTER
Pt called to reschedule her procedure. Told her we would schedule it for 6/11/2025 and she would not need to redo the pre op. She stated understanding and had no additional questions at this time.

## 2025-06-04 NOTE — H&P
Urology H&P    Ms. Rubio is 55 y.o. female    CHIEF COMPLAINT: I leak urine    HPI  The patient is here today to address the stress component of her incontinence.  She reports no dysuria or other worrisome symptoms of infection    The following portions of the patient's history were reviewed and updated as appropriate: allergies, current medications, past family history, past medical history, past social history, past surgical history and problem list.    Review of Systems      No medications prior to admission.       No current facility-administered medications for this encounter.    Current Outpatient Medications:     albuterol (PROVENTIL) (2.5 MG/3ML) 0.083% nebulizer solution, Take 2.5 mg by nebulization Every 4 (Four) Hours As Needed for Wheezing., Disp: 90 mL, Rfl: 1    albuterol sulfate HFA (ProAir HFA) 108 (90 Base) MCG/ACT inhaler, Inhale 2 puffs Every 4 (Four) Hours As Needed for Wheezing., Disp: 8 g, Rfl: 11    atorvastatin (LIPITOR) 40 MG tablet, Take 1 tablet by mouth Every Night. (Patient not taking: Reported on 5/28/2025), Disp: , Rfl:     azelastine (ASTEPRO) 0.15 % solution nasal spray, 2 sprays into the nostril(s) as directed by provider 2 (Two) Times a Day. (Patient not taking: Reported on 5/28/2025), Disp: 30 mL, Rfl: 2    Blood Glucose Monitoring Suppl (OneTouch Verio Flex System) w/Device kit, Test blood glucose level 1-2 times per day DX:E11.9 (Patient not taking: Reported on 5/28/2025), Disp: , Rfl:     budesonide-formoterol (SYMBICORT) 80-4.5 MCG/ACT inhaler, Inhale 2 puffs Every 6 (Six) Hours. (Patient not taking: Reported on 5/28/2025), Disp: 10.2 g, Rfl: 5    celecoxib (CeleBREX) 100 MG capsule, Take 1 capsule by mouth 2 (Two) Times a Day As Needed for Mild Pain. (Patient not taking: Reported on 5/28/2025), Disp: 180 capsule, Rfl: 1    chlorthalidone (HYGROTON) 25 MG tablet, Take 1 tablet by mouth once daily, Disp: 90 tablet, Rfl: 0    clobetasol (TEMOVATE) 0.05 % external solution, Apply   topically to the appropriate area as directed 2 (Two) Times a Day., Disp: 50 mL, Rfl: 1    ergocalciferol (ERGOCALCIFEROL) 1.25 MG (60627 UT) capsule, Take 1 capsule by mouth 2 (Two) Times a Week., Disp: 12 capsule, Rfl: 5    Lancets (OneTouch Delica Plus [id number]) misc, USE 1 TO CHECK GLUCOSE DAILY, Disp: 100 each, Rfl: 0    methocarbamol (ROBAXIN) 750 MG tablet, Take 1 tablet by mouth 4 (Four) Times a Day As Needed for Muscle Spasms. (Patient not taking: Reported on 5/28/2025), Disp: 120 tablet, Rfl: 1    metoprolol succinate XL (TOPROL-XL) 50 MG 24 hr tablet, Take 1 tablet by mouth Daily., Disp: 90 tablet, Rfl: 1    nicotine (Nicoderm CQ) 21 MG/24HR patch, Place 1 patch on the skin as directed by provider Daily., Disp: 60 each, Rfl: 0    nystatin (MYCOSTATIN) 390592 UNIT/GM powder, Apply 1 gm to the affected skin area twice daily (Patient not taking: Reported on 5/28/2025), Disp: 60 g, Rfl: 0    ondansetron ODT (ZOFRAN-ODT) 4 MG disintegrating tablet, Place 1 tablet on the tongue Every 8 (Eight) Hours As Needed for Nausea or Vomiting., Disp: 20 tablet, Rfl: 1    OneTouch Verio test strip, USE AS DIRECTED DAILY  TO  CHECK  FASTING  BLOOD  SUGAR  AND  AS  NEEDED  FOR  DIABETES, Disp: 100 each, Rfl: 0    Ozempic, 0.25 or 0.5 MG/DOSE, 2 MG/3ML solution pen-injector, INJECT 0.5 MG SUBCUTANEOUSLY ONCE A WEEK, Disp: 3 mL, Rfl: 0    solifenacin (VESICARE) 10 MG tablet, Take 1 tablet by mouth Daily. (Patient not taking: Reported on 5/28/2025), Disp: 30 tablet, Rfl: 11    Spiriva Respimat 1.25 MCG/ACT aerosol solution inhaler, Inhale 2 puffs Daily., Disp: 4 g, Rfl: 2    Past Medical History:   Diagnosis Date    Arthritis     Colon polyp     COPD (chronic obstructive pulmonary disease)     Cough 09/10/2013    COVID-19     CPAP (continuous positive airway pressure) dependence     Diabetes mellitus     Elevated cholesterol     GERD (gastroesophageal reflux disease)     Hyperlipidemia     Hypertension     Obesity     Sleep  apnea     cpap with o2       Past Surgical History:   Procedure Laterality Date    AUGMENTATION MAMMAPLASTY      COLONOSCOPY N/A 05/30/2023    Procedure: COLONOSCOPY WITH ANESTHESIA;  Surgeon: Christopher Freeman MD;  Location: [facility] ENDOSCOPY;  Service: Gastroenterology;  Laterality: N/A;  preop; epigastric pain   postop polyp  PCP Nani denis    CYSTOSCOPY W/ BULKING AGENT INJECTION N/A 10/09/2023    Procedure: CYSTOSCOPY WITH BULKING AGENT INJECTION;  Surgeon: Octaviano Wall MD;  Location: [facility] OR;  Service: Urology;  Laterality: N/A;    ENDOSCOPY N/A 05/30/2023    Procedure: ESOPHAGOGASTRODUODENOSCOPY WITH ANESTHESIA;  Surgeon: Christopher Freeman MD;  Location: [facility] ENDOSCOPY;  Service: Gastroenterology;  Laterality: N/A;  preop; epigastric pain  postop normal  PCP Nani denis    LAPAROSCOPIC CHOLECYSTECTOMY      TUBAL ABDOMINAL LIGATION         Social History     Socioeconomic History    Marital status: Single   Tobacco Use    Smoking status: Every Day     Current packs/day: 1.00     Average packs/day: 1 pack/day for 35.0 years (35.0 ttl pk-yrs)     Types: Cigarettes     Passive exposure: Past    Smokeless tobacco: Never   Vaping Use    Vaping status: Never Used   Substance and Sexual Activity    Alcohol use: Never    Drug use: Never    Sexual activity: Defer     Partners: Male     Birth control/protection: None     Comment: I do not use any form of birth control       Family History   Problem Relation Age of Onset    Heart failure Mother     Diabetes Mother         2000    Lung cancer Mother     Heart disease Mother     Cancer Mother         Lung    COPD Mother     Hypertension Mother     Heart attack Father     Heart disease Father     Diabetes Father         2010    Colon cancer Father     Cancer Father         Colon    Hyperlipidemia Father     Stroke Father     Hypertension Father     Alcohol abuse Sister     Colon cancer Brother     Cancer Brother         Colon    Alcohol abuse Brother     Drug  "abuse Brother         1998    Breast cancer Paternal Aunt     Cancer Paternal Grandfather         Lung Cancer       LMP  (LMP Unknown) Comment: lmp January    Physical Exam      Lab Results   Component Value Date    GLUCOSE 161 (H) 05/28/2025    BUN 9.6 05/28/2025    CREATININE 0.58 05/28/2025    EGFRIFNONA >60 05/27/2022    EGFRIFAFRI >60 05/27/2022    BCR 16.6 05/28/2025    CO2 30.0 (H) 05/28/2025    CALCIUM 9.3 05/28/2025    ALBUMIN 4.4 02/17/2025    AST 34 (H) 02/17/2025    ALT 45 (H) 02/17/2025     Lab Results   Component Value Date    GLUCOSE 161 (H) 05/28/2025    CALCIUM 9.3 05/28/2025     05/28/2025    K 3.6 05/28/2025    CO2 30.0 (H) 05/28/2025     05/28/2025    BUN 9.6 05/28/2025    CREATININE 0.58 05/28/2025    EGFRIFAFRI >60 05/27/2022    EGFRIFNONA >60 05/27/2022    BCR 16.6 05/28/2025    ANIONGAP 11.0 05/28/2025     Lab Results   Component Value Date    WBC 11.20 (H) 05/28/2025    HGB 18.4 (H) 05/28/2025    HCT 53.9 (H) 05/28/2025    MCV 90.0 05/28/2025     05/28/2025     No results found for: \"PSA\"  No results found for: \"URINECX\"  Brief Urine Lab Results  (Last result in the past 365 days)        Color   Clarity   Blood   Leuk Est   Nitrite   Protein   CREAT   Urine HCG        02/17/25 1216             25.3                   Imaging Results (Last 7 Days)       ** No results found for the last 168 hours. **            Assessment and Plan  Incontinence, stress    Patient is here to undergo treatment for urinary stress incontinence.  We will do a Bulkamid injection.  Reviewed the rationale, alternative options, benefits and risks of this procedure were discussed at previous office visit.  Patient voiced no additional questions today.      (Please note that portions of this note were completed with a voice recognition program.)  Octaviano Wall MD  06/04/25  06:21 CDT             "

## 2025-06-09 ENCOUNTER — TELEPHONE (OUTPATIENT)
Dept: UROLOGY | Facility: CLINIC | Age: 55
End: 2025-06-09
Payer: COMMERCIAL

## 2025-06-09 NOTE — TELEPHONE ENCOUNTER
Called patient to remind them to arrive at patient registration on 6/11/2025 at 12:00 pm for the procedure with Dr. Wall. Spoke with patient.  Patient was informed that the surgery schedule fluctuates so there is no given start time. And to be prepared to be here all day. Told patient if they had any questions to please contact our office at [phone].

## 2025-06-11 ENCOUNTER — HOSPITAL ENCOUNTER (OUTPATIENT)
Facility: HOSPITAL | Age: 55
Setting detail: HOSPITAL OUTPATIENT SURGERY
Discharge: HOME | End: 2025-06-11
Attending: UROLOGY | Admitting: UROLOGY
Payer: COMMERCIAL

## 2025-06-11 VITALS
TEMPERATURE: 99 F | BODY MASS INDEX: 42.15 KG/M2 | RESPIRATION RATE: 18 BRPM | DIASTOLIC BLOOD PRESSURE: 81 MMHG | SYSTOLIC BLOOD PRESSURE: 135 MMHG | HEIGHT: 64 IN | OXYGEN SATURATION: 92 % | WEIGHT: 246.91 LBS | HEART RATE: 79 BPM

## 2025-06-11 DIAGNOSIS — N39.3 STRESS INCONTINENCE (FEMALE) (MALE): ICD-10-CM

## 2025-06-11 LAB — GLUCOSE BLDC GLUCOMTR-MCNC: 142 MG/DL (ref 70–130)

## 2025-06-11 PROCEDURE — 82948 REAGENT STRIP/BLOOD GLUCOSE: CPT

## 2025-06-11 PROCEDURE — G0463 HOSPITAL OUTPT CLINIC VISIT: HCPCS | Performed by: UROLOGY

## 2025-06-11 PROCEDURE — 25810000003 LACTATED RINGERS PER 1000 ML: Performed by: UROLOGY

## 2025-06-11 RX ORDER — LIDOCAINE HYDROCHLORIDE 10 MG/ML
0.5 INJECTION, SOLUTION EPIDURAL; INFILTRATION; INTRACAUDAL; PERINEURAL ONCE AS NEEDED
Status: DISCONTINUED | OUTPATIENT
Start: 2025-06-11 | End: 2025-06-16 | Stop reason: HOSPADM

## 2025-06-11 RX ORDER — SODIUM CHLORIDE, SODIUM LACTATE, POTASSIUM CHLORIDE, CALCIUM CHLORIDE 600; 310; 30; 20 MG/100ML; MG/100ML; MG/100ML; MG/100ML
1000 INJECTION, SOLUTION INTRAVENOUS CONTINUOUS
Status: DISCONTINUED | OUTPATIENT
Start: 2025-06-11 | End: 2025-06-12 | Stop reason: HOSPADM

## 2025-06-11 RX ORDER — SODIUM CHLORIDE 0.9 % (FLUSH) 0.9 %
3 SYRINGE (ML) INJECTION AS NEEDED
Status: DISCONTINUED | OUTPATIENT
Start: 2025-06-11 | End: 2025-06-16 | Stop reason: HOSPADM

## 2025-06-11 RX ADMIN — SODIUM CHLORIDE, POTASSIUM CHLORIDE, SODIUM LACTATE AND CALCIUM CHLORIDE 1000 ML: 600; 310; 30; 20 INJECTION, SOLUTION INTRAVENOUS at 12:45

## 2025-06-11 NOTE — ANESTHESIA PREPROCEDURE EVALUATION
Anesthesia Evaluation     Patient summary reviewed   no history of anesthetic complications:   NPO Solid Status: > 8 hours             Airway   Mallampati: II  TM distance: >3 FB  No difficulty expected  Dental      Pulmonary    (+) a smoker Current, COPD,home oxygen (2L since COVID 2020), sleep apnea on CPAP  Cardiovascular   Exercise tolerance: good (4-7 METS)    (+) hypertension, dysrhythmias Tachycardia, hyperlipidemia  (-) pacemaker, past MI, cardiac stents      Neuro/Psych  (-) seizures, TIA, CVA  GI/Hepatic/Renal/Endo    (+) morbid obesity, GERD, diabetes mellitus type 2, thyroid problem   (-) liver disease, no renal disease    Musculoskeletal     Abdominal   (+) obese   Substance History      OB/GYN          Other                          Anesthesia Plan    ASA 3     general     (Pt no longer has supplemental O2 and is very concerned about postop risk. I agree-->defer until after her f/u appointment and renewal of O2 ( presumably). I spoke with pacu about finding a tank, but it sounds like it won't occur.    She is OK to go home without sedation, as her holding room sat is 88% )  intravenous induction     Anesthetic plan, risks, benefits, and alternatives have been provided, discussed and informed consent has been obtained with: patient.      CODE STATUS:

## 2025-06-13 ENCOUNTER — OFFICE VISIT (OUTPATIENT)
Dept: FAMILY MEDICINE CLINIC | Facility: CLINIC | Age: 55
End: 2025-06-13
Payer: COMMERCIAL

## 2025-06-13 VITALS
DIASTOLIC BLOOD PRESSURE: 73 MMHG | BODY MASS INDEX: 42.34 KG/M2 | TEMPERATURE: 98.6 F | HEIGHT: 64 IN | WEIGHT: 248 LBS | OXYGEN SATURATION: 93 % | RESPIRATION RATE: 18 BRPM | HEART RATE: 81 BPM | SYSTOLIC BLOOD PRESSURE: 112 MMHG

## 2025-06-13 DIAGNOSIS — Z71.6 TOBACCO ABUSE COUNSELING: ICD-10-CM

## 2025-06-13 DIAGNOSIS — B37.2 YEAST DERMATITIS: ICD-10-CM

## 2025-06-13 DIAGNOSIS — J44.1 COPD WITH EXACERBATION: ICD-10-CM

## 2025-06-13 DIAGNOSIS — R40.0 DAYTIME SOMNOLENCE: ICD-10-CM

## 2025-06-13 DIAGNOSIS — G47.30 SLEEP APNEA, UNSPECIFIED TYPE: ICD-10-CM

## 2025-06-13 DIAGNOSIS — M25.511 ACUTE PAIN OF RIGHT SHOULDER: ICD-10-CM

## 2025-06-13 DIAGNOSIS — I10 PRIMARY HYPERTENSION: ICD-10-CM

## 2025-06-13 DIAGNOSIS — R11.0 NAUSEA: ICD-10-CM

## 2025-06-13 DIAGNOSIS — R06.83 SNORING: ICD-10-CM

## 2025-06-13 DIAGNOSIS — E11.65 UNCONTROLLED TYPE 2 DIABETES MELLITUS WITH HYPERGLYCEMIA: Primary | ICD-10-CM

## 2025-06-13 PROCEDURE — 1125F AMNT PAIN NOTED PAIN PRSNT: CPT | Performed by: NURSE PRACTITIONER

## 2025-06-13 PROCEDURE — 3078F DIAST BP <80 MM HG: CPT | Performed by: NURSE PRACTITIONER

## 2025-06-13 PROCEDURE — 99214 OFFICE O/P EST MOD 30 MIN: CPT | Performed by: NURSE PRACTITIONER

## 2025-06-13 PROCEDURE — 3044F HG A1C LEVEL LT 7.0%: CPT | Performed by: NURSE PRACTITIONER

## 2025-06-13 PROCEDURE — 1160F RVW MEDS BY RX/DR IN RCRD: CPT | Performed by: NURSE PRACTITIONER

## 2025-06-13 PROCEDURE — 1159F MED LIST DOCD IN RCRD: CPT | Performed by: NURSE PRACTITIONER

## 2025-06-13 PROCEDURE — 3074F SYST BP LT 130 MM HG: CPT | Performed by: NURSE PRACTITIONER

## 2025-06-13 RX ORDER — NYSTATIN 100000 [USP'U]/G
POWDER TOPICAL 3 TIMES DAILY
Qty: 60 G | Refills: 1 | Status: SHIPPED | OUTPATIENT
Start: 2025-06-13

## 2025-06-13 RX ORDER — CHLORTHALIDONE 25 MG/1
25 TABLET ORAL DAILY
Qty: 90 TABLET | Refills: 0 | Status: SHIPPED | OUTPATIENT
Start: 2025-06-13

## 2025-06-13 RX ORDER — METOPROLOL SUCCINATE 50 MG/1
50 TABLET, EXTENDED RELEASE ORAL DAILY
Qty: 90 TABLET | Refills: 1 | Status: SHIPPED | OUTPATIENT
Start: 2025-06-13

## 2025-06-13 RX ORDER — ONDANSETRON 4 MG/1
4 TABLET, ORALLY DISINTEGRATING ORAL EVERY 8 HOURS PRN
Qty: 20 TABLET | Refills: 1 | Status: SHIPPED | OUTPATIENT
Start: 2025-06-13

## 2025-06-13 RX ORDER — TIOTROPIUM BROMIDE INHALATION SPRAY 1.56 UG/1
2 SPRAY, METERED RESPIRATORY (INHALATION)
Qty: 4 G | Refills: 2 | Status: SHIPPED | OUTPATIENT
Start: 2025-06-13

## 2025-06-13 RX ORDER — ALBUTEROL SULFATE 90 UG/1
2 INHALANT RESPIRATORY (INHALATION) EVERY 4 HOURS PRN
Qty: 8 G | Refills: 11 | Status: SHIPPED | OUTPATIENT
Start: 2025-06-13

## 2025-06-13 NOTE — PROGRESS NOTES
Chief Complaint  Diabetes (Pt here for follow up)    Subjective        Anastasiia Rubio presents to [facility]  Hyperlipidemia      History of Present Illness  The patient presents for evaluation of diabetes, hyperlipidemia, nicotine dependence, COPD, depression, sleep apnea, right shoulder pain, and leg cramps.    She has been diagnosed with type 2 diabetes, with blood glucose levels consistently in the range of 140s to 150s. Despite the long-standing nature of her condition, she has not experienced any episodes of hypoglycemia severe enough to cause loss of consciousness or require hospitalization. She reports increased urination, excessive hunger, and excessive thirst. She has gained weight, increasing from 244 to 248 pounds since her last visit. She is currently on Ozempic but reports experiencing difficulties with this medication. Previous treatments with metformin and glipizide were unsuccessful. She monitors her fasting blood glucose levels and attempts to maintain a healthy diet, although without specific meal planning. She reports swelling in her legs, which she attributes to fluid retention. She is attempting to reduce her intake of diet Coke due to concerns about its potential impact on her liver health and its contribution to her leg swelling. She is not currently under the care of a podiatrist.    She has been diagnosed with hyperlipidemia for over a year, with her most recent lipid panel showing elevated levels. She is unable to tolerate statins due to the development of leg pain and aches while on atorvastatin.    She has a 40-year history of smoking, consuming approximately one pack per day. She has expressed interest in using lozenges as a smoking cessation aid.    She is currently using albuterol HFA as a rescue inhaler and Spiriva for long-term management of her COPD. She reports shortness of breath. She is requesting refills of her albuterol and Spiriva  inhalers.    She has been diagnosed with depression for over a year. She reports sleeping approximately 4 hours per night and does not return to sleep upon waking. She does not endorse any suicidal or homicidal ideation.    She has been diagnosed with sleep apnea and was previously using a CPAP machine, but it is currently broken. She has not had a sleep apnea test in several years. She reports feeling fatigued and run down. She also reports snoring and waking up due to jerking movements. She does not experience episodes of gasping for air during sleep. She reports feeling sluggish after eating.    She is requesting a refill of her metoprolol prescription.    She has been experiencing cramps in her legs and back, particularly in her upper leg, and is considering the use of a potassium supplement.    Approximately 3 weeks ago, she began experiencing pain in her right arm, which she initially attributed to sleeping in an awkward position. However, the pain has persisted and worsened over time. She reports difficulty lifting her arm behind her due to the pain. She describes the pain as originating from the bone and reports no history of injury to the area.    She has some hearing loss. She has hypothyroidism but is not on any medications. She has leukocytosis. She has vitamin D deficiency and needs to take ergocalciferol. She needs more Zofran for nausea.    SOCIAL HISTORY  The patient admits to smoking a pack a day for 40 years.    FAMILY HISTORY  The patient has a family history of CAD, risk, dyslipidemia, diabetes, hypertension, obesity, and postmenopausal.    The following portions of the patient's history were reviewed and updated as appropriate: allergies, current medications, past family history, past medical history, past social history, past surgical history and problem list.    Objective   Vital Signs:  /73 (BP Location: Left arm, Patient Position: Sitting, Cuff Size: Large Adult)   Pulse 81   Temp  "98.6 °F (37 °C) (Infrared)   Resp 18   Ht 163.6 cm (64.41\")   Wt 112 kg (248 lb)   SpO2 93%   BMI 42.03 kg/m²   Estimated body mass index is 42.03 kg/m² as calculated from the following:    Height as of this encounter: 163.6 cm (64.41\").    Weight as of this encounter: 112 kg (248 lb).       Class 3 Severe Obesity (BMI >=40). Obesity-related health conditions include the following: hypertension, diabetes mellitus, dyslipidemias, and GERD. Obesity is worsening. BMI is is above average; BMI management plan is completed. We discussed portion control and increasing exercise.        Physical Exam  Vitals and nursing note reviewed.   Constitutional:       General: She is awake.      Appearance: Normal appearance. She is well-developed and well-groomed.   HENT:      Head: Normocephalic and atraumatic.      Right Ear: Hearing, tympanic membrane, ear canal and external ear normal.      Left Ear: Hearing, tympanic membrane, ear canal and external ear normal.      Nose: Nose normal.      Mouth/Throat:      Lips: Pink.      Pharynx: Oropharynx is clear.   Eyes:      General: Lids are normal.      Conjunctiva/sclera: Conjunctivae normal.   Cardiovascular:      Rate and Rhythm: Normal rate and regular rhythm.      Heart sounds: Normal heart sounds.   Pulmonary:      Effort: Pulmonary effort is normal.      Breath sounds: Normal breath sounds and air entry.   Musculoskeletal:      Cervical back: Full passive range of motion without pain.      Right lower leg: No edema.      Left lower leg: No edema.   Lymphadenopathy:      Head:      Right side of head: No submental, submandibular or tonsillar adenopathy.      Left side of head: No submental, submandibular or tonsillar adenopathy.   Skin:     General: Skin is warm and dry.   Neurological:      Mental Status: She is alert.      Sensory: Sensation is intact.      Motor: Motor function is intact.      Coordination: Coordination is intact.      Gait: Gait is intact. "   Psychiatric:         Attention and Perception: Attention and perception normal.         Mood and Affect: Mood and affect normal.         Speech: Speech normal.         Behavior: Behavior normal. Behavior is cooperative.         Thought Content: Thought content normal.         Cognition and Memory: Cognition and memory normal.         Judgment: Judgment normal.        Physical Exam  Neck: Supple, no abnormalities  Musculoskeletal: Pain in right shoulder with movement, possible strain or degenerative arthritis    Result Review :          Results  Labs   - Blood glucose test: Blood sugar levels in the 140s, 150s   - Lipid Panel: High      Will call pt with results when xray results are in.   I looked this am and still no results will call radiology      Assessment and Plan     Diagnoses and all orders for this visit:    1. Uncontrolled type 2 diabetes mellitus with hyperglycemia (Primary)  -     Tirzepatide 2.5 MG/0.5ML solution auto-injector; Inject 2.5 mg under the skin into the appropriate area as directed 1 (One) Time Per Week.  Dispense: 2 mL; Refill: 0    2. Sleep apnea, unspecified type  -     Ambulatory Referral to Sleep Medicine    3. Snoring  -     Ambulatory Referral to Sleep Medicine    4. Daytime somnolence  -     Ambulatory Referral to Sleep Medicine    5. COPD with exacerbation  -     albuterol sulfate HFA (ProAir HFA) 108 (90 Base) MCG/ACT inhaler; Inhale 2 puffs Every 4 (Four) Hours As Needed for Wheezing.  Dispense: 8 g; Refill: 11  -     Spiriva Respimat 1.25 MCG/ACT aerosol solution inhaler; Inhale 2 puffs Daily.  Dispense: 4 g; Refill: 2    6. Primary hypertension  -     chlorthalidone (HYGROTON) 25 MG tablet; Take 1 tablet by mouth Daily.  Dispense: 90 tablet; Refill: 0  -     metoprolol succinate XL (TOPROL-XL) 50 MG 24 hr tablet; Take 1 tablet by mouth Daily.  Dispense: 90 tablet; Refill: 1    7. Nausea  -     ondansetron ODT (ZOFRAN-ODT) 4 MG disintegrating tablet; Place 1 tablet on the  tongue Every 8 (Eight) Hours As Needed for Nausea or Vomiting.  Dispense: 20 tablet; Refill: 1    8. Yeast dermatitis  -     nystatin (MYCOSTATIN) 557081 UNIT/GM powder; Apply  topically to the appropriate area as directed 3 (Three) Times a Day.  Dispense: 60 g; Refill: 1    9. Acute pain of right shoulder  -     XR Shoulder 2+ View Right (In Office)  -     diclofenac (VOLTAREN) 50 MG EC tablet; Take 1 tablet by mouth 2 (Two) Times a Day.  Dispense: 60 tablet; Refill: 0    10. Tobacco abuse counseling  -     nicotine polacrilex (COMMIT) 4 MG lozenge; Take 1 - 2 lozenge every 2 hrs  not to exceed 20/24 hr  Dispense: 72 each; Refill: 1    Other orders  -     Tobacco Cessation Education; Standing  -     Tobacco Cessation Education      Assessment & Plan  1. Type 2 Diabetes Mellitus.  Her blood glucose levels have been consistently in the range of 140s to 150s. She has experienced increased polyuria, polyphagia, and polydipsia. Her weight has increased from 244 to 248 pounds since the last visit. She has been experiencing complications such as leg edema associated with soda consumption and is making efforts to reduce her intake. Her current treatment regimen includes Ozempic, which she will be discontinuing due to adverse effects. She has previously tried metformin and glipizide without success. She monitors her fasting blood glucose levels and attempts to maintain a healthy diet, although without specific meal planning. Her weight trends have shown improvement. She has previously been on lisinopril but is not currently taking it. A lipid panel will be ordered to assess her current status. She will be started on tirzepatide (Mounjaro).    2. Hyperlipidemia.  She has been diagnosed with hyperlipidemia for over a year, with her most recent lipid panel showing elevated levels. She is unable to tolerate statins due to the development of leg pain and aches while on atorvastatin. A lipid panel will be ordered to assess her  current status.    3. Nicotine Dependence.  She has a 40-year history of smoking, consuming approximately one pack per day. A prescription for nicotine polacrilex 4 mg will be provided, with instructions to take one every 2 hours for 6 weeks, then one every 4 hours for 3 weeks, and finally one every 8 hours.    4. Chronic Obstructive Pulmonary Disease (COPD).  She is currently using albuterol HFA as a rescue inhaler and Spiriva for long-term management. A prescription for ProAir will be provided as albuterol sulfate inhaler is no longer on her preferred formulary.    5. Depression.  She has been diagnosed with depression for over a year. She reports sleeping approximately 4 hours per night and does not return to sleep upon waking. She does not endorse any suicidal or homicidal ideation.    6. Sleep Apnea.  Her [place] sleepiness scale score is 15, indicating uncontrolled sleep apnea. Her neck circumference is within normal limits at 14 inches. A referral to Dr. Bret Rahman will be made for further evaluation and management of her sleep apnea.    7. Right Shoulder Pain.  The pain appears to be muscular in nature. An x-ray of the shoulder will be ordered to rule out any underlying conditions such as degenerative arthritis or bone spurs. A prescription for diclofenac will be provided to manage inflammation.    8. Leg Cramps.  Her potassium levels are within normal limits. She is advised to try over-the-counter magnesium supplements for a few days to see if it alleviates her symptoms.    9. Medication Management.  A refill for metoprolol will be provided.      ICD-10-CM ICD-9-CM   1. Uncontrolled type 2 diabetes mellitus with hyperglycemia  E11.65 250.02   2. Sleep apnea, unspecified type  G47.30 780.57   3. Snoring  R06.83 786.09   4. Daytime somnolence  R40.0 780.54   5. COPD with exacerbation  J44.1 491.21   6. Primary hypertension  I10 401.9   7. Nausea  R11.0 787.02   8. Yeast dermatitis  B37.2 112.3   9. Acute pain  of right shoulder  M25.511 719.41   10. Tobacco abuse counseling  Z71.6 V65.42     305.1                Martin score is 15  Neck circumference  14   Hx Dx of sleep apnea use to have a CPAP machine but her broke, and so she has been without it.         Follow Up     Return in about 1 month (around 7/13/2025) for Recheck.  Patient was given instructions and counseling regarding her condition or for health maintenance advice. Please see specific information pulled into the AVS if appropriate.       Patient or patient representative verbalized consent for the use of Ambient Listening during the visit with  Nani Adkins DNP, AMY for chart documentation. 6/13/2025  17:21 CDT    Electronically signed by Nani Adkins DNP, APRN, 06/13/25, 5:21 PM CDT.

## 2025-06-16 ENCOUNTER — TELEPHONE (OUTPATIENT)
Dept: FAMILY MEDICINE CLINIC | Facility: CLINIC | Age: 55
End: 2025-06-16
Payer: COMMERCIAL

## 2025-06-16 NOTE — TELEPHONE ENCOUNTER
PA denied.     The member has had at least a 3-month trial and failure [drug did not work], allergy, contraindication [harmful for] (including potential drug-drug interactions with other medications) or intolerance [side effect] to two preferred GLP-1 agonists [drug in the same group]: Byetta, Ozempic, Trulicity, Victoza.

## 2025-06-18 ENCOUNTER — TELEPHONE (OUTPATIENT)
Dept: FAMILY MEDICINE CLINIC | Facility: CLINIC | Age: 55
End: 2025-06-18
Payer: COMMERCIAL

## 2025-06-18 DIAGNOSIS — E11.69 TYPE 2 DIABETES MELLITUS WITH OTHER SPECIFIED COMPLICATION, WITHOUT LONG-TERM CURRENT USE OF INSULIN: Primary | ICD-10-CM

## 2025-06-18 NOTE — TELEPHONE ENCOUNTER
Patient said she needs a PA for the tirzepatide. She doesn't have any medication right now for her diabetes. Her sugar level is 170-210. She can't use Ozempic because it doesn't help her anymore and causes stomach issues. She wants to know what she can take if the PA doesn't go through. Please send to [facility] in [place].

## 2025-07-07 DIAGNOSIS — M25.511 ACUTE PAIN OF RIGHT SHOULDER: ICD-10-CM

## 2025-07-09 NOTE — TELEPHONE ENCOUNTER
Rx Refill Note  Requested Prescriptions     Pending Prescriptions Disp Refills    diclofenac (VOLTAREN) 50 MG EC tablet [Pharmacy Med Name: Diclofenac Sodium 50 MG Oral Tablet Delayed Release] 60 tablet 0     Sig: Take 1 tablet by mouth twice daily      Last office visit with prescribing clinician: 6/13/2025   Last telemedicine visit with prescribing clinician: 3/4/2025   Next office visit with prescribing clinician: 10/1/2025     Amna Prince MA  07/09/25, 09:23 CDT

## 2025-07-30 ENCOUNTER — RESULTS FOLLOW-UP (OUTPATIENT)
Dept: FAMILY MEDICINE CLINIC | Facility: CLINIC | Age: 55
End: 2025-07-30

## 2025-07-30 ENCOUNTER — TELEPHONE (OUTPATIENT)
Dept: FAMILY MEDICINE CLINIC | Facility: CLINIC | Age: 55
End: 2025-07-30
Payer: COMMERCIAL

## 2025-07-30 ENCOUNTER — OFFICE VISIT (OUTPATIENT)
Dept: FAMILY MEDICINE CLINIC | Facility: CLINIC | Age: 55
End: 2025-07-30
Payer: COMMERCIAL

## 2025-07-30 VITALS
WEIGHT: 252 LBS | DIASTOLIC BLOOD PRESSURE: 72 MMHG | SYSTOLIC BLOOD PRESSURE: 110 MMHG | RESPIRATION RATE: 18 BRPM | BODY MASS INDEX: 43.02 KG/M2 | TEMPERATURE: 98.6 F | HEIGHT: 64 IN | OXYGEN SATURATION: 92 % | HEART RATE: 96 BPM

## 2025-07-30 DIAGNOSIS — R06.89 DYSPNEA AND RESPIRATORY ABNORMALITIES: Primary | ICD-10-CM

## 2025-07-30 DIAGNOSIS — I10 PRIMARY HYPERTENSION: ICD-10-CM

## 2025-07-30 DIAGNOSIS — E78.2 MIXED HYPERLIPIDEMIA: ICD-10-CM

## 2025-07-30 DIAGNOSIS — M25.552 CHRONIC HIP PAIN, BILATERAL: ICD-10-CM

## 2025-07-30 DIAGNOSIS — G89.29 CHRONIC BILATERAL LOW BACK PAIN WITH BILATERAL SCIATICA: ICD-10-CM

## 2025-07-30 DIAGNOSIS — G47.30 SLEEP APNEA, UNSPECIFIED TYPE: ICD-10-CM

## 2025-07-30 DIAGNOSIS — M54.42 CHRONIC BILATERAL LOW BACK PAIN WITH BILATERAL SCIATICA: ICD-10-CM

## 2025-07-30 DIAGNOSIS — Z99.89 CPAP (CONTINUOUS POSITIVE AIRWAY PRESSURE) DEPENDENCE: ICD-10-CM

## 2025-07-30 DIAGNOSIS — J44.9 CHRONIC OBSTRUCTIVE PULMONARY DISEASE, UNSPECIFIED COPD TYPE: ICD-10-CM

## 2025-07-30 DIAGNOSIS — M54.41 CHRONIC BILATERAL LOW BACK PAIN WITH BILATERAL SCIATICA: ICD-10-CM

## 2025-07-30 DIAGNOSIS — E11.69 TYPE 2 DIABETES MELLITUS WITH OTHER SPECIFIED COMPLICATION, WITHOUT LONG-TERM CURRENT USE OF INSULIN: ICD-10-CM

## 2025-07-30 DIAGNOSIS — J34.2 DEVIATED NASAL SEPTUM: ICD-10-CM

## 2025-07-30 DIAGNOSIS — G89.29 CHRONIC HIP PAIN, BILATERAL: ICD-10-CM

## 2025-07-30 DIAGNOSIS — M25.551 CHRONIC HIP PAIN, BILATERAL: ICD-10-CM

## 2025-07-30 DIAGNOSIS — R06.00 DYSPNEA AND RESPIRATORY ABNORMALITIES: Primary | ICD-10-CM

## 2025-07-30 DIAGNOSIS — Z51.81 THERAPEUTIC DRUG MONITORING: ICD-10-CM

## 2025-07-30 RX ORDER — PREGABALIN 75 MG/1
75 CAPSULE ORAL 2 TIMES DAILY
Qty: 60 CAPSULE | Refills: 1 | Status: SHIPPED | OUTPATIENT
Start: 2025-07-30

## 2025-07-30 RX ORDER — CELECOXIB 200 MG/1
200 CAPSULE ORAL DAILY
Qty: 30 CAPSULE | Refills: 0 | Status: SHIPPED | OUTPATIENT
Start: 2025-07-30 | End: 2025-07-30

## 2025-07-30 NOTE — PROGRESS NOTES
Chief Complaint  Back Pain    Subjective        Anastasiia Rubio presents to [facility]  Back Pain  Chronicity:  Chronic  Onset:  More than 1 year ago  Frequency:  Constantly  Progression since onset:  Worsening  Pain location:  Gluteal and sacro-iliac  Pain quality:  Aching, burning and stabbing  Radiates to:  Left thigh, left anterolateral lower leg, left buttock, right thigh, right anterolateral lower leg and right buttock  Pain-numeric:  10/10  Pain is:  The same all the time  Aggravated by:  Bending, position, standing and twisting  Associated symptoms: bladder incontinence, leg pain, numbness, paresthesias and tingling    Associated symptoms: no abdominal pain, no bowel incontinence, no chest pain, no dysuria, no fever, no pelvic pain, no perianal numbness, no weakness and no weight loss    Risk factors:  Menopause, obesity and sedentary lifestyle  Treatments tried:  Analgesics  Improvement on treatment:  No relief  Additional Information:  Most days i can hardly walk    History of Present Illness  The patient is a 55-year-old female who presents for evaluation of oxygenation levels. Really short of breath with any walking or exertion more than 20 foot.    She use to be on oxygen all the time however, got better but now worse and the hot temperatures and weather is really affecting her. Now when she exerts herself she gets short of breath, feels like she is going to pass out and has to sit down.  She can not live like this anymore.  If she can't get up and get around then there is nothing for her.  She is not suicidal/homicidal just tired of being sick.     Her oxygenation levels remain around 92 at rest but drop to approximately 85 or 86 during physical activity such as walking or exercising.  Oxygen 2 L  nasal cannula sat back to 92%. She has been in contact with [facility] in [place]. [facility]   [phone] Phone; [phone] Fax    Pt  "is going to switch to Dr. Barker, Pulmonologist in [place] ([facility]).  She seen him in the past and wants to return to him because she feels he at least listens and cares.      Pt is questioning the status of the sleep medicine referral made June 13, 2025  and it just says pending its been more than a month and noone has contacted her.     Pt is in so much pain she says she can not live her life like this anymore. Rates the pain 100.  Can't even stand to wash a sink full of dishes   Has tried and failed naprosyn, diclofenac, meloxicam and celebrex nothing works and she says she can not live like this anymore always in pain.  Not asking for pain medication just wants to get rid of the pain.         Objective   Vital Signs:  /72 (BP Location: Left arm, Patient Position: Sitting, Cuff Size: Large Adult)   Pulse 96   Temp 98.6 °F (37 °C) (Infrared)   Resp 18   Ht 163.6 cm (64.41\")   Wt 114 kg (252 lb)   SpO2 92%   BMI 42.71 kg/m²   Estimated body mass index is 42.71 kg/m² as calculated from the following:    Height as of this encounter: 163.6 cm (64.41\").    Weight as of this encounter: 114 kg (252 lb).       Class 3 Severe Obesity (BMI >=40). Obesity-related health conditions include the following: hypertension and copd, chronic pain,  tobacco dependance. Obesity is worsening. BMI is is above average; BMI management plan is completed. We discussed portion control and increasing exercise.        Physical Exam  Vitals and nursing note reviewed.   Constitutional:       General: She is awake.      Appearance: Normal appearance. She is well-developed and well-groomed.   HENT:      Head: Normocephalic and atraumatic.      Right Ear: Hearing, tympanic membrane, ear canal and external ear normal.      Left Ear: Hearing, tympanic membrane, ear canal and external ear normal.      Nose: Nose normal.      Mouth/Throat:      Lips: Pink.      Pharynx: Oropharynx is clear.   Eyes:      General: Lids are normal.      " Conjunctiva/sclera: Conjunctivae normal.   Cardiovascular:      Rate and Rhythm: Normal rate and regular rhythm.      Heart sounds: Normal heart sounds.   Pulmonary:      Effort: Pulmonary effort is normal.      Breath sounds: Decreased air movement present.      Comments: O2 sat sitting 92%, walking 86%   Musculoskeletal:      Cervical back: Normal and full passive range of motion without pain.      Thoracic back: Normal.      Lumbar back: Spasms and tenderness present. Decreased range of motion.        Back:       Right lower leg: No edema.      Left lower leg: No edema.   Lymphadenopathy:      Head:      Right side of head: No submental, submandibular or tonsillar adenopathy.      Left side of head: No submental, submandibular or tonsillar adenopathy.   Skin:     General: Skin is warm and dry.   Neurological:      Mental Status: She is alert and oriented to person, place, and time.      Sensory: Sensation is intact.      Motor: Motor function is intact.      Coordination: Coordination is intact.      Gait: Gait is intact.   Psychiatric:         Attention and Perception: Attention and perception normal.         Mood and Affect: Mood and affect normal.         Speech: Speech normal.         Behavior: Behavior normal. Behavior is cooperative.         Thought Content: Thought content normal.         Cognition and Memory: Cognition and memory normal.         Judgment: Judgment normal.        Physical Exam  Respiratory: Clear to auscultation, no wheezing, rales or rhonchi  Cardiovascular: Regular rate and rhythm, no murmurs, rubs, or gallops  Gastrointestinal: Soft, no tenderness, no distention, no masses    Result Review :          Results  XR Hips Bilateral With or Without Pelvis 2 View  Order: [id number]   Status: Final result       Dx: Chronic bilateral low back pain with ...    Test Result Released: No (scheduled for 7/30/2025  5:18 PM)    0 Result Notes  Details    Reading Physician Reading Date Result Priority    Kevon Murphy MD  [phone]  7/30/2025      Narrative & Impression  EXAMINATION: XR HIPS BILATERAL W OR WO PELVIS 2 VIEW-     HISTORY: chronic back and hip pain; M54.42-Lumbago with sciatica, left  side; M54.41-Lumbago with sciatica, right side; G89.29-Other chronic  pain; M25.551-Pain in right hip; M25.552-Pain in left hip; G89.29-Other  chronic pain     Images are stored in PACS per institutional protocol.     Pelvis and bilateral hips, 5 views.     The pelvic ring is intact.  No pubic symphysis or sacroiliac joint diastasis.     The acetabulum and proximal femur and are intact.  Mild acetabular spurring.  Mild narrowing of the bilateral hip joint spaces..     Soft tissues are appropriate.     Summary:  1. Mild degenerative changes at both hip joints.  2. No acute bony abnormality is seen.                 This report was signed and finalized on 7/30/2025 4:14 PM by Dr. Navdeep Murphy MD.           Exam Ended: 07/30/25 15:27 CDT Last Resulted: 07/30/25 16:14 CDT                   Assessment and Plan   Diagnoses and all orders for this visit:    1. Dyspnea and respiratory abnormalities (Primary)  -     Oxygen Therapy    2. Chronic obstructive pulmonary disease, unspecified COPD type        -   managed by pulmonologist in [place]    3. Type 2 diabetes mellitus with other specified complication, without long-term current use of insulin  -     Hemoglobin A1c    4. Chronic bilateral low back pain with bilateral sciatica  -     XR Spine Lumbar 2 or 3 View (In Office)  -     XR Hips Bilateral With or Without Pelvis 2 View  -     pregabalin (Lyrica) 75 MG capsule; Take 1 capsule by mouth 2 (Two) Times a Day.  Dispense: 60 capsule; Refill: 1  -     Discontinue: celecoxib (CeleBREX) 200 MG capsule; Take 1 capsule by mouth Daily.  Dispense: 30 capsule; Refill: 0  -     tiZANidine (ZANAFLEX) 4 MG tablet; Take 1 tablet by mouth Every 8 (Eight) Hours As Needed for Muscle Spasms.  Dispense: 90 tablet; Refill: 0  -      Ambulatory Referral to Physical Therapy for Evaluation & Treatment    5. Chronic hip pain, bilateral  -     XR Spine Lumbar 2 or 3 View (In Office)  -     XR Hips Bilateral With or Without Pelvis 2 View  -     pregabalin (Lyrica) 75 MG capsule; Take 1 capsule by mouth 2 (Two) Times a Day.  Dispense: 60 capsule; Refill: 1  -     Discontinue: celecoxib (CeleBREX) 200 MG capsule; Take 1 capsule by mouth Daily.  Dispense: 30 capsule; Refill: 0  -     tiZANidine (ZANAFLEX) 4 MG tablet; Take 1 tablet by mouth Every 8 (Eight) Hours As Needed for Muscle Spasms.  Dispense: 90 tablet; Refill: 0  -     Ambulatory Referral to Physical Therapy for Evaluation & Treatment    6. Therapeutic drug monitoring  -     ToxASSURE Select 13 Discrete -    7. Mixed hyperlipidemia  -     Lipid panel    8. Primary hypertension  -     CBC (No Diff)  -     Comprehensive metabolic panel    9. Sleep apnea, unspecified type    10. Deviated nasal septum    11. CPAP (continuous positive airway pressure) dependence          Diagnoses and all orders for this visit:    1. Dyspnea and respiratory abnormalities (Primary)  -     Oxygen Therapy    2. Chronic bilateral low back pain with bilateral sciatica  -     XR Spine Lumbar 2 or 3 View (In Office)  -     XR Hips Bilateral With or Without Pelvis 2 View  -     pregabalin (Lyrica) 75 MG capsule; Take 1 capsule by mouth 2 (Two) Times a Day.  Dispense: 60 capsule; Refill: 1  -     celecoxib (CeleBREX) 200 MG capsule; Take 1 capsule by mouth Daily.  Dispense: 30 capsule; Refill: 0  -     tiZANidine (ZANAFLEX) 4 MG tablet; Take 1 tablet by mouth Every 8 (Eight) Hours As Needed for Muscle Spasms.  Dispense: 90 tablet; Refill: 0    3. Chronic hip pain, bilateral  -     XR Spine Lumbar 2 or 3 View (In Office)  -     XR Hips Bilateral With or Without Pelvis 2 View  -     pregabalin (Lyrica) 75 MG capsule; Take 1 capsule by mouth 2 (Two) Times a Day.  Dispense: 60 capsule; Refill: 1  -     celecoxib (CeleBREX) 200  MG capsule; Take 1 capsule by mouth Daily.  Dispense: 30 capsule; Refill: 0  -     tiZANidine (ZANAFLEX) 4 MG tablet; Take 1 tablet by mouth Every 8 (Eight) Hours As Needed for Muscle Spasms.  Dispense: 90 tablet; Refill: 0    Physical Therapy referral placed for Emily lei  Assessment & Plan  1. Back pain.  - Reports severe back pain radiating to hips, buttocks, and legs, interfering with daily activities.  - Physical therapy and various medications tried without relief; pain described as burning, aching sensation with numbness in legs.  - Diclofenac discontinued; Celebrex 200 mg initiated; muscle relaxer and Lyrica prescribed.  - Referral to a back specialist considered after obtaining x-rays and MRI if no improvement.    2. Shortness of breath.  - Experiences significant shortness of breath with exertion; oxygen levels drop to 85-86%.  - Oxygen ordered through [facility] in [place].  - Appointment with pulmonologist scheduled for 09/22/2025.      ICD-10-CM ICD-9-CM   1. Dyspnea and respiratory abnormalities  R06.00 786.09    R06.89    2. Chronic obstructive pulmonary disease, unspecified COPD type  J44.9 496   3. Type 2 diabetes mellitus with other specified complication, without long-term current use of insulin  E11.69 250.80   4. Chronic bilateral low back pain with bilateral sciatica  M54.42 724.2    M54.41 724.3    G89.29 338.29   5. Chronic hip pain, bilateral  M25.551 719.45    M25.552 338.29    G89.29    6. Therapeutic drug monitoring  Z51.81 V58.83   7. Mixed hyperlipidemia  E78.2 272.2   8. Primary hypertension  I10 401.9   9. Sleep apnea, unspecified type  G47.30 780.57   10. Deviated nasal septum  J34.2 470   11. CPAP (continuous positive airway pressure) dependence  Z99.89 V46.8          I spent 45 minutes caring for Anastasiia on this date of service. This time includes time spent by me in the following activities:preparing for the visit, reviewing tests, obtaining and/or reviewing a  separately obtained history, performing a medically appropriate examination and/or evaluation , counseling and educating the patient/family/caregiver, ordering medications, tests, or procedures, referring and communicating with other health care professionals , documenting information in the medical record, and care coordination      Follow Up     Return in about 1 month (around 8/30/2025) for Recheck.  Patient was given instructions and counseling regarding her condition or for health maintenance advice. Please see specific information pulled into the AVS if appropriate.         Electronically signed by Nani Adkins DNP, APRN, 07/30/25, 3:10 PM CDT.    Patient or patient representative verbalized consent for the use of Ambient Listening during the visit with  Nani Adkins DNP, APRN for chart documentation. 7/30/2025  15:03 CDT    Electronically signed by Nani Adkins DNP, APRN, 07/30/25, 3:11 PM CDT.

## 2025-07-31 ENCOUNTER — TELEPHONE (OUTPATIENT)
Dept: FAMILY MEDICINE CLINIC | Facility: CLINIC | Age: 55
End: 2025-07-31
Payer: COMMERCIAL

## 2025-07-31 LAB
ALBUMIN SERPL-MCNC: 4.3 G/DL (ref 3.5–5.2)
ALBUMIN/GLOB SERPL: 1.8 G/DL
ALP SERPL-CCNC: 137 U/L (ref 39–117)
ALT SERPL-CCNC: 47 U/L (ref 1–33)
AST SERPL-CCNC: 34 U/L (ref 1–32)
BILIRUB SERPL-MCNC: 0.4 MG/DL (ref 0–1.2)
BUN SERPL-MCNC: 12 MG/DL (ref 6–20)
BUN/CREAT SERPL: 18.2 (ref 7–25)
CALCIUM SERPL-MCNC: 9.3 MG/DL (ref 8.6–10.5)
CHLORIDE SERPL-SCNC: 100 MMOL/L (ref 98–107)
CHOLEST SERPL-MCNC: 215 MG/DL (ref 0–200)
CO2 SERPL-SCNC: 28 MMOL/L (ref 22–29)
CREAT SERPL-MCNC: 0.66 MG/DL (ref 0.57–1)
EGFRCR SERPLBLD CKD-EPI 2021: 105 ML/MIN/1.73
ERYTHROCYTE [DISTWIDTH] IN BLOOD BY AUTOMATED COUNT: 12.7 % (ref 12.3–15.4)
GLOBULIN SER CALC-MCNC: 2.4 GM/DL
GLUCOSE SERPL-MCNC: 131 MG/DL (ref 65–99)
HBA1C MFR BLD: 6.2 % (ref 4.8–5.6)
HCT VFR BLD AUTO: 53.3 % (ref 34–46.6)
HDLC SERPL-MCNC: 37 MG/DL (ref 40–60)
HGB BLD-MCNC: 18.4 G/DL (ref 12–15.9)
LDLC SERPL CALC-MCNC: 134 MG/DL (ref 0–100)
MCH RBC QN AUTO: 31.6 PG (ref 26.6–33)
MCHC RBC AUTO-ENTMCNC: 34.5 G/DL (ref 31.5–35.7)
MCV RBC AUTO: 91.4 FL (ref 79–97)
PLATELET # BLD AUTO: 229 10*3/MM3 (ref 140–450)
POTASSIUM SERPL-SCNC: 3.6 MMOL/L (ref 3.5–5.2)
PROT SERPL-MCNC: 6.7 G/DL (ref 6–8.5)
RBC # BLD AUTO: 5.83 10*6/MM3 (ref 3.77–5.28)
SODIUM SERPL-SCNC: 140 MMOL/L (ref 136–145)
TRIGL SERPL-MCNC: 246 MG/DL (ref 0–150)
VLDLC SERPL CALC-MCNC: 44 MG/DL (ref 5–40)
WBC # BLD AUTO: 12.52 10*3/MM3 (ref 3.4–10.8)

## 2025-08-04 LAB
6MAM UR QL CFM: NEGATIVE
6MAM/CREAT UR: NOT DETECTED NG/MG CREAT
7AMINOCLONAZEPAM/CREAT UR: NOT DETECTED NG/MG CREAT
A-OH ALPRAZ/CREAT UR: NOT DETECTED NG/MG CREAT
A-OH-TRIAZOLAM/CREAT UR CFM: NOT DETECTED NG/MG CREAT
ALFENTANIL/CREAT UR CFM: NOT DETECTED NG/MG CREAT
ALPHA-HYDROXYMIDAZOLAM, URINE: NOT DETECTED NG/MG CREAT
ALPRAZ/CREAT UR CFM: NOT DETECTED NG/MG CREAT
AMOBARBITAL UR QL CFM: NOT DETECTED
AMPHET/CREAT UR: NOT DETECTED NG/MG CREAT
AMPHETAMINES UR QL CFM: NEGATIVE
BARBITAL UR QL CFM: NOT DETECTED
BARBITURATES UR QL CFM: NEGATIVE
BENZODIAZ UR QL CFM: NEGATIVE
BUPRENORPHINE UR QL CFM: NEGATIVE
BUPRENORPHINE/CREAT UR: NOT DETECTED NG/MG CREAT
BUTABARBITAL UR QL CFM: NOT DETECTED
BUTALBITAL UR QL CFM: NOT DETECTED
BZE/CREAT UR: NOT DETECTED NG/MG CREAT
CANNABINOIDS UR QL CFM: NEGATIVE
CARBOXYTHC/CREAT UR: NOT DETECTED NG/MG CREAT
CLONAZEPAM/CREAT UR CFM: NOT DETECTED NG/MG CREAT
COCAETHYLENE/CREAT UR CFM: NOT DETECTED NG/MG CREAT
COCAINE UR QL CFM: NEGATIVE
COCAINE/CREAT UR CFM: NOT DETECTED NG/MG CREAT
CODEINE/CREAT UR: NOT DETECTED NG/MG CREAT
CREAT UR-MCNC: 176 MG/DL
DESALKYLFLURAZ/CREAT UR: NOT DETECTED NG/MG CREAT
DESMETHYLFLUNITRAZEPAM: NOT DETECTED NG/MG CREAT
DHC/CREAT UR: NOT DETECTED NG/MG CREAT
DIAZEPAM/CREAT UR: NOT DETECTED NG/MG CREAT
DRUGS UR: NORMAL
EDDP/CREAT UR: NOT DETECTED NG/MG CREAT
ETHANOL UR CFM-MCNC: NOT DETECTED G/DL
ETHANOL UR QL CFM: NEGATIVE
FENTANYL UR QL CFM: NEGATIVE
FENTANYL/CREAT UR: NOT DETECTED NG/MG CREAT
FLUNITRAZEPAM UR QL CFM: NOT DETECTED NG/MG CREAT
HYDROCODONE/CREAT UR: NOT DETECTED NG/MG CREAT
HYDROMORPHONE/CREAT UR: NOT DETECTED NG/MG CREAT
LORAZEPAM/CREAT UR: NOT DETECTED NG/MG CREAT
MDA/CREAT UR: NOT DETECTED NG/MG CREAT
MDMA/CREAT UR: NOT DETECTED NG/MG CREAT
MEPHOBARBITAL UR QL CFM: NOT DETECTED
METHADONE UR QL CFM: NEGATIVE
METHADONE/CREAT UR: NOT DETECTED NG/MG CREAT
METHAMPHET/CREAT UR: NOT DETECTED NG/MG CREAT
MIDAZOLAM/CREAT UR CFM: NOT DETECTED NG/MG CREAT
MORPHINE/CREAT UR: NOT DETECTED NG/MG CREAT
N-NORTRAMADOL/CREAT UR CFM: NOT DETECTED NG/MG CREAT
NARCOTICS UR: NEGATIVE
NORBUPRENORPHINE/CREAT UR: NOT DETECTED NG/MG CREAT
NORCODEINE/CREAT UR CFM: NOT DETECTED NG/MG CREAT
NORDIAZEPAM/CREAT UR: NOT DETECTED NG/MG CREAT
NORFENTANYL/CREAT UR: NOT DETECTED NG/MG CREAT
NORHYDROCODONE/CREAT UR: NOT DETECTED NG/MG CREAT
NORMORPHINE UR-MCNC: NOT DETECTED NG/MG CREAT
NOROXYCODONE/CREAT UR: NOT DETECTED NG/MG CREAT
NOROXYMORPHONE/CREAT UR CFM: NOT DETECTED NG/MG CREAT
O-NORTRAMADOL UR CFM-MCNC: NOT DETECTED NG/MG CREAT
OPIATES UR QL CFM: NEGATIVE
OXAZEPAM/CREAT UR: NOT DETECTED NG/MG CREAT
OXYCODONE UR QL CFM: NEGATIVE
OXYCODONE/CREAT UR: NOT DETECTED NG/MG CREAT
OXYMORPHONE/CREAT UR: NOT DETECTED NG/MG CREAT
PENTOBARB UR QL CFM: NOT DETECTED
PHENOBARB UR QL CFM: NOT DETECTED
SECOBARBITAL UR QL CFM: NOT DETECTED
SERVICE CMNT 02-IMP: NORMAL
SUFENTANIL/CREAT UR CFM: NOT DETECTED NG/MG CREAT
TAPENTADOL UR QL CFM: NEGATIVE
TAPENTADOL/CREAT UR: NOT DETECTED NG/MG CREAT
TEMAZEPAM/CREAT UR: NOT DETECTED NG/MG CREAT
THIOPENTAL UR QL CFM: NOT DETECTED
TRAMADOL UR QL CFM: NOT DETECTED NG/MG CREAT

## 2025-08-11 DIAGNOSIS — Z88.8 ALLERGY TO STATIN MEDICATION: Primary | ICD-10-CM

## 2025-08-11 DIAGNOSIS — E78.2 MIXED HYPERLIPIDEMIA: ICD-10-CM

## 2025-08-19 ENCOUNTER — TELEPHONE (OUTPATIENT)
Dept: FAMILY MEDICINE CLINIC | Facility: CLINIC | Age: 55
End: 2025-08-19
Payer: COMMERCIAL

## 2025-08-19 DIAGNOSIS — E78.2 MIXED HYPERLIPIDEMIA: Primary | ICD-10-CM

## 2025-08-19 RX ORDER — CHLORAL HYDRATE 500 MG
1000 CAPSULE ORAL
Qty: 30 CAPSULE | Refills: 2 | Status: SHIPPED | OUTPATIENT
Start: 2025-08-19 | End: 2025-11-17

## 2025-08-21 RX ORDER — DULAGLUTIDE 0.75 MG/.5ML
INJECTION, SOLUTION SUBCUTANEOUS
Qty: 4 ML | Refills: 0 | Status: SHIPPED | OUTPATIENT
Start: 2025-08-21

## (undated) DEVICE — Device: Brand: DEFENDO AIR/WATER/SUCTION AND BIOPSY VALVE

## (undated) DEVICE — PK CYSTO 30

## (undated) DEVICE — YANKAUER,BULB TIP WITH VENT: Brand: ARGYLE

## (undated) DEVICE — THE CHANNEL CLEANING BRUSH IS A NYLON FLEXI BRUSH ATTACHED TO A FLEXIBLE PLASTIC SHEATH DESIGNED TO SAFELY REMOVE DEBRIS FROM FLEXIBLE ENDOSCOPES.

## (undated) DEVICE — BULKAMID URETHRAL BULKING SYSTEM 2ML
Type: IMPLANTABLE DEVICE | Status: NON-FUNCTIONAL
Brand: BULKAMID

## (undated) DEVICE — THE SINGLE USE ETRAP – POLYP TRAP IS USED FOR SUCTION RETRIEVAL OF ENDOSCOPICALLY REMOVED POLYPS.: Brand: ETRAP

## (undated) DEVICE — CUFF,BP,DISP,1 TUBE,ADULT,HP: Brand: MEDLINE

## (undated) DEVICE — SNAR POLYP SENSATION JUMBO OVL 30 240X5

## (undated) DEVICE — FRCP BIOP ENDO CAPTURA/PRO SERR SPK 2.8MM 230CM

## (undated) DEVICE — GOWN,NON-REINFORCED,SIRUS,SET IN SLV,XXL: Brand: MEDLINE

## (undated) DEVICE — MASK,OXYGEN,MED CONC,ADLT,7' TUB, UC: Brand: PENDING

## (undated) DEVICE — BHS TURNOVER CYSTO: Brand: MEDLINE INDUSTRIES, INC.

## (undated) DEVICE — GLV SURG BIOGEL M LTX PF 8

## (undated) DEVICE — SENSR O2 OXIMAX FNGR A/ 18IN NONSTR

## (undated) DEVICE — CONMED SCOPE SAVER BITE BLOCK, 20X27 MM: Brand: SCOPE SAVER